# Patient Record
Sex: MALE | Race: WHITE | NOT HISPANIC OR LATINO | URBAN - METROPOLITAN AREA
[De-identification: names, ages, dates, MRNs, and addresses within clinical notes are randomized per-mention and may not be internally consistent; named-entity substitution may affect disease eponyms.]

---

## 2017-04-25 ENCOUNTER — APPOINTMENT (RX ONLY)
Dept: URBAN - METROPOLITAN AREA CLINIC 24 | Facility: CLINIC | Age: 67
Setting detail: DERMATOLOGY
End: 2017-04-25

## 2017-04-25 DIAGNOSIS — L81.4 OTHER MELANIN HYPERPIGMENTATION: ICD-10-CM

## 2017-04-25 DIAGNOSIS — L57.0 ACTINIC KERATOSIS: ICD-10-CM

## 2017-04-25 DIAGNOSIS — Z85.828 PERSONAL HISTORY OF OTHER MALIGNANT NEOPLASM OF SKIN: ICD-10-CM

## 2017-04-25 DIAGNOSIS — L85.3 XEROSIS CUTIS: ICD-10-CM

## 2017-04-25 DIAGNOSIS — D18.0 HEMANGIOMA: ICD-10-CM

## 2017-04-25 DIAGNOSIS — D22 MELANOCYTIC NEVI: ICD-10-CM

## 2017-04-25 DIAGNOSIS — L82.1 OTHER SEBORRHEIC KERATOSIS: ICD-10-CM

## 2017-04-25 PROBLEM — I10 ESSENTIAL (PRIMARY) HYPERTENSION: Status: ACTIVE | Noted: 2017-04-25

## 2017-04-25 PROBLEM — D23.61 OTHER BENIGN NEOPLASM OF SKIN OF RIGHT UPPER LIMB, INCLUDING SHOULDER: Status: ACTIVE | Noted: 2017-04-25

## 2017-04-25 PROBLEM — D22.5 MELANOCYTIC NEVI OF TRUNK: Status: ACTIVE | Noted: 2017-04-25

## 2017-04-25 PROBLEM — D18.01 HEMANGIOMA OF SKIN AND SUBCUTANEOUS TISSUE: Status: ACTIVE | Noted: 2017-04-25

## 2017-04-25 PROBLEM — L55.1 SUNBURN OF SECOND DEGREE: Status: ACTIVE | Noted: 2017-04-25

## 2017-04-25 PROCEDURE — 17003 DESTRUCT PREMALG LES 2-14: CPT

## 2017-04-25 PROCEDURE — 17000 DESTRUCT PREMALG LESION: CPT

## 2017-04-25 PROCEDURE — ? LIQUID NITROGEN

## 2017-04-25 PROCEDURE — ? OTHER

## 2017-04-25 PROCEDURE — ? COUNSELING

## 2017-04-25 PROCEDURE — 99214 OFFICE O/P EST MOD 30 MIN: CPT | Mod: 25

## 2017-04-25 ASSESSMENT — LOCATION DETAILED DESCRIPTION DERM
LOCATION DETAILED: RIGHT PROXIMAL DORSAL FOREARM
LOCATION DETAILED: LEFT POSTERIOR SHOULDER
LOCATION DETAILED: LEFT SUPERIOR LATERAL MALAR CHEEK
LOCATION DETAILED: LEFT SUPERIOR POSTERIOR HELIX
LOCATION DETAILED: LEFT SUPERIOR MEDIAL FOREHEAD
LOCATION DETAILED: LEFT RIB CAGE
LOCATION DETAILED: RIGHT MID TEMPLE
LOCATION DETAILED: LEFT DISTAL DORSAL FOREARM
LOCATION DETAILED: LEFT PROXIMAL DORSAL FOREARM
LOCATION DETAILED: RIGHT POSTERIOR SHOULDER
LOCATION DETAILED: LEFT MID-UPPER BACK
LOCATION DETAILED: LEFT SUPERIOR LATERAL MIDBACK
LOCATION DETAILED: RIGHT DISTAL DORSAL FOREARM

## 2017-04-25 ASSESSMENT — LOCATION ZONE DERM
LOCATION ZONE: ARM
LOCATION ZONE: FACE
LOCATION ZONE: EAR
LOCATION ZONE: TRUNK

## 2017-04-25 ASSESSMENT — LOCATION SIMPLE DESCRIPTION DERM
LOCATION SIMPLE: ABDOMEN
LOCATION SIMPLE: LEFT FOREHEAD
LOCATION SIMPLE: LEFT EAR
LOCATION SIMPLE: RIGHT SHOULDER
LOCATION SIMPLE: LEFT UPPER BACK
LOCATION SIMPLE: RIGHT TEMPLE
LOCATION SIMPLE: RIGHT FOREARM
LOCATION SIMPLE: LEFT SHOULDER
LOCATION SIMPLE: LEFT CHEEK
LOCATION SIMPLE: LEFT LOWER BACK
LOCATION SIMPLE: LEFT FOREARM

## 2017-04-25 NOTE — PROCEDURE: LIQUID NITROGEN
Duration Of Freeze Thaw-Cycle (Seconds): 4
Consent: The patient's consent was obtained including but not limited to risks of crusting, scabbing, blistering, scarring, darker or lighter pigmentary change, recurrence, incomplete removal and infection.
Number Of Freeze-Thaw Cycles: 1 freeze-thaw cycle
Post-Care Instructions: I reviewed with the patient in detail post-care instructions. Patient is to wear sunprotection, and avoid picking at any of the treated lesions. Pt may apply Vaseline to crusted or scabbing areas.
Detail Level: Simple
Render Post-Care Instructions In Note?: no

## 2017-04-25 NOTE — PROCEDURE: OTHER
Note Text (......Xxx Chief Complaint.): This diagnosis correlates with the
Other (Free Text): Encouraged pt to moisturize
Detail Level: Simple

## 2018-11-14 ENCOUNTER — APPOINTMENT (RX ONLY)
Dept: URBAN - METROPOLITAN AREA CLINIC 24 | Facility: CLINIC | Age: 68
Setting detail: DERMATOLOGY
End: 2018-11-14

## 2018-11-14 DIAGNOSIS — Z85.828 PERSONAL HISTORY OF OTHER MALIGNANT NEOPLASM OF SKIN: ICD-10-CM

## 2018-11-14 DIAGNOSIS — L57.0 ACTINIC KERATOSIS: ICD-10-CM

## 2018-11-14 DIAGNOSIS — Z71.89 OTHER SPECIFIED COUNSELING: ICD-10-CM

## 2018-11-14 DIAGNOSIS — L21.8 OTHER SEBORRHEIC DERMATITIS: ICD-10-CM | Status: WELL CONTROLLED

## 2018-11-14 DIAGNOSIS — L82.1 OTHER SEBORRHEIC KERATOSIS: ICD-10-CM

## 2018-11-14 DIAGNOSIS — L85.3 XEROSIS CUTIS: ICD-10-CM

## 2018-11-14 DIAGNOSIS — D18.0 HEMANGIOMA: ICD-10-CM

## 2018-11-14 DIAGNOSIS — L81.4 OTHER MELANIN HYPERPIGMENTATION: ICD-10-CM

## 2018-11-14 DIAGNOSIS — D22 MELANOCYTIC NEVI: ICD-10-CM

## 2018-11-14 PROBLEM — I10 ESSENTIAL (PRIMARY) HYPERTENSION: Status: ACTIVE | Noted: 2018-11-14

## 2018-11-14 PROBLEM — D18.01 HEMANGIOMA OF SKIN AND SUBCUTANEOUS TISSUE: Status: ACTIVE | Noted: 2018-11-14

## 2018-11-14 PROBLEM — L55.1 SUNBURN OF SECOND DEGREE: Status: ACTIVE | Noted: 2018-11-14

## 2018-11-14 PROBLEM — D22.5 MELANOCYTIC NEVI OF TRUNK: Status: ACTIVE | Noted: 2018-11-14

## 2018-11-14 PROCEDURE — 99214 OFFICE O/P EST MOD 30 MIN: CPT | Mod: 25

## 2018-11-14 PROCEDURE — 17003 DESTRUCT PREMALG LES 2-14: CPT

## 2018-11-14 PROCEDURE — ? OTHER

## 2018-11-14 PROCEDURE — ? LIQUID NITROGEN

## 2018-11-14 PROCEDURE — ? COUNSELING

## 2018-11-14 PROCEDURE — 17000 DESTRUCT PREMALG LESION: CPT

## 2018-11-14 ASSESSMENT — LOCATION DETAILED DESCRIPTION DERM
LOCATION DETAILED: RIGHT PROXIMAL POSTERIOR UPPER ARM
LOCATION DETAILED: LEFT SUPERIOR POSTERIOR HELIX
LOCATION DETAILED: RIGHT ANTERIOR PROXIMAL THIGH
LOCATION DETAILED: LEFT INFERIOR FOREHEAD
LOCATION DETAILED: RIGHT LATERAL ABDOMEN
LOCATION DETAILED: RIGHT SUPERIOR MEDIAL FOREHEAD
LOCATION DETAILED: LEFT POSTERIOR SHOULDER
LOCATION DETAILED: LEFT FOREHEAD
LOCATION DETAILED: LEFT SUPERIOR FOREHEAD
LOCATION DETAILED: LEFT SUPERIOR LATERAL MIDBACK
LOCATION DETAILED: RIGHT INFERIOR LATERAL MIDBACK
LOCATION DETAILED: LEFT SUPERIOR LATERAL MALAR CHEEK
LOCATION DETAILED: LEFT RIB CAGE
LOCATION DETAILED: RIGHT PROXIMAL CALF
LOCATION DETAILED: NASAL SUPRATIP
LOCATION DETAILED: RIGHT POSTERIOR SHOULDER
LOCATION DETAILED: LEFT MID-UPPER BACK

## 2018-11-14 ASSESSMENT — LOCATION ZONE DERM
LOCATION ZONE: ARM
LOCATION ZONE: FACE
LOCATION ZONE: NOSE
LOCATION ZONE: EAR
LOCATION ZONE: LEG
LOCATION ZONE: TRUNK

## 2018-11-14 ASSESSMENT — LOCATION SIMPLE DESCRIPTION DERM
LOCATION SIMPLE: ABDOMEN
LOCATION SIMPLE: RIGHT FOREHEAD
LOCATION SIMPLE: LEFT FOREHEAD
LOCATION SIMPLE: NOSE
LOCATION SIMPLE: LEFT CHEEK
LOCATION SIMPLE: LEFT SHOULDER
LOCATION SIMPLE: LEFT UPPER BACK
LOCATION SIMPLE: RIGHT SHOULDER
LOCATION SIMPLE: LEFT EAR
LOCATION SIMPLE: RIGHT UPPER ARM
LOCATION SIMPLE: RIGHT LOWER BACK
LOCATION SIMPLE: LEFT LOWER BACK
LOCATION SIMPLE: RIGHT CALF
LOCATION SIMPLE: RIGHT THIGH

## 2018-11-14 NOTE — PROCEDURE: OTHER
Note Text (......Xxx Chief Complaint.): This diagnosis correlates with the
Other (Free Text): Short cool showers, moisturizer
Detail Level: Simple

## 2018-11-14 NOTE — PROCEDURE: LIQUID NITROGEN
Post-Care Instructions: I reviewed with the patient in detail post-care instructions. Patient is to wear sunprotection, and avoid picking at any of the treated lesions. Pt may apply Vaseline to crusted or scabbing areas.
Number Of Freeze-Thaw Cycles: 1 freeze-thaw cycle
Duration Of Freeze Thaw-Cycle (Seconds): 5
Detail Level: Simple
Render Post-Care Instructions In Note?: no
Consent: The patient's consent was obtained including but not limited to risks of crusting, scabbing, blistering, scarring, darker or lighter pigmentary change, recurrence, incomplete removal and infection.

## 2022-02-09 ENCOUNTER — HOSPITAL ENCOUNTER (OUTPATIENT)
Dept: WOUND CARE | Age: 72
Discharge: HOME | End: 2022-02-09
Attending: PODIATRIST
Payer: COMMERCIAL

## 2022-02-09 VITALS — DIASTOLIC BLOOD PRESSURE: 117 MMHG | HEART RATE: 94 BPM | SYSTOLIC BLOOD PRESSURE: 199 MMHG | TEMPERATURE: 97.9 F

## 2022-02-09 PROBLEM — L89.893 PRESSURE INJURY OF LEFT FOOT, STAGE 3 (HCC): Status: ACTIVE | Noted: 2022-02-09

## 2022-02-09 PROBLEM — M76.822 POSTERIOR TIBIAL TENDON DYSFUNCTION (PTTD) OF LEFT LOWER EXTREMITY: Status: ACTIVE | Noted: 2022-02-09

## 2022-02-09 PROCEDURE — 97597 DBRDMT OPN WND 1ST 20 CM/<: CPT

## 2022-02-09 RX ORDER — LIDOCAINE HYDROCHLORIDE 40 MG/ML
SOLUTION TOPICAL ONCE
Status: CANCELLED | OUTPATIENT
Start: 2022-02-09 | End: 2022-02-09

## 2022-02-09 RX ORDER — BETAMETHASONE DIPROPIONATE 0.5 MG/G
OINTMENT TOPICAL ONCE
Status: CANCELLED | OUTPATIENT
Start: 2022-02-09 | End: 2022-02-09

## 2022-02-09 RX ORDER — LIDOCAINE 40 MG/G
CREAM TOPICAL ONCE
Status: CANCELLED | OUTPATIENT
Start: 2022-02-09 | End: 2022-02-09

## 2022-02-09 RX ORDER — CLOBETASOL PROPIONATE 0.5 MG/G
OINTMENT TOPICAL ONCE
Status: CANCELLED | OUTPATIENT
Start: 2022-02-09 | End: 2022-02-09

## 2022-02-09 RX ORDER — SILVER SULFADIAZINE 10 G/1000G
CREAM TOPICAL ONCE
Status: CANCELLED | OUTPATIENT
Start: 2022-02-09 | End: 2022-02-09

## 2022-02-09 RX ORDER — LIDOCAINE HYDROCHLORIDE 20 MG/ML
JELLY TOPICAL ONCE
Status: CANCELLED | OUTPATIENT
Start: 2022-02-09 | End: 2022-02-09

## 2022-02-09 RX ORDER — GENTAMICIN SULFATE 1 MG/G
OINTMENT TOPICAL ONCE
Status: CANCELLED | OUTPATIENT
Start: 2022-02-09 | End: 2022-02-09

## 2022-02-09 RX ORDER — BACITRACIN ZINC AND POLYMYXIN B SULFATE 500; 1000 [USP'U]/G; [USP'U]/G
OINTMENT TOPICAL ONCE
Status: CANCELLED | OUTPATIENT
Start: 2022-02-09 | End: 2022-02-09

## 2022-02-09 RX ORDER — TRIAMCINOLONE ACETONIDE 1 MG/G
OINTMENT TOPICAL ONCE
Status: CANCELLED | OUTPATIENT
Start: 2022-02-09 | End: 2022-02-09

## 2022-02-09 RX ORDER — BACITRACIN 500 [USP'U]/G
OINTMENT TOPICAL ONCE
Status: CANCELLED | OUTPATIENT
Start: 2022-02-09 | End: 2022-02-09

## 2022-02-09 RX ORDER — MUPIROCIN 20 MG/G
OINTMENT TOPICAL ONCE
Status: CANCELLED | OUTPATIENT
Start: 2022-02-09 | End: 2022-02-09

## 2022-02-09 RX ORDER — LIDOCAINE 50 MG/G
OINTMENT TOPICAL ONCE
Status: CANCELLED | OUTPATIENT
Start: 2022-02-09 | End: 2022-02-09

## 2022-02-09 NOTE — WOUND CARE
02/09/22 1051   Wound Foot Left;Plantar   Date First Assessed/Time First Assessed: 02/09/22 1051   Present on Hospital Admission: Yes  Wound Approximate Age at First Assessment (Weeks): 7 weeks  Location: Foot  Wound Location Orientation: Left;Plantar   Wound Image    Wound Etiology Other (Comment)  (neuropathic)   Dressing Status Intact   Cleansed Cleansed with saline   Dressing/Treatment Band-Aid/Adhesive bandage   Wound Length (cm) 0.5 cm   Wound Width (cm) 0.4 cm   Wound Depth (cm) 0.5 cm   Wound Surface Area (cm^2) 0.2 cm^2   Wound Volume (cm^3) 0.1 cm^3   Wound Assessment Pink/red   Drainage Amount Small   Drainage Description Serosanguinous   Wound Odor None   Luna-Wound/Incision Assessment Hyperkeratosis (Callous)   Wound Thickness Description Full thickness

## 2022-02-09 NOTE — PROGRESS NOTES
[address] [place] [address] Drive [address], [address]      [address] RECORD NUMBER:  [id number]  AGE: 80 y.o. RACE WHITE/NON-  GENDER: male  : 1950  EPISODE DATE:  2022    Subjective:     Chief Complaint   Patient presents with    Wound Check     left plantar foot 22        Marck Arana is a 80 y.o. WHITE/NON- male who presents with a wound to the left lower extremity at the plantar 3rd metatarsal head. This has been present for 2-3 months. He notes a long history of flat foot and has special orthopedic shoes with added roll bars and memory foam insoles. He recently increased his walking which created a callus that subsequently broke down. He was seen by his PCP on 2/3/22 who prescribed oral keflex which he continues on today. He has a trip to [facility] planned for next week but is considering not going due to the foot. It is unclear what he is placing on the wound itself for wound care. He denies drainage. He denies pain.        HISTORY of PRESENT ILLNESS HPI    Nature: Painless  Location: left forefoot  Duration: 2021  Onset: Patient states it started as callusing  Course: stable  Aggravating/Alleviating: Worsened with pressure   Treatment: none    Ulcer Identification:  Ulcer Type: pressure    Contributing Factors: chronic pressure    Wound: stage 3 pressure         PAST MEDICAL HISTORY    Past Medical History:   Diagnosis Date    Basal cell carcinoma 2014    Erectile dysfunction     Hypertension     Posterior tibial tendon dysfunction (PTTD) of left lower extremity 2022    Pressure injury of left foot, stage 3 ([address] 2022        PAST SURGICAL HISTORY    Past Surgical History:   Procedure Laterality Date    HX HEENT      tonsillectomy    HX MALIGNANT SKIN LESION EXCISION  2014    left ear lesion; Basal cell carcinoma       FAMILY HISTORY    Family History   Problem Relation Age of Onset    Cancer Mother 62        breast cancer    Cancer Father 80        colon cancer       SOCIAL HISTORY    Social History     Tobacco Use    Smoking status: Never Smoker    Smokeless tobacco: Never Used   Substance Use Topics    Alcohol use: No    Drug use: Never       ALLERGIES    No Known Allergies    MEDICATIONS    Current Outpatient Medications on File Prior to Encounter   Medication Sig Dispense Refill    latanoprost (XALATAN) 0.005 % ophthalmic solution INSTILL 1 DROP INTO BOTH EYES EVERY NIGHT      amLODIPine-benazepril (LOTREL) 5-20 mg per capsule Take 1 Capsule by mouth daily. 90 Capsule 3    cephALEXin (KEFLEX) 500 mg capsule Take 1 Capsule by mouth three (3) times daily. 30 Capsule 0    sildenafil citrate (VIAGRA) 100 mg tablet Take 1 Tab by mouth as needed. 5 Tab 5     No current facility-administered medications on file prior to encounter. REVIEW OF SYSTEMS    Pertinent items are noted in HPI. Objective:     Visit Vitals  BP (!) 199/117 (BP 1 Location: Left upper arm, BP Patient Position: At rest)   Pulse 94   Temp 97.9 °F (36.6 °C)       Wt Readings from Last 3 Encounters:   02/03/22 91.2 kg (201 lb)   11/05/20 92.4 kg (203 lb 9.6 oz)   09/10/20 94.4 kg (208 lb 3.2 oz)       PHYSICAL EXAM    General: well developed, well nourished, pleasant , NAD. Hygiene good  Psych: cooperative. Pleasant. No anxiety or depression. Normal mood and affect. HEENT: Normocephalic, atraumatic. EOMI. Conjunctiva clear. No scleral icterus. Neck: Normal range of motion. No masses. Chest: Good air entry bilaterally. Respirations nonlabored  Cardio: Normal heart sounds,no rubs, murmurs or gallops  Abdomen: Soft, nontender, nondistended, normoactive bowel sounds    Vascular: DP and PT pulses are palpable at 2/4 bilateral. Skin temperature is uniform from proximal to distal bilateral. Hair growth is absent bilateral. No erythema, edema, heat is appreciated bilateral. No evidence of cellulitis.    Derm: Nails 1-5 bilateral are thickened, discolored and with subungual debris. No paronychial infections are noted. Skin is atrophic and xerotic. No subcutaneous masses or hyperpigmented lesions are present. Neuro: Epicritic sensation is present bilateral. Protective sensation is pesent with 5.07 SWMF testing to all 10 sites bilateral. Muscle tone and bulk is symmetric bilateral.      Full thickness ulceration is noted to the sub 3rd metatarsal head left foot. Thick hyperkeratotic rim with granular base. No erythema, edema, purulence or odor. Soft end feel with no bone exposed or palpable. No undermining or sinus track is noted. No fluctuance with palpation. Left foot with semi reducible posterior tibial tendon dysfunction with loss of medial arch, valgus heel and abducted forefoot. DATA REVIEW:  No results found for this or any previous visit (from the past 336 hour(s)).       Debridement Wound Care      Wound Foot Left;Plantar (Active)   Wound Image    02/09/22 1051   Wound Etiology Other (Comment) 02/09/22 1051   Dressing Status Intact 02/09/22 1051   Cleansed Cleansed with saline 02/09/22 1051   Dressing/Treatment Band-Aid/Adhesive bandage 02/09/22 1051   Wound Length (cm) 0.5 cm 02/09/22 1051   Wound Width (cm) 0.4 cm 02/09/22 1051   Wound Depth (cm) 0.5 cm 02/09/22 1051   Wound Surface Area (cm^2) 0.2 cm^2 02/09/22 1051   Wound Volume (cm^3) 0.1 cm^3 02/09/22 1051   Post-Procedure Length (cm) 1 cm 02/09/22 1051   Post-Procedure Width (cm) 1 cm 02/09/22 1051   Post-Procedure Depth (cm) 0.2 cm 02/09/22 1051   Post-Procedure Surface Area (cm^2) 1 cm^2 02/09/22 1051   Post-Procedure Volume (cm^3) 0.2 cm^3 02/09/22 1051   Wound Assessment Pink/red 02/09/22 1051   Drainage Amount Small 02/09/22 1051   Drainage Description Serosanguinous 02/09/22 1051   Wound Odor None 02/09/22 1051   Luna-Wound/Incision Assessment Hyperkeratosis (Callous) 02/09/22 1051   Wound Thickness Description Full thickness 02/09/22 1051   Number of days: 0        Procedure Note  Indications:  Based on my examination of this patient's wound(s)/ulcer(s) today, debridement is required to promote healing and evaluate the wound base. Performed by: Greyson Haile DPM    Consent obtained: Yes    Time out taken: Yes    Debridement: Non-excisional/Selective    Using forceps and # 15 blade scalpel the wound(s)/ulcer(s) was/were sharply debrided down through and including the removal of    subcutaneous tissue    Devitalized Tissue Debrided: slough and callus    Pre Debridement Measurements:  Are located in the Wound/Ulcer Documentation Flow Sheet    Wound/Ulcer #: left foot    Post Debridement Measurements:  Wound/Ulcer Descriptions are Pre Debridement except measurements:Pressure ulcer, Stage 3    Percent of Wound(s)/Ulcer(s) Debrided: 100%    Total Surface Area Debrided:  1 sq cm     Estimated Blood Loss:  None    Hemostasis Achieved: Pressure    Procedural Pain: 0 / 10     Post Procedural Pain: 0 / 10     Response to treatment: Well tolerated by patient     Assessment and Plan:     Problem List Items Addressed This Visit     Pressure ulcer stage 3, left foot  PTTD left foot        Patient examined and evaluated. Educated patient and barriers to healing. Pressure reduction is paramount due to location of the wound. Patient was against the idea of any offloading devices. Explained at length that if the pressure is not removed from the wound area, the wound will not heal. He was given the options of a darco wedge shoe or a flat shoe with a peg insole. By the end of the conversation he was more amenable to this idea. Infection control is required to prevent loss of limb/life - maintain dressing coverage at all times, do not soak or get wet in the shower, wash hands before and after dressing changes. Monitor for erythema, edema, odor, and thick yellow drainage and contact the center immediately if noted.     Proper nutrition to support skin growth includes increased protein, vitamins and amino acids - animal based foods and Francisco recommended. Moisture management to prevent maceration and dryness - keep wound covered at all times outside dressing care, do not \"air out\" or soak. Dressing will consist of acticoat three times a week. He was advised that if he goes to [facility] he is not to get sand or public water (ocean, pool, hot tub etc...) in the wound. Recommend he rents a golf cart where he is going to get around versus all those stpes to the foot. He will return here in 3 weeks due to his trip. Patient instructed on the following: Follow all wound dressing instructions. Do not get dressing or wound wet. May shower if wound can be effectively kept dry. Cast covers may be purchased at [facility] [employer] [facility]. Should you experience increased redness, swelling, pain, foul odor, size of wound(s), or have a temperature over 101 degrees please contact the [address] [place] [address] Road at [phone] or if after hours contact your primary care physician or go to the hospital emergency department. Orders Placed This Encounter    WOUND CARE, DRESSING CHANGE     Left plantar foot:  Cleanse with normal saline   Acticoat Flex 3: cut top approximate size of wound, apply to wound bed. Cover with bandaid or coversite  Change dressing 3 times per week      Limit walking as much as possible  Obtain PEG insole for offloading bring with you at next appointment  DO NOT GET WET in shower  May purchase cast cover from [employer] or [employer] for showering     Standing Status:   Standing     Number of Occurrences:   1       Follow-up Information     Follow up With Specialties Details Why Contact Info    Alberto [place] In 2 weeks For wound re-check William Hutchinson [address] [facility] [id number] [phone]           Treatment Note please see attached Discharge Instructions    Written patient dismissal instructions given to patient or POA. Electronically signed by King Moises DPM on 2/9/2022 at 11:36 AM

## 2022-02-09 NOTE — WOUND CARE
02/09/22 1051   Wound Foot Left;Plantar   Date First Assessed/Time First Assessed: 02/09/22 1051   Present on Hospital Admission: Yes  Wound Approximate Age at First Assessment (Weeks): 7 weeks  Location: Foot  Wound Location Orientation: Left;Plantar   Wound Image     Wound Etiology Other (Comment)  (neuropathic)   Dressing Status Intact   Cleansed Cleansed with saline   Dressing/Treatment Band-Aid/Adhesive bandage   Wound Length (cm) 0.5 cm   Wound Width (cm) 0.4 cm   Wound Depth (cm) 0.5 cm   Wound Surface Area (cm^2) 0.2 cm^2   Wound Volume (cm^3) 0.1 cm^3   Post-Procedure Length (cm) 1 cm   Post-Procedure Width (cm) 1 cm   Post-Procedure Depth (cm) 0.2 cm   Post-Procedure Surface Area (cm^2) 1 cm^2   Post-Procedure Volume (cm^3) 0.2 cm^3   Wound Assessment Pink/red   Drainage Amount Small   Drainage Description Serosanguinous   Wound Odor None   Luna-Wound/Incision Assessment Hyperkeratosis (Callous)   Wound Thickness Description Full thickness     Post debridement

## 2022-02-09 NOTE — DISCHARGE INSTRUCTIONS
[address]  [address], [address]  Phone: [phone]  Fax: [phone]    Patient: Reche Blizzard MRN: [id number]  SSN: [id number]    YOB: 1950  Age: 80 y.o. Sex: male       Return Appointment: 2 weeks with Elvia Marr DPM    Instructions: Left plantar foot:  Cleanse with normal saline   Acticoat Flex 3: cut top approximate size of wound, apply to wound bed. Cover with bandaid or coversite  Change dressing 3 times per week      Limit walking as much as possible  Obtain PEG insole for offloading bring with you at next appointment  DO NOT GET WET in shower  May purchase cast cover from [employer] or [employer] for showering    Should you experience increased redness, swelling, pain, foul odor, size of wound(s), or have a temperature over 101 degrees please contact the [address] [place] [address] Road at [phone] or if after hours contact your primary care physician or go to the hospital emergency department.     Signed By: Dennise Perez RN     February 9, 2022

## 2022-02-09 NOTE — WOUND CARE
[address]  [address], [address]  Phone: [phone]  Fax: [phone]    Patient: Trenton Perez MRN: [id number]  SSN: [id number]    YOB: 1950  Age: 80 y.o. Sex: male       Return Appointment: 2 weeks with Maggy Deleon DPM    Instructions: Left plantar foot:  Cleanse with normal saline   Acticoat Flex 3: cut top approximate size of wound, apply to wound bed. Cover with bandaid or coversite  Change dressing 3 times per week      Limit walking as much as possible  Obtain PEG insole for offloading bring with you at next appointment  DO NOT GET WET in shower  May purchase cast cover from [employer] or [employer] for showering    Should you experience increased redness, swelling, pain, foul odor, size of wound(s), or have a temperature over 101 degrees please contact the [address] [place] [address] Road at [phone] or if after hours contact your primary care physician or go to the hospital emergency department.     Signed By: Serena Lloyd RN     February 9, 2022

## 2022-02-18 ENCOUNTER — NURSE TRIAGE (OUTPATIENT)
Dept: OTHER | Facility: CLINIC | Age: 72
End: 2022-02-18

## 2022-02-18 NOTE — TELEPHONE ENCOUNTER
Received call from Albin Rios at [facility] with Departing. Subjective: Caller states \"Unbalance\"     Current Symptoms: Feels unbalance in the morning, as the days goes it gets better x 1 week. Neck and back pain. Onset: 1 week ago; intermittent    Associated Symptoms: NA    Pain Severity: 3/10; aching; intermittent    Temperature: denies fever    What has been tried: Advil and heating pad    LMP: NA Pregnant: NA    Recommended disposition: Office today. [facility] as backup plan    Care advice provided, patient verbalizes understanding; denies any other questions or concerns; instructed to call back for any new or worsening symptoms. Patient/Caller agrees with recommended disposition; writer provided warm transfer to [facility] at [facility] for appointment scheduling    Attention Provider: Thank you for allowing me to participate in the care of your patient. The patient was connected to triage in response to information provided to the [facility]. Please do not respond through this encounter as the response is not directed to a shared pool.     Reason for Disposition   MODERATE dizziness (e.g., interferes with normal activities) (Exception: dizziness caused by heat exposure, sudden standing, or poor fluid intake)    Protocols used: DIZZINESS-ADULT-OH

## 2022-02-21 PROBLEM — E11.9 TYPE 2 DIABETES MELLITUS (HCC): Status: ACTIVE | Noted: 2022-02-21

## 2022-02-23 ENCOUNTER — HOSPITAL ENCOUNTER (OUTPATIENT)
Dept: WOUND CARE | Age: 72
Discharge: HOME | End: 2022-02-23
Attending: PODIATRIST
Payer: COMMERCIAL

## 2022-02-23 VITALS
OXYGEN SATURATION: 99 % | HEIGHT: 68 IN | WEIGHT: 198 LBS | BODY MASS INDEX: 30.01 KG/M2 | TEMPERATURE: 97.6 F | SYSTOLIC BLOOD PRESSURE: 191 MMHG | DIASTOLIC BLOOD PRESSURE: 99 MMHG | HEART RATE: 85 BPM | RESPIRATION RATE: 16 BRPM

## 2022-02-23 DIAGNOSIS — L89.893 PRESSURE INJURY OF LEFT FOOT, STAGE 3 (HCC): Primary | ICD-10-CM

## 2022-02-23 DIAGNOSIS — M76.822 POSTERIOR TIBIAL TENDON DYSFUNCTION (PTTD) OF LEFT LOWER EXTREMITY: ICD-10-CM

## 2022-02-23 PROCEDURE — 97597 DBRDMT OPN WND 1ST 20 CM/<: CPT

## 2022-02-23 RX ORDER — CLOBETASOL PROPIONATE 0.5 MG/G
OINTMENT TOPICAL ONCE
Status: CANCELLED | OUTPATIENT
Start: 2022-02-23 | End: 2022-02-23

## 2022-02-23 RX ORDER — LIDOCAINE HYDROCHLORIDE 40 MG/ML
SOLUTION TOPICAL ONCE
Status: CANCELLED | OUTPATIENT
Start: 2022-02-23 | End: 2022-02-23

## 2022-02-23 RX ORDER — TRIAMCINOLONE ACETONIDE 1 MG/G
OINTMENT TOPICAL ONCE
Status: CANCELLED | OUTPATIENT
Start: 2022-02-23 | End: 2022-02-23

## 2022-02-23 RX ORDER — LIDOCAINE HYDROCHLORIDE 20 MG/ML
JELLY TOPICAL ONCE
Status: CANCELLED | OUTPATIENT
Start: 2022-02-23 | End: 2022-02-23

## 2022-02-23 RX ORDER — GENTAMICIN SULFATE 1 MG/G
OINTMENT TOPICAL ONCE
Status: CANCELLED | OUTPATIENT
Start: 2022-02-23 | End: 2022-02-23

## 2022-02-23 RX ORDER — LIDOCAINE 50 MG/G
OINTMENT TOPICAL ONCE
Status: CANCELLED | OUTPATIENT
Start: 2022-02-23 | End: 2022-02-23

## 2022-02-23 RX ORDER — BACITRACIN 500 [USP'U]/G
OINTMENT TOPICAL ONCE
Status: CANCELLED | OUTPATIENT
Start: 2022-02-23 | End: 2022-02-23

## 2022-02-23 RX ORDER — BETAMETHASONE DIPROPIONATE 0.5 MG/G
OINTMENT TOPICAL ONCE
Status: CANCELLED | OUTPATIENT
Start: 2022-02-23 | End: 2022-02-23

## 2022-02-23 RX ORDER — MUPIROCIN 20 MG/G
OINTMENT TOPICAL ONCE
Status: CANCELLED | OUTPATIENT
Start: 2022-02-23 | End: 2022-02-23

## 2022-02-23 RX ORDER — BACITRACIN ZINC AND POLYMYXIN B SULFATE 500; 1000 [USP'U]/G; [USP'U]/G
OINTMENT TOPICAL ONCE
Status: CANCELLED | OUTPATIENT
Start: 2022-02-23 | End: 2022-02-23

## 2022-02-23 RX ORDER — SILVER SULFADIAZINE 10 G/1000G
CREAM TOPICAL ONCE
Status: CANCELLED | OUTPATIENT
Start: 2022-02-23 | End: 2022-02-23

## 2022-02-23 RX ORDER — LIDOCAINE 40 MG/G
CREAM TOPICAL ONCE
Status: CANCELLED | OUTPATIENT
Start: 2022-02-23 | End: 2022-02-23

## 2022-02-23 NOTE — PROGRESS NOTES
[address], [address]      [address] RECORD NUMBER:  [id number]  AGE: 80 y.o. RACE WHITE/NON-  GENDER: male  : 1950  EPISODE DATE:  2022    Subjective:     Chief Complaint   Patient presents with    Follow-up     left foot        Beatrice Quintana is a 80 y.o. WHITE/NON- male who presents with a wound to the left lower extremity at the plantar 3rd metatarsal head. He did order the peg insole but then fashioned his own that fit better in his shoe with a cut out in the new insole he layer for this purpose.         HISTORY of PRESENT ILLNESS HPI    Nature: Painless  Location: left forefoot  Duration: 2021  Onset: Patient states it started as callusing  Course: improving  Aggravating/Alleviating: Worsened with pressure   Treatment: acticoat    Ulcer Identification:  Ulcer Type: pressure    Contributing Factors: chronic pressure    Wound: stage 3 pressure         PAST MEDICAL HISTORY    Past Medical History:   Diagnosis Date    Basal cell carcinoma 2014    Erectile dysfunction     Hypertension     Posterior tibial tendon dysfunction (PTTD) of left lower extremity 2022    Pressure injury of left foot, stage 3 ([address] 2022        PAST SURGICAL HISTORY    Past Surgical History:   Procedure Laterality Date    HX HEENT      tonsillectomy    HX MALIGNANT SKIN LESION EXCISION  2014    left ear lesion; Basal cell carcinoma       FAMILY HISTORY    Family History   Problem Relation Age of Onset    Cancer Mother 62        breast cancer    Cancer Father 80        colon cancer       SOCIAL HISTORY    Social History     Tobacco Use    Smoking status: Never Smoker    Smokeless tobacco: Never Used   Substance Use Topics    Alcohol use: No    Drug use: Never       ALLERGIES    No Known Allergies    MEDICATIONS    Current Outpatient Medications on File Prior to Encounter   Medication Sig Dispense Refill    metFORMIN (GLUCOPHAGE) 500 mg tablet Take 1 Tablet by mouth two (2) times daily (with meals) for 30 days. 60 Tablet 5    latanoprost (XALATAN) 0.005 % ophthalmic solution INSTILL 1 DROP INTO BOTH EYES EVERY NIGHT      amLODIPine-benazepril (LOTREL) 5-20 mg per capsule Take 1 Capsule by mouth daily. 90 Capsule 3    sildenafil citrate (VIAGRA) 100 mg tablet Take 1 Tab by mouth as needed. 5 Tab 5     No current facility-administered medications on file prior to encounter. REVIEW OF SYSTEMS    Pertinent items are noted in HPI. Objective:     Visit Vitals  BP (!) 191/99 (BP 1 Location: Left upper arm) Comment: is being monitored by family physician   Pulse 85   Temp 97.6 °F (36.4 °C)   Resp 16   Ht 5' 8\" (1.727 m)   Wt 89.8 kg (198 lb)   SpO2 99%   BMI 30.11 kg/m²       Wt Readings from Last 3 Encounters:   02/23/22 89.8 kg (198 lb)   02/21/22 89.4 kg (197 lb)   02/03/22 91.2 kg (201 lb)       PHYSICAL EXAM    General: well developed, well nourished, pleasant , NAD. Hygiene good  Psych: cooperative. Pleasant. No anxiety or depression. Normal mood and affect. HEENT: Normocephalic, atraumatic. EOMI. Conjunctiva clear. No scleral icterus. Neck: Normal range of motion. No masses. Chest: Good air entry bilaterally. Respirations nonlabored  Cardio: Normal heart sounds,no rubs, murmurs or gallops  Abdomen: Soft, nontender, nondistended, normoactive bowel sounds    Vascular: DP and PT pulses are palpable at 2/4 bilateral. Skin temperature is uniform from proximal to distal bilateral. Hair growth is absent bilateral. No erythema, edema, heat is appreciated bilateral. No evidence of cellulitis. Derm: Nails 1-5 bilateral are thickened, discolored and with subungual debris. No paronychial infections are noted. Skin is atrophic and xerotic. No subcutaneous masses or hyperpigmented lesions are present.    Neuro: Epicritic sensation is present bilateral. Protective sensation is pesent with 5.07 SWMF testing to all 10 sites bilateral. Muscle tone and bulk is symmetric bilateral.    Full thickness ulceration is noted to the sub 3rd metatarsal head left foot. Thiner hyperkeratotic rim with granular base. No erythema, edema, purulence or odor. Soft end feel with no bone exposed or palpable. No undermining or sinus track is noted. No fluctuance with palpation. Left foot with semi reducible posterior tibial tendon dysfunction with loss of medial arch, valgus heel and abducted forefoot.      Wound Foot Left;Plantar (Active)   Wound Image    02/23/22 1130   Wound Etiology Other (Comment) 02/23/22 1130   Dressing Status Intact 02/23/22 1130   Cleansed Cleansed with saline 02/23/22 1130   Dressing/Treatment Band-Aid/Adhesive bandage 02/09/22 1051   Offloading for Diabetic Foot Ulcers Ortho wedge/inserts 02/23/22 1130   Wound Length (cm) 0.2 cm 02/23/22 1130   Wound Width (cm) 0.2 cm 02/23/22 1130   Wound Depth (cm) 0.3 cm 02/23/22 1130   Wound Surface Area (cm^2) 0.04 cm^2 02/23/22 1130   Change in Wound Size % 80 02/23/22 1130   Wound Volume (cm^3) 0.012 cm^3 02/23/22 1130   Wound Healing % 88 02/23/22 1130   Post-Procedure Length (cm) 0.3 cm 02/23/22 1130   Post-Procedure Width (cm) 0.3 cm 02/23/22 1130   Post-Procedure Depth (cm) 0.3 cm 02/23/22 1130   Post-Procedure Surface Area (cm^2) 0.09 cm^2 02/23/22 1130   Post-Procedure Volume (cm^3) 0.027 cm^3 02/23/22 1130   Wound Assessment Pink/red 02/23/22 1130   Drainage Amount Small 02/23/22 1130   Drainage Description Serosanguinous 02/23/22 1130   Wound Odor None 02/23/22 1130   Luna-Wound/Incision Assessment Hyperkeratosis (Callous) 02/23/22 1130   Wound Thickness Description Full thickness 02/23/22 1130   Number of days: 14          DATA REVIEW:  Recent Results (from the past 336 hour(s))   AMB POC URINALYSIS DIP STICK MANUAL W/O MICRO    Collection Time: 02/21/22  2:33 PM   Result Value Ref Range    Color (UA POC) Yellow     Clarity (UA POC) Clear     Glucose (UA POC) Trace Negative    Bilirubin (UA POC) Negative Negative    Ketones (UA POC) Negative Negative    Specific gravity (UA POC) 1.030 1.001 - 1.035    Blood (UA POC) Negative Negative    pH (UA POC) 6.0 4.6 - 8.0    Protein (UA POC) Trace Negative    Urobilinogen (UA POC) 2 mg/dL 0.2 - 1    Nitrites (UA POC) Negative Negative    Leukocyte esterase (UA POC) Negative Negative   AMB POC HEMOGLOBIN A1C    Collection Time: 02/21/22  2:33 PM   Result Value Ref Range    Hemoglobin A1c (POC) 8.8 %         Debridement Wound Care      Wound Foot Left;Plantar (Active)   Wound Image    02/09/22 1051   Wound Etiology Other (Comment) 02/09/22 1051   Dressing Status Intact 02/09/22 1051   Cleansed Cleansed with saline 02/09/22 1051   Dressing/Treatment Band-Aid/Adhesive bandage 02/09/22 1051   Wound Length (cm) 0.5 cm 02/09/22 1051   Wound Width (cm) 0.4 cm 02/09/22 1051   Wound Depth (cm) 0.5 cm 02/09/22 1051   Wound Surface Area (cm^2) 0.2 cm^2 02/09/22 1051   Wound Volume (cm^3) 0.1 cm^3 02/09/22 1051   Post-Procedure Length (cm) 1 cm 02/09/22 1051   Post-Procedure Width (cm) 1 cm 02/09/22 1051   Post-Procedure Depth (cm) 0.2 cm 02/09/22 1051   Post-Procedure Surface Area (cm^2) 1 cm^2 02/09/22 1051   Post-Procedure Volume (cm^3) 0.2 cm^3 02/09/22 1051   Wound Assessment Pink/red 02/09/22 1051   Drainage Amount Small 02/09/22 1051   Drainage Description Serosanguinous 02/09/22 1051   Wound Odor None 02/09/22 1051   Luna-Wound/Incision Assessment Hyperkeratosis (Callous) 02/09/22 1051   Wound Thickness Description Full thickness 02/09/22 1051   Number of days: 0        Procedure Note  Indications:  Based on my examination of this patient's wound(s)/ulcer(s) today, debridement is required to promote healing and evaluate the wound base.     Performed by: Arline Ingram DPM    Consent obtained: Yes    Time out taken: Yes    Debridement: Non-excisional/Selective    Using # 15 blade scalpel the wound(s)/ulcer(s) was/were sharply debrided down through and including the removal of    dermis    Devitalized Tissue Debrided: slough and callus    Pre Debridement Measurements:  Are located in the Wound/Ulcer Documentation Flow Sheet    Wound/Ulcer #: left foot    Post Debridement Measurements:  Wound/Ulcer Descriptions are Pre Debridement except measurements:Pressure ulcer, Stage 3    Percent of Wound(s)/Ulcer(s) Debrided: 100%    Total Surface Area Debrided:  1 sq cm     Estimated Blood Loss:  None    Hemostasis Achieved: Pressure    Procedural Pain: 0 / 10     Post Procedural Pain: 0 / 10     Response to treatment: Well tolerated by patient     Assessment and Plan:     Problem List Items Addressed This Visit     Pressure ulcer stage 3, left foot  PTTD left foot        Patient examined and evaluated. Educated patient and barriers to healing. Pressure reduction is paramount due to location of the wound. His current offloading insole that he created has made a big impact - continue to wear this at all times, inside and outside the home. Infection control is required to prevent loss of limb/life - maintain dressing coverage at all times, do not soak or get wet in the shower, wash hands before and after dressing changes. Monitor for erythema, edema, odor, and thick yellow drainage and contact the center immediately if noted. Proper nutrition to support skin growth includes increased protein, vitamins and amino acids - animal based foods and Francisco recommended. Moisture management to prevent maceration and dryness - keep wound covered at all times outside dressing care, do not \"air out\" or soak. Dressing will consist of collagen with silver three times a week to aide in healing the base to prevent closure over a pocket. Return in 2 weeks. Patient instructed on the following: Follow all wound dressing instructions. Do not get dressing or wound wet. May shower if wound can be effectively kept dry.   Cast covers may be purchased at local pharmacies. Should you experience increased redness, swelling, pain, foul odor, size of wound(s), or have a temperature over 101 degrees please contact the [address] [place] [address] Road at [phone] or if after hours contact your primary care physician or go to the hospital emergency department. Orders Placed This Encounter    INITIATE OUTPATIENT WOUND CARE PROTOCOL     Cleanse wound with saline. If wound contains bioburden or contamination, cleanse with wound cleanser or antimicrobial solution. For normal periwound tissue without irritation nor maceration, apply topical skin protectant. For periwound tissue with irritation and/or maceration, apply zinc based product, topical steroid cream/ointment, or equivalent. For wounds with dry firm black eschar and/or without exudate, apply betadine and leave open to air. For wounds with scant/small to no exudate or drainage, apply wound gel, hydrocolloid, polymer, or equivalent and cover with secondary dressing/foam.    For wounds with moderate/large exudate or drainage, apply alginate, hydrofiber, polymer, or equivalent and cover with secondary dressing/foam.    For wounds with nonviable tissue requiring removal, apply chemical or mechanical debrider and cover with secondary dressing/foam.    For wounds with tunneling, dead space, or cavity, fill or pack with strip/guaze/kerlix to fit and cover with secondary dressing/foam.    For wounds with adequate granulation or epithelization, apply wound gel, hydrocolloid, polymer, collagen, or transparent film, and cover secondary dry dressing/foam.    For wounds that need additional secondary dressing to help pad or control additional drainage/exudates, add foam, absorbent pad or hydrocolloid.     For wounds with suspected or know infection, apply antimicrobial mesh and/or antimicrobial alginate/hydrofiber, or antimicrobial solution moistened gauze/kerlix, or equivalent and cover with secondary dressing foam.    Compression management needed for edema control, apply multilayer compression or tubular garment or equivalent. Offloading Management needed for pressure relief, apply offloading shoe/boot or equivalent. Standing Status:   Standing     Number of Occurrences:   1       Follow-up Information     Follow up With Specialties Details Why Contact Info    [address] In 2 weeks  [address] [facility] [address] [phone]           Treatment Note please see attached Discharge Instructions    Written patient dismissal instructions given to patient or POA.         Electronically signed by Alfredo Hammond DPM on 2/23/2022 at 11:36 AM

## 2022-02-23 NOTE — WOUND CARE
[address]  [address], [address]   Phone: [phone]  Fax: [phone]    Patient: Marck Arana MRN: [id number]  SSN: [id number]    YOB: 1950  Age: 80 y.o. Sex: male       Return Appointment: 2 weeks with Flaquito Morrow DPM    Instructions: Left plantar foot:  Cleanse with normal saline   Purachol AG (may substitute equivalent collagen): cut to approximate wound size, apply to wound bed. Secure with gauze and bandaid. Change dressing 3 times per week  Continue to offload with your shoe insert and limit walking as much as possible.        Should you experience increased redness, swelling, pain, foul odor, size of wound(s), or have a temperature over 101 degrees please contact the [address] [place] [address] Road at [phone] or if after hours contact your primary care physician or go to the hospital emergency department.     Signed By: Silvio Goodpasture     February 23, 2022

## 2022-02-23 NOTE — WOUND CARE
02/23/22 1130   Wound Foot Left;Plantar   Date First Assessed/Time First Assessed: 02/09/22 1051   Present on Hospital Admission: Yes  Wound Approximate Age at First Assessment (Weeks): 7 weeks  Location: Foot  Wound Location Orientation: Left;Plantar   Wound Image     Wound Etiology Other (Comment)  (neurophathic)   Dressing Status Intact   Cleansed Cleansed with saline   Dressing/Treatment   (acticoat, bandaid)   Wound Length (cm) 0.2 cm   Wound Width (cm) 0.2 cm   Wound Depth (cm) 0.3 cm   Wound Surface Area (cm^2) 0.04 cm^2   Change in Wound Size % 80   Wound Volume (cm^3) 0.012 cm^3   Wound Healing % 88   Post-Procedure Length (cm) 0.3 cm   Post-Procedure Width (cm) 0.3 cm   Post-Procedure Depth (cm) 0.3 cm   Post-Procedure Surface Area (cm^2) 0.09 cm^2   Post-Procedure Volume (cm^3) 0.027 cm^3   Wound Assessment Pink/red   Drainage Amount Small   Drainage Description Serosanguinous   Wound Odor None   Luna-Wound/Incision Assessment Hyperkeratosis (Callous)   Wound Thickness Description Full thickness

## 2022-02-23 NOTE — DISCHARGE INSTRUCTIONS
[address]  [address], [address]  Phone: [phone]  Fax: [phone]    Patient: Bessie Camacho MRN: [id number]  SSN: [id number]    YOB: 1950  Age: 80 y.o. Sex: male       Return Appointment: 2 weeks with Maggi Murrieta DPM    Instructions: Left plantar foot:  Cleanse with normal saline   Miranda (may substitute equivalent collagen): cut to approximate wound size, apply to wound bed. Secure with gauze and bandaid. Change dressing 3 times per week  Continue to offload with your shoe insert and limit walking as much as possible.        Should you experience increased redness, swelling, pain, foul odor, size of wound(s), or have a temperature over 101 degrees please contact the [address] [place] [address] Road at [phone] or if after hours contact your primary care physician or go to the hospital emergency department.     Signed By: Fazal Kaba     February 23, 2022

## 2022-03-09 ENCOUNTER — HOSPITAL ENCOUNTER (OUTPATIENT)
Dept: WOUND CARE | Age: 72
Discharge: HOME | End: 2022-03-09
Attending: PODIATRIST
Payer: COMMERCIAL

## 2022-03-09 VITALS
SYSTOLIC BLOOD PRESSURE: 185 MMHG | WEIGHT: 196 LBS | BODY MASS INDEX: 29.7 KG/M2 | DIASTOLIC BLOOD PRESSURE: 99 MMHG | HEIGHT: 68 IN | HEART RATE: 78 BPM

## 2022-03-09 DIAGNOSIS — L89.893 PRESSURE INJURY OF LEFT FOOT, STAGE 3 (HCC): Primary | ICD-10-CM

## 2022-03-09 DIAGNOSIS — M76.822 POSTERIOR TIBIAL TENDON DYSFUNCTION (PTTD) OF LEFT LOWER EXTREMITY: ICD-10-CM

## 2022-03-09 PROCEDURE — 99213 OFFICE O/P EST LOW 20 MIN: CPT

## 2022-03-09 RX ORDER — GENTAMICIN SULFATE 1 MG/G
OINTMENT TOPICAL ONCE
Status: CANCELLED | OUTPATIENT
Start: 2022-03-09 | End: 2022-03-09

## 2022-03-09 RX ORDER — CLOBETASOL PROPIONATE 0.5 MG/G
OINTMENT TOPICAL ONCE
Status: CANCELLED | OUTPATIENT
Start: 2022-03-09 | End: 2022-03-09

## 2022-03-09 RX ORDER — TRIAMCINOLONE ACETONIDE 1 MG/G
OINTMENT TOPICAL ONCE
Status: CANCELLED | OUTPATIENT
Start: 2022-03-09 | End: 2022-03-09

## 2022-03-09 RX ORDER — LIDOCAINE 50 MG/G
OINTMENT TOPICAL ONCE
Status: CANCELLED | OUTPATIENT
Start: 2022-03-09 | End: 2022-03-09

## 2022-03-09 RX ORDER — LIDOCAINE HYDROCHLORIDE 20 MG/ML
JELLY TOPICAL ONCE
Status: CANCELLED | OUTPATIENT
Start: 2022-03-09 | End: 2022-03-09

## 2022-03-09 RX ORDER — LIDOCAINE HYDROCHLORIDE 40 MG/ML
SOLUTION TOPICAL ONCE
Status: CANCELLED | OUTPATIENT
Start: 2022-03-09 | End: 2022-03-09

## 2022-03-09 RX ORDER — SILVER SULFADIAZINE 10 G/1000G
CREAM TOPICAL ONCE
Status: CANCELLED | OUTPATIENT
Start: 2022-03-09 | End: 2022-03-09

## 2022-03-09 RX ORDER — BACITRACIN ZINC AND POLYMYXIN B SULFATE 500; 1000 [USP'U]/G; [USP'U]/G
OINTMENT TOPICAL ONCE
Status: CANCELLED | OUTPATIENT
Start: 2022-03-09 | End: 2022-03-09

## 2022-03-09 RX ORDER — LIDOCAINE 40 MG/G
CREAM TOPICAL ONCE
Status: CANCELLED | OUTPATIENT
Start: 2022-03-09 | End: 2022-03-09

## 2022-03-09 RX ORDER — MUPIROCIN 20 MG/G
OINTMENT TOPICAL ONCE
Status: CANCELLED | OUTPATIENT
Start: 2022-03-09 | End: 2022-03-09

## 2022-03-09 RX ORDER — BACITRACIN 500 [USP'U]/G
OINTMENT TOPICAL ONCE
Status: CANCELLED | OUTPATIENT
Start: 2022-03-09 | End: 2022-03-09

## 2022-03-09 RX ORDER — BETAMETHASONE DIPROPIONATE 0.5 MG/G
OINTMENT TOPICAL ONCE
Status: CANCELLED | OUTPATIENT
Start: 2022-03-09 | End: 2022-03-09

## 2022-03-09 NOTE — PROGRESS NOTES
[address], [address]      [address] RECORD NUMBER:  [id number]  AGE: 80 y.o. RACE WHITE/NON-  GENDER: male  : 1950  EPISODE DATE:  3/9/2022    Subjective:     Chief Complaint   Patient presents with    Wound Check     left plantar foot 3/9/22        Alicia Mosher is a 80 y.o. WHITE/NON- male who presents with a wound to the left lower extremity at the plantar 3rd metatarsal head. He remains in the self created insole for offloading. He denies any drainage.       HISTORY of PRESENT ILLNESS HPI    Nature: Painless  Location: left forefoot  Duration: 2021  Onset: Patient states it started as callusing  Course: improving  Aggravating/Alleviating: Worsened with pressure   Treatment: silver collagen    Ulcer Identification:  Ulcer Type: pressure    Contributing Factors: chronic pressure    Wound: stage 3 pressure         PAST MEDICAL HISTORY    Past Medical History:   Diagnosis Date    Basal cell carcinoma 2014    Erectile dysfunction     Hypertension     Posterior tibial tendon dysfunction (PTTD) of left lower extremity 2022    Pressure injury of left foot, stage 3 ([address] 2022        PAST SURGICAL HISTORY    Past Surgical History:   Procedure Laterality Date    HX HEENT      tonsillectomy    HX MALIGNANT SKIN LESION EXCISION  2014    left ear lesion; Basal cell carcinoma       FAMILY HISTORY    Family History   Problem Relation Age of Onset    Cancer Mother 62        breast cancer    Cancer Father 80        colon cancer       SOCIAL HISTORY    Social History     Tobacco Use    Smoking status: Never Smoker    Smokeless tobacco: Never Used   Substance Use Topics    Alcohol use: No    Drug use: Never       ALLERGIES    No Known Allergies    MEDICATIONS    Current Outpatient Medications on File Prior to Encounter   Medication Sig Dispense Refill    metFORMIN (GLUCOPHAGE) 500 mg tablet Take 1 Tablet by mouth two (2) times daily (with meals) for 30 days. 60 Tablet 5    latanoprost (XALATAN) 0.005 % ophthalmic solution INSTILL 1 DROP INTO BOTH EYES EVERY NIGHT      amLODIPine-benazepril (LOTREL) 5-20 mg per capsule Take 1 Capsule by mouth daily. 90 Capsule 3    sildenafil citrate (VIAGRA) 100 mg tablet Take 1 Tab by mouth as needed. 5 Tab 5     No current facility-administered medications on file prior to encounter. REVIEW OF SYSTEMS    Pertinent items are noted in HPI. Objective:     Visit Vitals  BP (!) 185/99 (BP 1 Location: Right upper arm, BP Patient Position: At rest)   Pulse 78   Ht 5' 8\" (1.727 m)   Wt 88.9 kg (196 lb)   BMI 29.80 kg/m²       Wt Readings from Last 3 Encounters:   03/09/22 88.9 kg (196 lb)   02/23/22 89.8 kg (198 lb)   02/21/22 89.4 kg (197 lb)       PHYSICAL EXAM    General: well developed, well nourished, pleasant , NAD. Hygiene good  Psych: cooperative. Pleasant. No anxiety or depression. Normal mood and affect. HEENT: Normocephalic, atraumatic. EOMI. Conjunctiva clear. No scleral icterus. Neck: Normal range of motion. No masses. Chest: Good air entry bilaterally. Respirations nonlabored  Cardio: Normal heart sounds,no rubs, murmurs or gallops  Abdomen: Soft, nontender, nondistended, normoactive bowel sounds    Vascular: DP and PT pulses are palpable at 2/4 bilateral. Skin temperature is uniform from proximal to distal bilateral. Hair growth is absent bilateral. No erythema, edema, heat is appreciated bilateral. No evidence of cellulitis. Derm: Nails 1-5 bilateral are thickened, discolored and with subungual debris. No paronychial infections are noted. Skin is atrophic and xerotic. No subcutaneous masses or hyperpigmented lesions are present.    Neuro: Epicritic sensation is present bilateral. Protective sensation is pesent with 5.07 SWMF testing to all 10 sites bilateral. Muscle tone and bulk is symmetric bilateral.    Full thickness ulceration is noted to the sub 3rd metatarsal head left foot. Thick hyperkeratotic rim with granular base. No erythema, edema, purulence or odor. Soft end feel with no bone exposed or palpable. No undermining or sinus track is noted. No fluctuance with palpation. Left foot with semi reducible posterior tibial tendon dysfunction with loss of medial arch, valgus heel and abducted forefoot. Wound Foot Left;Plantar (Active)   Wound Image   03/09/22 1113   Wound Etiology Other (Comment) 02/23/22 1130   Dressing Status Intact 03/09/22 1113   Cleansed Cleansed with saline 03/09/22 1113   Dressing/Treatment Band-Aid/Adhesive bandage 02/09/22 1051   Offloading for Diabetic Foot Ulcers Ortho wedge/inserts 03/09/22 1113   Wound Length (cm) 0.1 cm 03/09/22 1113   Wound Width (cm) 0.1 cm 03/09/22 1113   Wound Depth (cm) 0.1 cm 03/09/22 1113   Wound Surface Area (cm^2) 0.01 cm^2 03/09/22 1113   Change in Wound Size % 95 03/09/22 1113   Wound Volume (cm^3) 0.001 cm^3 03/09/22 1113   Wound Healing % 99 03/09/22 1113   Post-Procedure Length (cm) 0.3 cm 02/23/22 1130   Post-Procedure Width (cm) 0.3 cm 02/23/22 1130   Post-Procedure Depth (cm) 0.3 cm 02/23/22 1130   Post-Procedure Surface Area (cm^2) 0.09 cm^2 02/23/22 1130   Post-Procedure Volume (cm^3) 0.027 cm^3 02/23/22 1130   Wound Assessment Epithelialization 03/09/22 1113   Drainage Amount None 03/09/22 1113   Drainage Description Serosanguinous 02/23/22 1130   Wound Odor None 03/09/22 1113   Luna-Wound/Incision Assessment Hyperkeratosis (Callous) 03/09/22 1113   Wound Thickness Description Full thickness 03/09/22 1113   Number of days: 28        DATA REVIEW:  No results found for this or any previous visit (from the past 336 hour(s)). Assessment and Plan:     Problem List Items Addressed This Visit     Pressure ulcer stage 3, left foot  PTTD left foot        Patient examined and evaluated. Educated patient and barriers to healing.  Pressure reduction is paramount due to location of the wound. His current offloading insole that he created has made a big impact - continue to wear this at all times, inside and outside the home. Infection control is required to prevent loss of limb/life - maintain dressing coverage at all times, do not soak or get wet in the shower, wash hands before and after dressing changes. Monitor for erythema, edema, odor, and thick yellow drainage and contact the center immediately if noted. Proper nutrition to support skin growth includes increased protein, vitamins and amino acids - animal based foods and Francisco recommended. Moisture management to prevent maceration and dryness - keep wound covered at all times outside dressing care, do not \"air out\" or soak. Dressing will consist of acticoat three times a week. Return in 2 weeks. Patient instructed on the following: Follow all wound dressing instructions. Do not get dressing or wound wet. May shower if wound can be effectively kept dry. Cast covers may be purchased at [facility]. Should you experience increased redness, swelling, pain, foul odor, size of wound(s), or have a temperature over 101 degrees please contact the [address] [place] [address] Road at [phone] or if after hours contact your primary care physician or go to the hospital emergency department. Orders Placed This Encounter    WOUND CARE, DRESSING CHANGE     Left plantar foot:  Cleanse with normal saline   Acticoat Flex 3: cut top approximate size of wound, apply to wound bed. Secure with gauze and bandaid. Change dressing 3 times per week  Continue to offload with your shoe insert and limit walking as much as possible.         Standing Status:   Standing     Number of Occurrences:   1    INITIATE OUTPATIENT WOUND CARE PROTOCOL     Cleanse wound with saline. If wound contains bioburden or contamination, cleanse with wound cleanser or antimicrobial solution.     For normal periwound tissue without irritation nor maceration, apply topical skin protectant. For periwound tissue with irritation and/or maceration, apply zinc based product, topical steroid cream/ointment, or equivalent. For wounds with dry firm black eschar and/or without exudate, apply betadine and leave open to air. For wounds with scant/small to no exudate or drainage, apply wound gel, hydrocolloid, polymer, or equivalent and cover with secondary dressing/foam.    For wounds with moderate/large exudate or drainage, apply alginate, hydrofiber, polymer, or equivalent and cover with secondary dressing/foam.    For wounds with nonviable tissue requiring removal, apply chemical or mechanical debrider and cover with secondary dressing/foam.    For wounds with tunneling, dead space, or cavity, fill or pack with strip/guaze/kerlix to fit and cover with secondary dressing/foam.    For wounds with adequate granulation or epithelization, apply wound gel, hydrocolloid, polymer, collagen, or transparent film, and cover secondary dry dressing/foam.    For wounds that need additional secondary dressing to help pad or control additional drainage/exudates, add foam, absorbent pad or hydrocolloid. For wounds with suspected or know infection, apply antimicrobial mesh and/or antimicrobial alginate/hydrofiber, or antimicrobial solution moistened gauze/kerlix, or equivalent and cover with secondary dressing foam.    Compression management needed for edema control, apply multilayer compression or tubular garment or equivalent. Offloading Management needed for pressure relief, apply offloading shoe/boot or equivalent.      Standing Status:   Standing     Number of Occurrences:   1       Follow-up Information     Follow up With Specialties Details Why Contact Info    [address] In 2 weeks For wound re-check William Bunch [address] [facility] [address] [phone]           Treatment Note please see attached Discharge Instructions    Written patient dismissal instructions given to patient or POA.         Electronically signed by Marcelo Case DPM on 3/9/2022 at 11:36 AM

## 2022-03-09 NOTE — WOUND CARE
03/09/22 1113   Wound Foot Left;Plantar   Date First Assessed/Time First Assessed: 02/09/22 1051   Present on Hospital Admission: Yes  Wound Approximate Age at First Assessment (Weeks): 7 weeks  Location: Foot  Wound Location Orientation: Left;Plantar   Wound Image    Wound Etiology   (neuropathic)   Dressing Status Intact   Cleansed Cleansed with saline   Dressing/Treatment   (purachol ag and bandaid)   Offloading for Diabetic Foot Ulcers Ortho wedge/inserts   Wound Length (cm) 0.1 cm   Wound Width (cm) 0.1 cm   Wound Depth (cm) 0.1 cm   Wound Surface Area (cm^2) 0.01 cm^2   Change in Wound Size % 95   Wound Volume (cm^3) 0.001 cm^3   Wound Healing % 99   Wound Assessment Epithelialization   Drainage Amount None   Wound Odor None   Luna-Wound/Incision Assessment Hyperkeratosis (Callous)   Wound Thickness Description Full thickness

## 2022-03-09 NOTE — DISCHARGE INSTRUCTIONS
[address]  [address], [address] [facility] [place]   Phone: [phone]  Fax: [phone]    Patient: Harmony Martinez MRN: [id number]  SSN: [id number]    YOB: 1950  Age: 80 y.o. Sex: male       Return Appointment: 2 weeks with Isabel Bello DPM    Instructions: Left plantar foot:  Cleanse with normal saline   Acticoat Flex 3: cut top approximate size of wound, apply to wound bed. Secure with gauze and bandaid. Change dressing 3 times per week  Continue to offload with your shoe insert and limit walking as much as possible. Should you experience increased redness, swelling, pain, foul odor, size of wound(s), or have a temperature over 101 degrees please contact the [address] [place] [address] Road at [phone] or if after hours contact your primary care physician or go to the hospital emergency department.     Signed By: Tk Miller RN     March 9, 2022

## 2022-03-09 NOTE — WOUND CARE
[address]  [address], [address] [place]   Phone: [phone]  Fax: [phone]    Patient: Harmony Martinez MRN: [id number]  SSN: [id number]    YOB: 1950  Age: 80 y.o. Sex: male       Return Appointment: 2 weeks with Isabel Bello, NEW    Instructions: Left plantar foot:  Cleanse with normal saline   Acticoat Flex 3: cut top approximate size of wound, apply to wound bed. Secure with gauze and bandaid. Change dressing 3 times per week  Continue to offload with your shoe insert and limit walking as much as possible. Should you experience increased redness, swelling, pain, foul odor, size of wound(s), or have a temperature over 101 degrees please contact the [address] [place] [address] Road at [phone] or if after hours contact your primary care physician or go to the hospital emergency department.     Signed By: Tk Miller RN     March 9, 2022

## 2022-03-18 PROBLEM — E11.9 TYPE 2 DIABETES MELLITUS (HCC): Status: ACTIVE | Noted: 2022-02-21

## 2022-03-19 PROBLEM — M76.822 POSTERIOR TIBIAL TENDON DYSFUNCTION (PTTD) OF LEFT LOWER EXTREMITY: Status: ACTIVE | Noted: 2022-02-09

## 2022-03-19 PROBLEM — L89.893 PRESSURE INJURY OF LEFT FOOT, STAGE 3 (HCC): Status: ACTIVE | Noted: 2022-02-09

## 2022-03-23 ENCOUNTER — HOSPITAL ENCOUNTER (OUTPATIENT)
Dept: WOUND CARE | Age: 72
Discharge: HOME | End: 2022-03-23
Attending: PODIATRIST
Payer: COMMERCIAL

## 2022-03-23 VITALS
HEIGHT: 68 IN | HEART RATE: 80 BPM | WEIGHT: 195 LBS | DIASTOLIC BLOOD PRESSURE: 94 MMHG | BODY MASS INDEX: 29.55 KG/M2 | SYSTOLIC BLOOD PRESSURE: 171 MMHG

## 2022-03-23 DIAGNOSIS — L89.893 PRESSURE INJURY OF LEFT FOOT, STAGE 3 (HCC): Primary | ICD-10-CM

## 2022-03-23 DIAGNOSIS — M76.822 POSTERIOR TIBIAL TENDON DYSFUNCTION (PTTD) OF LEFT LOWER EXTREMITY: ICD-10-CM

## 2022-03-23 PROCEDURE — 99211 OFF/OP EST MAY X REQ PHY/QHP: CPT

## 2022-03-23 RX ORDER — LIDOCAINE HYDROCHLORIDE 40 MG/ML
SOLUTION TOPICAL ONCE
Status: CANCELLED | OUTPATIENT
Start: 2022-03-23 | End: 2022-03-23

## 2022-03-23 RX ORDER — CLOBETASOL PROPIONATE 0.5 MG/G
OINTMENT TOPICAL ONCE
Status: CANCELLED | OUTPATIENT
Start: 2022-03-23 | End: 2022-03-23

## 2022-03-23 RX ORDER — BETAMETHASONE DIPROPIONATE 0.5 MG/G
OINTMENT TOPICAL ONCE
Status: CANCELLED | OUTPATIENT
Start: 2022-03-23 | End: 2022-03-23

## 2022-03-23 RX ORDER — LIDOCAINE HYDROCHLORIDE 20 MG/ML
JELLY TOPICAL ONCE
Status: CANCELLED | OUTPATIENT
Start: 2022-03-23 | End: 2022-03-23

## 2022-03-23 RX ORDER — LIDOCAINE 40 MG/G
CREAM TOPICAL ONCE
Status: CANCELLED | OUTPATIENT
Start: 2022-03-23 | End: 2022-03-23

## 2022-03-23 RX ORDER — BACITRACIN ZINC AND POLYMYXIN B SULFATE 500; 1000 [USP'U]/G; [USP'U]/G
OINTMENT TOPICAL ONCE
Status: CANCELLED | OUTPATIENT
Start: 2022-03-23 | End: 2022-03-23

## 2022-03-23 RX ORDER — GENTAMICIN SULFATE 1 MG/G
OINTMENT TOPICAL ONCE
Status: CANCELLED | OUTPATIENT
Start: 2022-03-23 | End: 2022-03-23

## 2022-03-23 RX ORDER — BACITRACIN 500 [USP'U]/G
OINTMENT TOPICAL ONCE
Status: CANCELLED | OUTPATIENT
Start: 2022-03-23 | End: 2022-03-23

## 2022-03-23 RX ORDER — SILVER SULFADIAZINE 10 G/1000G
CREAM TOPICAL ONCE
Status: CANCELLED | OUTPATIENT
Start: 2022-03-23 | End: 2022-03-23

## 2022-03-23 RX ORDER — TRIAMCINOLONE ACETONIDE 1 MG/G
OINTMENT TOPICAL ONCE
Status: CANCELLED | OUTPATIENT
Start: 2022-03-23 | End: 2022-03-23

## 2022-03-23 RX ORDER — MUPIROCIN 20 MG/G
OINTMENT TOPICAL ONCE
Status: CANCELLED | OUTPATIENT
Start: 2022-03-23 | End: 2022-03-23

## 2022-03-23 RX ORDER — LIDOCAINE 50 MG/G
OINTMENT TOPICAL ONCE
Status: CANCELLED | OUTPATIENT
Start: 2022-03-23 | End: 2022-03-23

## 2022-03-23 NOTE — DISCHARGE INSTRUCTIONS
[address]  [address], [address]  Phone: [phone]  Fax: [phone]    Patient: Sherman Toure MRN: [id number]  SSN: [id number]    YOB: 1950  Age: 71 y.o. Sex: male       Return Appointment: discharge from wound center with Steven Opitz, DPM    Instructions: Wound is healed  Continue offloading with shoe insert  Follow up at podiatrist office for callus management and toe nail trimming  Discharge from wound center    Should you experience increased redness, swelling, pain, foul odor, size of wound(s), or have a temperature over 101 degrees please contact the [address] [place] [address] Road at [phone] or if after hours contact your primary care physician or go to the hospital emergency department.     Signed By: Jovanna Auguste RN     March 23, 2022

## 2022-03-23 NOTE — WOUND CARE
03/23/22 1132   Wound Foot Left;Plantar   Date First Assessed/Time First Assessed: 02/09/22 1051   Present on Hospital Admission: Yes  Wound Approximate Age at First Assessment (Weeks): 7 weeks  Location: Foot  Wound Location Orientation: Left;Plantar   Wound Image    Wound Etiology   (neuropathic)   Dressing Status Intact   Cleansed Cleansed with saline   Dressing/Treatment Band-Aid/Adhesive bandage   Offloading for Diabetic Foot Ulcers Ortho wedge/inserts   Wound Length (cm) 0 cm   Wound Width (cm) 0 cm   Wound Depth (cm) 0 cm   Wound Surface Area (cm^2) 0 cm^2   Change in Wound Size % 100   Wound Volume (cm^3) 0 cm^3   Wound Healing % 100   Wound Assessment Epithelialization   Drainage Amount None   Wound Odor None   Luna-Wound/Incision Assessment Hyperkeratosis (Callous)

## 2022-03-23 NOTE — WOUND CARE
[address]  [address], [address]  Phone: [phone]  Fax: [phone]    Patient: Denise Lacey MRN: [id number]  SSN: [id number]    YOB: 1950  Age: 71 y.o. Sex: male       Return Appointment: discharge from wound center with Cristopher Marrero DPM    Instructions: Wound is healed  Continue offloading with shoe insert  Follow up at podiatrist office for callus management and toe nail trimming  Discharge from wound center    Should you experience increased redness, swelling, pain, foul odor, size of wound(s), or have a temperature over 101 degrees please contact the [address] [place] [address] Road at [phone] or if after hours contact your primary care physician or go to the hospital emergency department.     Signed By: Leslie Pruitt RN     March 23, 2022

## 2022-03-23 NOTE — PROGRESS NOTES
[address] [place] [address] Drive [address] RECORD NUMBER:  [id number]  AGE: 71 y.o. RACE WHITE/NON-  GENDER: male  : 1950  EPISODE DATE:  3/23/2022    Subjective:     Chief Complaint   Patient presents with    Wound Check     left plantar foot 3/23/22        Edward Bustos is a 71 y.o. WHITE/NON- male who presents with a wound to the left lower extremity at the plantar 3rd metatarsal head. He remains in the self created insole for offloading.        HISTORY of PRESENT ILLNESS HPI    Nature: Painless  Location: left forefoot  Duration: 2021  Onset: Patient states it started as callusing  Course: improving  Aggravating/Alleviating: Worsened with pressure   Treatment: silver collagen    Ulcer Identification:  Ulcer Type: pressure    Contributing Factors: chronic pressure    Wound: stage 3 pressure         PAST MEDICAL HISTORY    Past Medical History:   Diagnosis Date    Basal cell carcinoma 2014    Erectile dysfunction     Hypertension     Posterior tibial tendon dysfunction (PTTD) of left lower extremity 2022    Pressure injury of left foot, stage 3 ([address] 2022        PAST SURGICAL HISTORY    Past Surgical History:   Procedure Laterality Date    HX HEENT      tonsillectomy    HX MALIGNANT SKIN LESION EXCISION  2014    left ear lesion; Basal cell carcinoma       FAMILY HISTORY    Family History   Problem Relation Age of Onset    Cancer Mother 62        breast cancer    Cancer Father 80        colon cancer       SOCIAL HISTORY    Social History     Tobacco Use    Smoking status: Never Smoker    Smokeless tobacco: Never Used   Substance Use Topics    Alcohol use: No    Drug use: Never       ALLERGIES    No Known Allergies    MEDICATIONS    Current Outpatient Medications on File Prior to Encounter   Medication Sig Dispense Refill    metFORMIN (GLUCOPHAGE) 500 mg tablet Take 1 Tablet by mouth two (2) times daily (with meals) for 30 days. 60 Tablet 5    latanoprost (XALATAN) 0.005 % ophthalmic solution INSTILL 1 DROP INTO BOTH EYES EVERY NIGHT      amLODIPine-benazepril (LOTREL) 5-20 mg per capsule Take 1 Capsule by mouth daily. 90 Capsule 3    sildenafil citrate (VIAGRA) 100 mg tablet Take 1 Tab by mouth as needed. 5 Tab 5     No current facility-administered medications on file prior to encounter. REVIEW OF SYSTEMS    Pertinent items are noted in HPI. Objective:     Visit Vitals  BP (!) 171/94 (BP 1 Location: Left upper arm, BP Patient Position: At rest)   Pulse 80   Ht 5' 8\" (1.727 m)   Wt 88.5 kg (195 lb)   BMI 29.65 kg/m²       Wt Readings from Last 3 Encounters:   03/23/22 88.5 kg (195 lb)   03/09/22 88.9 kg (196 lb)   02/23/22 89.8 kg (198 lb)       PHYSICAL EXAM    General: well developed, well nourished, pleasant , NAD. Hygiene good  Psych: cooperative. Pleasant. No anxiety or depression. Normal mood and affect. HEENT: Normocephalic, atraumatic. EOMI. Conjunctiva clear. No scleral icterus. Neck: Normal range of motion. No masses. Chest: Good air entry bilaterally. Respirations nonlabored  Cardio: Normal heart sounds,no rubs, murmurs or gallops  Abdomen: Soft, nontender, nondistended, normoactive bowel sounds    Vascular: DP and PT pulses are palpable at 2/4 bilateral. Skin temperature is uniform from proximal to distal bilateral. Hair growth is absent bilateral. No erythema, edema, heat is appreciated bilateral. No evidence of cellulitis. Derm: Nails 1-5 bilateral are thickened, discolored and with subungual debris. No paronychial infections are noted. Skin is atrophic and xerotic. No subcutaneous masses or hyperpigmented lesions are present.    Neuro: Epicritic sensation is present bilateral. Protective sensation is pesent with 5.07 SWMF testing to all 10 sites bilateral. Muscle tone and bulk is symmetric bilateral.    Prior full thickness ulceration to the sub 3rd metatarsal head left foot with thin layer of hyperkeratosis and intact skin underneath. No drainage. No pain. Left foot with semi reducible posterior tibial tendon dysfunction with loss of medial arch, valgus heel and abducted forefoot. DATA REVIEW:  No results found for this or any previous visit (from the past 336 hour(s)). Assessment and Plan:     Problem List Items Addressed This Visit     Pressure ulcer stage 3, left foot - healed  PTTD left foot         Wound is now healed. He is to wear the custom made orthotic at all times to prevent recurrence. He does not have a podiatrist and gave him information for my private office today. He will follow here as needed. Orders Placed This Encounter    WOUND CARE, DRESSING CHANGE     Wound is healed  Continue offloading with shoe insert  Follow up at podiatrist office for callus management and toe nail trimming  Discharge from wound center     Standing Status:   Standing     Number of Occurrences:   1       Follow-up Information     Follow up With Specialties Details Why Contact Info    [address]  discharge from wound center [address] [facility] [address] [phone]           Treatment Note please see attached Discharge Instructions    Written patient dismissal instructions given to patient or POA.         Electronically signed by Dilan Pearson DPM on 3/23/2022 at 11:36 AM

## 2022-04-11 ENCOUNTER — NURSE TRIAGE (OUTPATIENT)
Dept: OTHER | Facility: CLINIC | Age: 72
End: 2022-04-11

## 2022-04-11 NOTE — TELEPHONE ENCOUNTER
Received call from [address] [employer] at [facility] with [employer] Complaint. Subjective: Caller states having  balance issues when walking,had to use walker last week. Current Symptoms: Upper back muscles and neck muscles feel tight. Felt off balance when walking this morning, states is moderate in severity. Balance issues have been intermittent for past several weeks. Onset: Several weeks intermittent    Associated Symptoms: NA    Pain Severity:Denies    Temperature:Denies    What has been tried:     Recommended disposition: See in Office Today    Care advice provided, patient verbalizes understanding; denies any other questions or concerns; instructed to call back for any new or worsening symptoms. Patient/Caller agrees with recommended disposition; writer provided warm transfer to [facility] at [facility] for appointment scheduling    Attention Provider: Thank you for allowing me to participate in the care of your patient. The patient was connected to triage in response to information provided to the [facility]. Please do not respond through this encounter as the response is not directed to a shared pool.     Reason for Disposition   MODERATE dizziness (e.g., interferes with normal activities) (Exception: dizziness caused by heat exposure, sudden standing, or poor fluid intake)    Protocols used: DIZZINESS-ADULT-OH

## 2022-04-28 ENCOUNTER — HOSPITAL ENCOUNTER (OUTPATIENT)
Dept: PHYSICAL THERAPY | Age: 72
Discharge: HOME | End: 2022-04-28
Attending: FAMILY MEDICINE
Payer: COMMERCIAL

## 2022-04-28 DIAGNOSIS — R26.81 UNSTEADY GAIT WHEN WALKING: ICD-10-CM

## 2022-04-28 PROCEDURE — 97162 PT EVAL MOD COMPLEX 30 MIN: CPT

## 2022-04-28 PROCEDURE — 97140 MANUAL THERAPY 1/> REGIONS: CPT

## 2022-04-28 PROCEDURE — 97110 THERAPEUTIC EXERCISES: CPT

## 2022-04-28 NOTE — THERAPY EVALUATION
Nichole Baca  : 1950  Primary: [address] [facility]*  Secondary:  [facility]rapy [facility]  [facility] [address], [place] [address]  [phone]   [phone]         OUTPATIENT PHYSICAL THERAPY:Initial Assessment 2022    ICD-10: Treatment Diagnosis: DIFFICULTY WALKING, NOT ELSEWHERE CLASSIFIED R26.2; Unsteady gait when walking [R26.81]    Precautions/Allergies: non reported  Fall Risk Score: 1 (? 5 = High Risk)  MD Orders: evaluate and treat  TREATMENT PLAN:  Effective Dates: 2022 TO 2022 (90 days). Frequency/Duration: as needed  for 90 Day(s) MEDICAL/REFERRING DIAGNOSIS:Unsteady gait when walking [R26.81]  DATE OF ONSET: 2022  REFERRING PHYSICIAN:Yuni Garcia MD  RETURN PHYSICIAN APPOINTMENT: did not specify       ASSESSMENT:  Mr. Kayleigh Galvin presents to physical therapy with symptoms of unsteadiness with gait . The examination reveals slight gaze instability with head movement and lower extremity wkns . The examination is of low complexity due to a stable clinical presentation. Skilled physical therapy is recommended to progress the plan of care in order for the patient to return to full function with minimal symptoms. PROBLEM LIST (Impacting functional limitations):  1. Gaze instability  2. Calf, quad and hip wkns INTERVENTIONS PLANNED:  1. Home Exercise Program (HEP)  2. Manual Therapy  3. Therapeutic Exercise/Strengthening  4. Cryotherapy w/ vaso-pneumatic compression  5. Balance training     GOALS: (Goals have been discussed and agreed upon with patient.)  SHORT-TERM FUNCTIONAL GOALS: Time Frame: 2-4 wks  1. Patient will report 25-50% reduction in overall symptoms  2. Patient will be compliant with HEP and plan of care  DISCHARGE GOALS: Time Frame: 6-8 wk  1. Patient will report % reduction in overall symptoms in order to be able to have full function with decreased symptoms  2.   Patient will report feeling comfortable progressing their own plan of care with the home program prescribed  3. Patient will be able to have gaze stability with full cervical rotation  4. Patient will report feeling stable while walking   4. Patient will improve on the LEFS by 8-10 points in order to demonstrate improved function with less pain    Rehabilitation Potential For Stated Goals: 61 Telecardia therapy, I certify that the treatment plan above will be carried out by a therapist or under their direction. Thank you for this referral,  Karly Beatty PT,DPT    Referring Physician Signature: Arash Olivares MD _____________________________________________________ Date: __________________________________          HISTORY:   History of Present Injury/Illness (Reason for Referral): reports that up through January he was walking about an hour a day. He developed a blister on his left foot. He had to go to the wound clinic and they advised him not to walk distances. At this time he started feeling more unstable on his feet and has been leaning more towards his R. He has eliminated some foods which he thinks has helped. He eliminated dry beans and grains which has helped  Past Medical History/Comorbidities:   Past Medical History:   Diagnosis Date    Basal cell carcinoma 6/2014    Erectile dysfunction     Hypertension     Posterior tibial tendon dysfunction (PTTD) of left lower extremity 2/9/2022    Pressure injury of left foot, stage 3 ([facility] [address] 2/9/2022     Social History/Living Environment: lives in a single family home  Prior Level of Function/Work/Activity: independent  Current Medications:    Current Outpatient Medications:     latanoprost (XALATAN) 0.005 % ophthalmic solution, INSTILL 1 DROP INTO BOTH EYES EVERY NIGHT, Disp: , Rfl:     amLODIPine-benazepril (LOTREL) 5-20 mg per capsule, Take 1 Capsule by mouth daily. , Disp: 90 Capsule, Rfl: 3    sildenafil citrate (VIAGRA) 100 mg tablet, Take 1 Tab by mouth as needed. , Disp: 5 Tab, Rfl: 5     Date Last Reviewed: 4/28/2022     Ambulatory/Rehab Services H2 Model Falls Risk Assessment    Risk Factors:       (1)  Gender [Male] Ability to Rise from Chair:       (0)  Ability to rise in a single movement    Falls Prevention Plan:       No modifications necessary   Total: (5 or greater = High Risk): 1    ©2010 [facility] [employer]. All Rights Reserved. [facility] [employer] Patent #[id number]. Federal Law prohibits the replication, distribution or use without written permission from [facility] [employer]      Number of Personal Factors/Comorbidities that affect the Plan of Care: 0: LOW COMPLEXITY   EXAMINATION:   (Abbreviations: P-pain, NP- no pain, wnl-within normal limits)  Observation/Orthostatic Postural Assessment:    Relaxed standing posture:  · Stands with knees flexed slightly  · Large callus at 2nd MTP    Palpation/tone/tissue texture:    ASIS: symmetrical  PSIS:-  Leg Length: supine: symmetrical        Long Sitting:      Soft tissue:  · Gastroc/soleus/posterior tibialis: increased tightness  · Planter fascia:  -      Foot Exam Date:  5/4/2022       Left Right   Talocrural Joint: Very limited PF  Very limited   Rear foot: (neutral to relaxed: 4-6                      deg-normal) wnl wnl   Mid-foot (navicular drop, <7-normal) wnl wnl   Forefoot: (position, mobility) wnl wnl   First ray position/hallux limitus test: wnl wnl   Windlass test: wnl wnl   Foot intrinsic strength:  wnl wnl       ROM:   Multisegmental ROM Date:  5/4/2022       Flexion -   Extension -   Rotation L -   Rotation R -      Ankle ROM Date:  5/4/2022       Right Left   DF Mild limitation Mild limitation    PF Very limited  Very limited    Inversion wnl wnl   Eversion wnl wnl           Strength: Foot and ankle Strength Date:  5/4/2022       Right Left   DF 4 4   PF 3 3   Inversion 4 4   Eversion 4 4                Special Tests:    Ankle stability:  Anterior Drawer: stable   Talar tilt: stable Kleiger:stable    Gaze stability: decreased at end range cervical rotations    Neurological Screen:   Myotomes: S1 wkns bilateral      Dermatomes: intact in bilateral LE's         Reflexes: n/a         Neural Tension Tests: SLR (-)  Functional Mobility:    · Double leg squat:  Partial   · Gait Pattern: ambulates with decreased trunk and pelvic mobility, stable throughout entire gait phase  Balance:  Single leg   L: < 3 sec, maintains balance  R: <3 sec, maintains balance     Body Structures Involved:  1. Eyes and Ears  2. Joints  3. Muscles Body Functions Affected:  1. Sensory/Pain  2. Neuromusculoskeletal  3. Movement Related Activities and Participation Affected:  1. General Tasks and Demands  2. Mobility  3. Self Care  4. Domestic Life  5. Community, Social and [place]   Number of elements that affect the Plan of Care: 4+: HIGH COMPLEXITY   CLINICAL PRESENTATION:   Presentation: Evolving clinical presentation with changing clinical characteristics: MODERATE COMPLEXITY   CLINICAL DECISION MAKING:   Outcome Measure: Tool Used: Lower Extremity Functional Scale (LEFS) next visit  Score:  Initial: -/80 Most Recent: X/80 (Date: -- )   Interpretation of Score: 20 questions each scored on a 5 point scale with 0 representing \"extreme difficulty or unable to perform\" and 4 representing \"no difficulty\". The lower the score, the greater the functional disability. 80/80 represents no disability. Minimal detectable change is 9 points. Medical Necessity:   · Patient is expected to demonstrate progress in strength, range of motion and symptom levels to return to full function. Reason for Services/Other Comments:  · Patient continues to require skilled intervention due to ongoing symptoms.    Use of outcome tool(s) and clinical judgement create a POC that gives a: Questionable prediction of patient's progress: MODERATE COMPLEXITY       · Pain: Initial:    0/10 Post Session:  0/10 ·   Compliance with Program/Exercises: Will assess as treatment progresses. · Recommendations/Intent for next treatment session: \"Next visit will focus on progressing the patients plan of care\".     Future Appointments   Date Time Provider [place]   5/26/2022  2:45 PM Hunter Foote MD SSA HTF HTF     Total Treatment Duration: evaluation 35 min, TE-10 min  PT Patient Time In/Time Out  Time In: 1605  Time Out: [address]      Kristi Miller, PT, DPT

## 2022-04-28 NOTE — PROGRESS NOTES
Natalia Haile  : 1950  Primary: [address]*  Secondary:  Therapy Center at [facility]  [address], [address], [place] [address]  [phone]   [phone]        OUTPATIENT PHYSICAL THERAPY: Daily Treatment Note  2022      ICD-10: Treatment Diagnosis: DIFFICULTY WALKING, NOT ELSEWHERE CLASSIFIED R26.2; Unsteady gait when walking [R26.81]    Precautions/Allergies: non reported  Fall Risk Score: 1 (? 5 = High Risk)  MD Orders: evaluate and treat  TREATMENT PLAN:  Effective Dates: 2022 TO 2022 (90 days). Frequency/Duration: as needed  for 90 Day(s) MEDICAL/REFERRING DIAGNOSIS:Unsteady gait when walking [R26.81]  DATE OF ONSET: 2022  REFERRING PHYSICIAN:Dejah Garcia MD  RETURN PHYSICIAN APPOINTMENT: did not specify        Pre-treatment Symptoms/Complaints:  Reports difficulty with walking at times  Pain: Initial:     0/10 Post Session:  0/10   Medications Last Reviewed:  2022    Updated Objective Findings:  See evaluation   TREATMENT:   Therapeutic Exercise: (10 min) (Done in order to improve mobility, strength, function and overall understanding of condition)   Date:  2022     Activity/Exercise Parameters   Education Discussed HEP, plan of care   Gaze stability Cervical rotation with focus on an object   Heel raises 10x     Manual Therapy: (-) (Done to improve mobility, reduce tone, guarding and pain)  ·      Modalities: (-)  [employer]  Treatment/Session Summary:    · Response to Treatment: patient tolerated the treatment and evaluation well. · Communication/Consultation:  None today  · Equipment provided today:  None today  · Recommendations/Intent for next treatment session: Next visit will focus on progressing established plan of care.   Total Treatment Billable Duration:  10 min, evaluation 35 min    PT Patient Time In/Time Out  Time In: 1605  Time Out: 1650, PT, DPT

## 2023-01-25 DIAGNOSIS — I10 ESSENTIAL (PRIMARY) HYPERTENSION: ICD-10-CM

## 2023-01-25 RX ORDER — AMLODIPINE BESYLATE AND BENAZEPRIL HYDROCHLORIDE 5; 20 MG/1; MG/1
CAPSULE ORAL
Qty: 90 CAPSULE | Refills: 3 | OUTPATIENT
Start: 2023-01-25

## 2023-02-02 ENCOUNTER — OFFICE VISIT (OUTPATIENT)
Dept: FAMILY MEDICINE CLINIC | Facility: CLINIC | Age: 73
End: 2023-02-02
Payer: COMMERCIAL

## 2023-02-02 VITALS
TEMPERATURE: 97.8 F | OXYGEN SATURATION: 99 % | WEIGHT: 183 LBS | HEART RATE: 80 BPM | SYSTOLIC BLOOD PRESSURE: 138 MMHG | BODY MASS INDEX: 27.83 KG/M2 | DIASTOLIC BLOOD PRESSURE: 84 MMHG

## 2023-02-02 DIAGNOSIS — I10 PRIMARY HYPERTENSION: ICD-10-CM

## 2023-02-02 DIAGNOSIS — E11.9 DIET-CONTROLLED DIABETES MELLITUS (HCC): Primary | ICD-10-CM

## 2023-02-02 LAB
GLUCOSE, POC: 79 MG/DL
HBA1C MFR BLD: 6.2 %

## 2023-02-02 PROCEDURE — 3075F SYST BP GE 130 - 139MM HG: CPT | Performed by: FAMILY MEDICINE

## 2023-02-02 PROCEDURE — 83036 HEMOGLOBIN GLYCOSYLATED A1C: CPT | Performed by: FAMILY MEDICINE

## 2023-02-02 PROCEDURE — 3079F DIAST BP 80-89 MM HG: CPT | Performed by: FAMILY MEDICINE

## 2023-02-02 PROCEDURE — 3046F HEMOGLOBIN A1C LEVEL >9.0%: CPT | Performed by: FAMILY MEDICINE

## 2023-02-02 PROCEDURE — G8427 DOCREV CUR MEDS BY ELIG CLIN: HCPCS | Performed by: FAMILY MEDICINE

## 2023-02-02 PROCEDURE — G8484 FLU IMMUNIZE NO ADMIN: HCPCS | Performed by: FAMILY MEDICINE

## 2023-02-02 PROCEDURE — 99213 OFFICE O/P EST LOW 20 MIN: CPT | Performed by: FAMILY MEDICINE

## 2023-02-02 PROCEDURE — 2022F DILAT RTA XM EVC RTNOPTHY: CPT | Performed by: FAMILY MEDICINE

## 2023-02-02 PROCEDURE — 1036F TOBACCO NON-USER: CPT | Performed by: FAMILY MEDICINE

## 2023-02-02 PROCEDURE — G8417 CALC BMI ABV UP PARAM F/U: HCPCS | Performed by: FAMILY MEDICINE

## 2023-02-02 PROCEDURE — 3017F COLORECTAL CA SCREEN DOC REV: CPT | Performed by: FAMILY MEDICINE

## 2023-02-02 PROCEDURE — 82947 ASSAY GLUCOSE BLOOD QUANT: CPT | Performed by: FAMILY MEDICINE

## 2023-02-02 PROCEDURE — 1123F ACP DISCUSS/DSCN MKR DOCD: CPT | Performed by: FAMILY MEDICINE

## 2023-02-02 RX ORDER — AMLODIPINE BESYLATE AND BENAZEPRIL HYDROCHLORIDE 5; 20 MG/1; MG/1
1 CAPSULE ORAL DAILY
Qty: 90 CAPSULE | Refills: 3 | Status: SHIPPED | OUTPATIENT
Start: 2023-02-02

## 2023-02-02 SDOH — ECONOMIC STABILITY: HOUSING INSECURITY
IN THE LAST 12 MONTHS, WAS THERE A TIME WHEN YOU DID NOT HAVE A STEADY PLACE TO SLEEP OR SLEPT IN A SHELTER (INCLUDING NOW)?: NO

## 2023-02-02 SDOH — ECONOMIC STABILITY: FOOD INSECURITY: WITHIN THE PAST 12 MONTHS, YOU WORRIED THAT YOUR FOOD WOULD RUN OUT BEFORE YOU GOT MONEY TO BUY MORE.: NEVER TRUE

## 2023-02-02 SDOH — ECONOMIC STABILITY: FOOD INSECURITY: WITHIN THE PAST 12 MONTHS, THE FOOD YOU BOUGHT JUST DIDN'T LAST AND YOU DIDN'T HAVE MONEY TO GET MORE.: NEVER TRUE

## 2023-02-02 SDOH — ECONOMIC STABILITY: INCOME INSECURITY: HOW HARD IS IT FOR YOU TO PAY FOR THE VERY BASICS LIKE FOOD, HOUSING, MEDICAL CARE, AND HEATING?: NOT VERY HARD

## 2023-02-02 ASSESSMENT — PATIENT HEALTH QUESTIONNAIRE - PHQ9
SUM OF ALL RESPONSES TO PHQ9 QUESTIONS 1 & 2: 0
SUM OF ALL RESPONSES TO PHQ QUESTIONS 1-9: 0
2. FEELING DOWN, DEPRESSED OR HOPELESS: 0
SUM OF ALL RESPONSES TO PHQ QUESTIONS 1-9: 0
1. LITTLE INTEREST OR PLEASURE IN DOING THINGS: 0

## 2023-02-02 ASSESSMENT — ENCOUNTER SYMPTOMS
EYES NEGATIVE: 1
GASTROINTESTINAL NEGATIVE: 1
RESPIRATORY NEGATIVE: 1

## 2023-02-02 NOTE — PROGRESS NOTES
HISTORY OF PRESENT ILLNESS    Dre Camp is a 71 y.o. male. HPI  Chief Complaint   Patient presents with    Medication Refill     See above. Feeling well. Elevated glucose is controlled with diet and exercise. No side effects from BP medication. No headache or vision change. Denies chest pain or SOB. No swelling. Home BP readings are normal.    No current outpatient medications on file prior to visit. No current facility-administered medications on file prior to visit. Past Medical History:   Diagnosis Date    Basal cell carcinoma 6/2014    Erectile dysfunction     Hypertension     Posterior tibial tendon dysfunction (PTTD) of left lower extremity 2/9/2022    Pressure injury of left foot, stage 3 ([facility] [address] 2/9/2022       Review of Systems   Constitutional: Negative. Eyes: Negative. Respiratory: Negative. Cardiovascular: Negative. Gastrointestinal: Negative. Genitourinary: Negative. Musculoskeletal: Negative. Neurological: Negative. Psychiatric/Behavioral: Negative. Blood pressure 138/84, pulse 80, temperature 97.8 °F (36.6 °C), temperature source Temporal, weight 183 lb (83 kg), SpO2 99 %. Physical Exam  Vitals and nursing note reviewed. Constitutional:       Appearance: Normal appearance. HENT:      Mouth/Throat:      Mouth: Mucous membranes are moist.      Pharynx: Oropharynx is clear. Eyes:      Conjunctiva/sclera: Conjunctivae normal.   Neck:      Vascular: No carotid bruit. Cardiovascular:      Rate and Rhythm: Normal rate and regular rhythm. Heart sounds: Normal heart sounds. Pulmonary:      Breath sounds: Normal breath sounds. Musculoskeletal:         General: No swelling. Cervical back: Neck supple. Lymphadenopathy:      Cervical: No cervical adenopathy. Psychiatric:         Mood and Affect: Mood normal.        ASSESSMENT and PLAN    Rohan was seen today for medication refill.     Diagnoses and all orders for this visit:    D[facility]  -     AMB POC HEMOGLOBIN A1C  -     AMB POC GLUCOSE, QUANTITATIVE, BLOOD    Primary hypertension    Other orders  -     amLODIPine-benazepril (LOTREL) 5-20 MG per capsule; Take 1 capsule by mouth daily   Note initial BP elevation decreased on recheck by me  Notified A1c is normal.  Discussed history and medications with patient. Continue current measures. Follow up if side effects occur or if new symptoms develop. Return in about 6 months (around 8/2/2023), or if symptoms worsen or fail to improve.     Jamie Calderon MD

## 2023-09-24 ENCOUNTER — APPOINTMENT (OUTPATIENT)
Dept: GENERAL RADIOLOGY | Age: 73
DRG: 853 | End: 2023-09-24
Payer: COMMERCIAL

## 2023-09-24 ENCOUNTER — HOSPITAL ENCOUNTER (INPATIENT)
Age: 73
LOS: 2 days | Discharge: ANOTHER HOSPITAL | DRG: 853 | End: 2023-09-26
Attending: EMERGENCY MEDICINE | Admitting: FAMILY MEDICINE
Payer: COMMERCIAL

## 2023-09-24 DIAGNOSIS — M86.171 OTHER ACUTE OSTEOMYELITIS OF RIGHT FOOT (HCC): Primary | ICD-10-CM

## 2023-09-24 PROBLEM — E11.69 DIABETIC FOOT ULCER WITH OSTEOMYELITIS (HCC): Status: ACTIVE | Noted: 2023-09-24

## 2023-09-24 PROBLEM — L97.509 DIABETIC FOOT ULCER WITH OSTEOMYELITIS (HCC): Status: ACTIVE | Noted: 2023-09-24

## 2023-09-24 PROBLEM — E11.621 DIABETIC FOOT ULCER WITH OSTEOMYELITIS (HCC): Status: ACTIVE | Noted: 2023-09-24

## 2023-09-24 PROBLEM — M86.9 DIABETIC FOOT ULCER WITH OSTEOMYELITIS (HCC): Status: ACTIVE | Noted: 2023-09-24

## 2023-09-24 PROBLEM — E11.9 TYPE 2 DIABETES MELLITUS (HCC): Chronic | Status: ACTIVE | Noted: 2022-02-21

## 2023-09-24 PROBLEM — E11.65 TYPE 2 DIABETES MELLITUS WITH HYPERGLYCEMIA, WITHOUT LONG-TERM CURRENT USE OF INSULIN (HCC): Chronic | Status: ACTIVE | Noted: 2022-02-21

## 2023-09-24 PROBLEM — N17.9 STAGE 1 ACUTE KIDNEY INJURY (HCC): Status: ACTIVE | Noted: 2023-09-24

## 2023-09-24 LAB
ALBUMIN SERPL-MCNC: 3.3 G/DL (ref 3.2–4.6)
ALBUMIN/GLOB SERPL: 0.6 (ref 0.4–1.6)
ALP SERPL-CCNC: 98 U/L (ref 50–136)
ALT SERPL-CCNC: 16 U/L (ref 12–65)
ANION GAP SERPL CALC-SCNC: 11 MMOL/L (ref 2–11)
AST SERPL-CCNC: 21 U/L (ref 15–37)
BASOPHILS # BLD: 0.1 K/UL (ref 0–0.2)
BASOPHILS NFR BLD: 0 % (ref 0–2)
BILIRUB SERPL-MCNC: 0.7 MG/DL (ref 0.2–1.1)
BUN SERPL-MCNC: 25 MG/DL (ref 8–23)
CALCIUM SERPL-MCNC: 9.8 MG/DL (ref 8.3–10.4)
CHLORIDE SERPL-SCNC: 98 MMOL/L (ref 101–110)
CO2 SERPL-SCNC: 25 MMOL/L (ref 21–32)
CREAT SERPL-MCNC: 1.37 MG/DL (ref 0.8–1.5)
CRP SERPL-MCNC: 21.1 MG/DL (ref 0–0.9)
DIFFERENTIAL METHOD BLD: ABNORMAL
EOSINOPHIL # BLD: 0.1 K/UL (ref 0–0.8)
EOSINOPHIL NFR BLD: 0 % (ref 0.5–7.8)
ERYTHROCYTE [DISTWIDTH] IN BLOOD BY AUTOMATED COUNT: 12.7 % (ref 11.9–14.6)
ERYTHROCYTE [SEDIMENTATION RATE] IN BLOOD: 40 MM/HR (ref 0–15)
GLOBULIN SER CALC-MCNC: 5.6 G/DL (ref 2.8–4.5)
GLUCOSE SERPL-MCNC: 157 MG/DL (ref 65–100)
HCT VFR BLD AUTO: 38.1 % (ref 41.1–50.3)
HGB BLD-MCNC: 12.2 G/DL (ref 13.6–17.2)
IMM GRANULOCYTES # BLD AUTO: 0.1 K/UL (ref 0–0.5)
IMM GRANULOCYTES NFR BLD AUTO: 1 % (ref 0–5)
LACTATE SERPL-SCNC: 1.4 MMOL/L (ref 0.4–2)
LYMPHOCYTES # BLD: 1.5 K/UL (ref 0.5–4.6)
LYMPHOCYTES NFR BLD: 8 % (ref 13–44)
MCH RBC QN AUTO: 27.2 PG (ref 26.1–32.9)
MCHC RBC AUTO-ENTMCNC: 32 G/DL (ref 31.4–35)
MCV RBC AUTO: 85 FL (ref 82–102)
MONOCYTES # BLD: 1.2 K/UL (ref 0.1–1.3)
MONOCYTES NFR BLD: 6 % (ref 4–12)
NEUTS SEG # BLD: 15.6 K/UL (ref 1.7–8.2)
NEUTS SEG NFR BLD: 84 % (ref 43–78)
NRBC # BLD: 0 K/UL (ref 0–0.2)
PLATELET # BLD AUTO: 386 K/UL (ref 150–450)
PMV BLD AUTO: 10 FL (ref 9.4–12.3)
POTASSIUM SERPL-SCNC: 4.3 MMOL/L (ref 3.5–5.1)
PROCALCITONIN SERPL-MCNC: 0.27 NG/ML (ref 0–0.49)
PROT SERPL-MCNC: 8.9 G/DL (ref 6.3–8.2)
RBC # BLD AUTO: 4.48 M/UL (ref 4.23–5.6)
SODIUM SERPL-SCNC: 134 MMOL/L (ref 133–143)
WBC # BLD AUTO: 18.5 K/UL (ref 4.3–11.1)

## 2023-09-24 PROCEDURE — 85025 COMPLETE CBC W/AUTO DIFF WBC: CPT

## 2023-09-24 PROCEDURE — 6360000002 HC RX W HCPCS: Performed by: PHYSICIAN ASSISTANT

## 2023-09-24 PROCEDURE — 96367 TX/PROPH/DG ADDL SEQ IV INF: CPT

## 2023-09-24 PROCEDURE — 2580000003 HC RX 258: Performed by: PHYSICIAN ASSISTANT

## 2023-09-24 PROCEDURE — 80053 COMPREHEN METABOLIC PANEL: CPT

## 2023-09-24 PROCEDURE — 1100000000 HC RM PRIVATE

## 2023-09-24 PROCEDURE — 6360000002 HC RX W HCPCS: Performed by: FAMILY MEDICINE

## 2023-09-24 PROCEDURE — 87040 BLOOD CULTURE FOR BACTERIA: CPT

## 2023-09-24 PROCEDURE — 87150 DNA/RNA AMPLIFIED PROBE: CPT

## 2023-09-24 PROCEDURE — 87077 CULTURE AEROBIC IDENTIFY: CPT

## 2023-09-24 PROCEDURE — 87075 CULTR BACTERIA EXCEPT BLOOD: CPT

## 2023-09-24 PROCEDURE — 84145 PROCALCITONIN (PCT): CPT

## 2023-09-24 PROCEDURE — 86140 C-REACTIVE PROTEIN: CPT

## 2023-09-24 PROCEDURE — 96365 THER/PROPH/DIAG IV INF INIT: CPT

## 2023-09-24 PROCEDURE — 85652 RBC SED RATE AUTOMATED: CPT

## 2023-09-24 PROCEDURE — 99285 EMERGENCY DEPT VISIT HI MDM: CPT

## 2023-09-24 PROCEDURE — 87186 SC STD MICRODIL/AGAR DIL: CPT

## 2023-09-24 PROCEDURE — 87205 SMEAR GRAM STAIN: CPT

## 2023-09-24 PROCEDURE — 83605 ASSAY OF LACTIC ACID: CPT

## 2023-09-24 PROCEDURE — 87070 CULTURE OTHR SPECIMN AEROBIC: CPT

## 2023-09-24 PROCEDURE — 2580000003 HC RX 258: Performed by: FAMILY MEDICINE

## 2023-09-24 PROCEDURE — 73630 X-RAY EXAM OF FOOT: CPT

## 2023-09-24 RX ORDER — POTASSIUM CHLORIDE 20 MEQ/1
40 TABLET, EXTENDED RELEASE ORAL PRN
Status: DISCONTINUED | OUTPATIENT
Start: 2023-09-24 | End: 2023-09-26 | Stop reason: HOSPADM

## 2023-09-24 RX ORDER — SODIUM CHLORIDE, SODIUM LACTATE, POTASSIUM CHLORIDE, AND CALCIUM CHLORIDE .6; .31; .03; .02 G/100ML; G/100ML; G/100ML; G/100ML
1000 INJECTION, SOLUTION INTRAVENOUS ONCE
Status: COMPLETED | OUTPATIENT
Start: 2023-09-25 | End: 2023-09-25

## 2023-09-24 RX ORDER — ACETAMINOPHEN 325 MG/1
650 TABLET ORAL EVERY 6 HOURS PRN
Status: DISCONTINUED | OUTPATIENT
Start: 2023-09-24 | End: 2023-09-26 | Stop reason: HOSPADM

## 2023-09-24 RX ORDER — POTASSIUM CHLORIDE 7.45 MG/ML
10 INJECTION INTRAVENOUS PRN
Status: DISCONTINUED | OUTPATIENT
Start: 2023-09-24 | End: 2023-09-26 | Stop reason: HOSPADM

## 2023-09-24 RX ORDER — SODIUM CHLORIDE 0.9 % (FLUSH) 0.9 %
5-40 SYRINGE (ML) INJECTION PRN
Status: DISCONTINUED | OUTPATIENT
Start: 2023-09-24 | End: 2023-09-26 | Stop reason: HOSPADM

## 2023-09-24 RX ORDER — ACETAMINOPHEN 650 MG/1
650 SUPPOSITORY RECTAL EVERY 6 HOURS PRN
Status: DISCONTINUED | OUTPATIENT
Start: 2023-09-24 | End: 2023-09-26 | Stop reason: HOSPADM

## 2023-09-24 RX ORDER — ENOXAPARIN SODIUM 100 MG/ML
40 INJECTION SUBCUTANEOUS EVERY 24 HOURS
Status: DISCONTINUED | OUTPATIENT
Start: 2023-09-24 | End: 2023-09-26 | Stop reason: HOSPADM

## 2023-09-24 RX ORDER — MAGNESIUM SULFATE IN WATER 40 MG/ML
2000 INJECTION, SOLUTION INTRAVENOUS PRN
Status: DISCONTINUED | OUTPATIENT
Start: 2023-09-24 | End: 2023-09-26 | Stop reason: HOSPADM

## 2023-09-24 RX ORDER — SODIUM CHLORIDE, SODIUM LACTATE, POTASSIUM CHLORIDE, AND CALCIUM CHLORIDE .6; .31; .03; .02 G/100ML; G/100ML; G/100ML; G/100ML
1000 INJECTION, SOLUTION INTRAVENOUS ONCE
Status: COMPLETED | OUTPATIENT
Start: 2023-09-24 | End: 2023-09-24

## 2023-09-24 RX ORDER — SODIUM CHLORIDE 9 MG/ML
INJECTION, SOLUTION INTRAVENOUS PRN
Status: DISCONTINUED | OUTPATIENT
Start: 2023-09-24 | End: 2023-09-26 | Stop reason: HOSPADM

## 2023-09-24 RX ORDER — SODIUM CHLORIDE 0.9 % (FLUSH) 0.9 %
5-40 SYRINGE (ML) INJECTION EVERY 12 HOURS SCHEDULED
Status: DISCONTINUED | OUTPATIENT
Start: 2023-09-24 | End: 2023-09-26 | Stop reason: HOSPADM

## 2023-09-24 RX ORDER — AMLODIPINE BESYLATE AND BENAZEPRIL HYDROCHLORIDE 5; 20 MG/1; MG/1
1 CAPSULE ORAL DAILY
Status: DISCONTINUED | OUTPATIENT
Start: 2023-09-24 | End: 2023-09-26

## 2023-09-24 RX ADMIN — SODIUM CHLORIDE, POTASSIUM CHLORIDE, SODIUM LACTATE AND CALCIUM CHLORIDE 1000 ML: 600; 310; 30; 20 INJECTION, SOLUTION INTRAVENOUS at 14:59

## 2023-09-24 RX ADMIN — SODIUM CHLORIDE, PRESERVATIVE FREE 10 ML: 5 INJECTION INTRAVENOUS at 20:33

## 2023-09-24 RX ADMIN — VANCOMYCIN HYDROCHLORIDE 2000 MG: 10 INJECTION, POWDER, LYOPHILIZED, FOR SOLUTION INTRAVENOUS at 15:29

## 2023-09-24 RX ADMIN — PIPERACILLIN AND TAZOBACTAM 4500 MG: 4; .5 INJECTION, POWDER, LYOPHILIZED, FOR SOLUTION INTRAVENOUS at 14:58

## 2023-09-24 RX ADMIN — ENOXAPARIN SODIUM 40 MG: 100 INJECTION SUBCUTANEOUS at 20:33

## 2023-09-24 RX ADMIN — CEFEPIME 2000 MG: 2 INJECTION, POWDER, FOR SOLUTION INTRAVENOUS at 20:35

## 2023-09-24 ASSESSMENT — ENCOUNTER SYMPTOMS
SHORTNESS OF BREATH: 0
ABDOMINAL PAIN: 0

## 2023-09-24 ASSESSMENT — PAIN - FUNCTIONAL ASSESSMENT: PAIN_FUNCTIONAL_ASSESSMENT: 0-10

## 2023-09-24 ASSESSMENT — PAIN SCALES - GENERAL: PAINLEVEL_OUTOF10: 0

## 2023-09-24 NOTE — PROGRESS NOTES
Received from ER   with right second toe inflamed and very edematous . Pulses noted. Alert and oriented times 4. Voiding without difficulty. Abdomen with bowel sounds in all 4 quadrants. Denies any pain or discomfort. Saline well intact to left arm.

## 2023-09-24 NOTE — PROGRESS NOTES
TRANSFER - IN REPORT:    Verbal report received from Veronica Alcantara RN on Yudy Morton  being received from ER for routine progression of patient care      Report consisted of patient's Situation, Background, Assessment and   Recommendations(SBAR). Information from the following report(s) ED SBAR was reviewed with the receiving nurse. Opportunity for questions and clarification was provided. Assessment completed upon patient's arrival to unit and care assumed.   Mariano Alfonso

## 2023-09-24 NOTE — ED PROVIDER NOTES
Emergency Department Provider Note       PCP: Marissa Patricio MD   Age: 68 y.o. Sex: male     DISPOSITION Decision To Admit 09/24/2023 04:44:04 PM       ICD-10-CM    1. Other acute osteomyelitis of right foot [facility])  M86.171           Medical Decision Making     Complexity of Problems Addressed:  1 or more acute illnesses that pose a threat to life or bodily function. Data Reviewed and Analyzed:   I independently ordered and reviewed each unique test.  I reviewed external records: provider visit note from PCP. I interpreted the X-rays osteomyelitis right distal phalanx second toe. Discussion of management or test interpretation. 71-year-old male who presented to the emergency department for a chronic wound to the right second toe. History of diet controlled DM. No fevers at home, vital signs stable. Chronic appearing wound with purulent discharge noted, redness and swelling noted to the right foot. Elevated white blood cell count at 18, CRP and sed rate are also elevated. Normal lactic acid and procalcitonin levels. Patient was given IV vancomycin and Zosyn here in the emergency department. X-ray shows osteomyelitis likely in the distal phalanx of the right second toe. I spoke with orthopedics who request hospitalist admission for IV antibiotics and MRI with and without contrast.  I discussed the plan with the patient who is in agreement. Discussed patient with my attending, Dr. Erin Jha who also spoke with patient. The patient was admitted and I have discussed patient management with the admitting provider. The management of this patient was discussed with an external consultant. Risk of Complications and/or Morbidity of Patient Management:  Shared medical decision making was utilized in creating the patients health plan today.     ED Course as of 09/24/23 1646   Sun Sep 24, 2023   1515 Sed Rate, Automated(!): 40 [KE]   1516 WBC(!): 18.5 [KE]   1530 XR FOOT RIGHT (MIN 3 VIEWS)

## 2023-09-24 NOTE — ED TRIAGE NOTES
Pt states he had irritation to toe on right foot a few days ago after removing a callous. Pt states he thought it was gout but it has continued to get worse and more swollen/red. Pt was seen at urgent care and sent here for a potential ulcer to his foot. Pt has swelling to his right ankle as well.

## 2023-09-24 NOTE — ED NOTES
TRANSFER - OUT REPORT:    Verbal report given to [facility] RN on Alonzo Major  being transferred to [facility] [phone] for routine progression of patient care       Report consisted of patient's Situation, Background, Assessment and   Recommendations(SBAR). Information from the following report(s) ED SBAR was reviewed with the receiving nurse. Lines:   Peripheral IV 09/24/23 Left Antecubital (Active)   Site Assessment Clean, dry & intact 09/24/23 1455   Line Status Flushed;Brisk blood return 09/24/23 1455   Phlebitis Assessment No symptoms 09/24/23 1455   Infiltration Assessment 0 09/24/23 1455        Opportunity for questions and clarification was provided.       Patient transported with:  Registered Nurse      Angelina Sheth RN  09/24/23 9435

## 2023-09-24 NOTE — H&P
Hospitalist History and Physical   Admit Date:  2023  2:20 PM   Name:  Jamila Rodriguez   Age:  68 y.o. Sex:  male  :  1950   MRN:  [id number]   Room:  [address] [place] [address]    Presenting/Chief Complaint: Skin Problem     Reason(s) for Admission: Diabetic foot ulcer with osteomyelitis ([address] ) [S35.952, E11.69, L97.509, M86.9]     History of Present Illness:   Jamila Rodriguez is a 68 y.o. male with medical history of diet-controlled diabetes, hypertension who presented with right second toe wound. He was not on any medicine for his diabetes because he had lost a lot of weight and reduced his sugar intake getting his A1c down to 6.2 at last check. He had been clipping his toenails and had accidentally injured the skin. He had kept it covered with antibiotic ointment on it with the wound progressed and became worse. He has had flatfeet since birth. He has good sensation in his feet but did not notice the wound becoming painful until it advanced to his MTP. Wound cultures were collected in the emergency department where he was noted to have WBC of 18.5 with elevated CRP and serum glucose 157. He was also noted to have an increase of his creatinine. He was admitted for further evaluation management. Assessment & Plan:   Diabetic foot ulcer with osteomyelitis  -Follow cultures  -Cefepime, vancomycin  -MRI foot  -Consult orthopedic surgery    Type 2 diabetes mellitus with hyperglycemia, without long-term current use of insulin  Previously diet controlled  -A1c  -Monitor closely    Stage 1 acute kidney injury   -Resuscitate with LR bolus    Primary hypertension  -Hold home amlodipine-benazepril      PT/OT evals and PPD needed/ordered?   Yes  Diet: No diet orders on file  VTE prophylaxis:  Enoxaparin  Code status: No Order      Non-peripheral Lines and Tubes (if present):             Hospital Problems:  Principal Problem:    Diabetic foot ulcer with osteomyelitis ([address])  Active Problems:    Type 2 diabetes mmol/L    Glucose 157 (H) 65 - 100 mg/dL    BUN 25 (H) 8 - 23 MG/DL    Creatinine 1.37 0.8 - 1.5 MG/DL    Est, Glom Filt Rate 55 (L) >60 ml/min/1.73m2    Calcium 9.8 8.3 - 10.4 MG/DL    Total Bilirubin 0.7 0.2 - 1.1 MG/DL    ALT 16 12 - 65 U/L    AST 21 15 - 37 U/L    Alk Phosphatase 98 50 - 136 U/L    Total Protein 8.9 (H) 6.3 - 8.2 g/dL    Albumin 3.3 3.2 - 4.6 g/dL    Globulin 5.6 (H) 2.8 - 4.5 g/dL    Albumin/Globulin Ratio 0.6 0.4 - 1.6     CBC with Auto Differential    Collection Time: 09/24/23  2:55 PM   Result Value Ref Range    WBC 18.5 (H) 4.3 - 11.1 K/uL    RBC 4.48 4.23 - 5.6 M/uL    Hemoglobin 12.2 (L) 13.6 - 17.2 g/dL    Hematocrit 38.1 (L) 41.1 - 50.3 %    MCV 85.0 82.0 - 102.0 FL    MCH 27.2 26.1 - 32.9 PG    MCHC 32.0 31.4 - 35.0 g/dL    RDW 12.7 11.9 - 14.6 %    Platelets 161 355 - 916 K/uL    MPV 10.0 9.4 - 12.3 FL    nRBC 0.00 0.0 - 0.2 K/uL    Differential Type AUTOMATED      Neutrophils % 84 (H) 43 - 78 %    Lymphocytes % 8 (L) 13 - 44 %    Monocytes % 6 4.0 - 12.0 %    Eosinophils % 0 (L) 0.5 - 7.8 %    Basophils % 0 0.0 - 2.0 %    Immature Granulocytes 1 0.0 - 5.0 %    Neutrophils Absolute 15.6 (H) 1.7 - 8.2 K/UL    Lymphocytes Absolute 1.5 0.5 - 4.6 K/UL    Monocytes Absolute 1.2 0.1 - 1.3 K/UL    Eosinophils Absolute 0.1 0.0 - 0.8 K/UL    Basophils Absolute 0.1 0.0 - 0.2 K/UL    Absolute Immature Granulocyte 0.1 0.0 - 0.5 K/UL   Lactate, Sepsis    Collection Time: 09/24/23  2:55 PM   Result Value Ref Range    Lactic Acid, Sepsis 1.4 0.4 - 2.0 MMOL/L   Procalcitonin    Collection Time: 09/24/23  2:55 PM   Result Value Ref Range    Procalcitonin 0.27 0.00 - 0.49 ng/mL   C-Reactive Protein    Collection Time: 09/24/23  2:55 PM   Result Value Ref Range    CRP 21.1 (H) 0.0 - 0.9 mg/dL   Sedimentation Rate    Collection Time: 09/24/23  2:55 PM   Result Value Ref Range    Sed Rate, Automated 40 (H) 0 - 15 mm/hr       I have personally reviewed imaging studies:  XR FOOT RIGHT (MIN 3

## 2023-09-24 NOTE — FLOWSHEET NOTE
Right second toe  with opening at distal end of toe. Red with +3 edema up to middle calf. Pulses noted.

## 2023-09-25 ENCOUNTER — APPOINTMENT (OUTPATIENT)
Dept: MRI IMAGING | Age: 73
DRG: 853 | End: 2023-09-25
Payer: COMMERCIAL

## 2023-09-25 ENCOUNTER — APPOINTMENT (OUTPATIENT)
Dept: ULTRASOUND IMAGING | Age: 73
DRG: 853 | End: 2023-09-25
Payer: COMMERCIAL

## 2023-09-25 LAB
ACCESSION NUMBER, LLC1M: ABNORMAL
ACINETOBACTER CALCOAC BAUMANNII COMPLEX BY PCR: NOT DETECTED
ALBUMIN SERPL-MCNC: 2.5 G/DL (ref 3.2–4.6)
ANION GAP SERPL CALC-SCNC: 9 MMOL/L (ref 2–11)
B FRAGILIS DNA BLD POS QL NAA+NON-PROBE: NOT DETECTED
BIOFIRE TEST COMMENT: ABNORMAL
BUN SERPL-MCNC: 25 MG/DL (ref 8–23)
C ALBICANS DNA BLD POS QL NAA+NON-PROBE: NOT DETECTED
C AURIS DNA BLD POS QL NAA+NON-PROBE: NOT DETECTED
C GATTII+NEOFOR DNA BLD POS QL NAA+N-PRB: NOT DETECTED
C GLABRATA DNA BLD POS QL NAA+NON-PROBE: NOT DETECTED
C KRUSEI DNA BLD POS QL NAA+NON-PROBE: NOT DETECTED
C PARAP DNA BLD POS QL NAA+NON-PROBE: NOT DETECTED
C TROPICLS DNA BLD POS QL NAA+NON-PROBE: NOT DETECTED
CALCIUM SERPL-MCNC: 8.7 MG/DL (ref 8.3–10.4)
CHLORIDE SERPL-SCNC: 105 MMOL/L (ref 101–110)
CHOLEST SERPL-MCNC: 116 MG/DL
CO2 SERPL-SCNC: 24 MMOL/L (ref 21–32)
CREAT SERPL-MCNC: 1.21 MG/DL (ref 0.8–1.5)
E CLOAC COMP DNA BLD POS NAA+NON-PROBE: NOT DETECTED
E COLI DNA BLD POS QL NAA+NON-PROBE: NOT DETECTED
E FAECALIS DNA BLD POS QL NAA+NON-PROBE: NOT DETECTED
E FAECIUM DNA BLD POS QL NAA+NON-PROBE: NOT DETECTED
ENTEROBACTERALES DNA BLD POS NAA+N-PRB: NOT DETECTED
ERYTHROCYTE [DISTWIDTH] IN BLOOD BY AUTOMATED COUNT: 13 % (ref 11.9–14.6)
EST. AVERAGE GLUCOSE BLD GHB EST-MCNC: 160 MG/DL
GLUCOSE SERPL-MCNC: 145 MG/DL (ref 65–100)
GP B STREP DNA BLD POS QL NAA+NON-PROBE: NOT DETECTED
HAEM INFLU DNA BLD POS QL NAA+NON-PROBE: NOT DETECTED
HBA1C MFR BLD: 7.2 % (ref 4.8–5.6)
HCT VFR BLD AUTO: 32.3 % (ref 41.1–50.3)
HDLC SERPL-MCNC: 31 MG/DL (ref 40–60)
HDLC SERPL: 3.7
HGB BLD-MCNC: 10.8 G/DL (ref 13.6–17.2)
K OXYTOCA DNA BLD POS QL NAA+NON-PROBE: NOT DETECTED
KLEBSIELLA SP DNA BLD POS QL NAA+NON-PRB: NOT DETECTED
KLEBSIELLA SP DNA BLD POS QL NAA+NON-PRB: NOT DETECTED
L MONOCYTOG DNA BLD POS QL NAA+NON-PROBE: NOT DETECTED
LDLC SERPL CALC-MCNC: 63.2 MG/DL
MCH RBC QN AUTO: 28.1 PG (ref 26.1–32.9)
MCHC RBC AUTO-ENTMCNC: 33.4 G/DL (ref 31.4–35)
MCV RBC AUTO: 84.1 FL (ref 82–102)
MECA+MECC+MREJ ISLT/SPM QL: DETECTED
N MEN DNA BLD POS QL NAA+NON-PROBE: NOT DETECTED
NRBC # BLD: 0 K/UL (ref 0–0.2)
P AERUGINOSA DNA BLD POS NAA+NON-PROBE: NOT DETECTED
PHOSPHATE SERPL-MCNC: 4.6 MG/DL (ref 2.3–3.7)
PLATELET # BLD AUTO: 384 K/UL (ref 150–450)
PMV BLD AUTO: 10.1 FL (ref 9.4–12.3)
POTASSIUM SERPL-SCNC: 4.3 MMOL/L (ref 3.5–5.1)
PROCALCITONIN SERPL-MCNC: 0.45 NG/ML (ref 0–0.49)
PROTEUS SP DNA BLD POS QL NAA+NON-PROBE: NOT DETECTED
RBC # BLD AUTO: 3.84 M/UL (ref 4.23–5.6)
RESISTANT GENE TARGETS: ABNORMAL
S AUREUS DNA BLD POS QL NAA+NON-PROBE: DETECTED
S AUREUS+CONS DNA BLD POS NAA+NON-PROBE: DETECTED
S EPIDERMIDIS DNA BLD POS QL NAA+NON-PRB: NOT DETECTED
S LUGDUNENSIS DNA BLD POS QL NAA+NON-PRB: NOT DETECTED
S MALTOPHILIA DNA BLD POS QL NAA+NON-PRB: NOT DETECTED
S MARCESCENS DNA BLD POS NAA+NON-PROBE: NOT DETECTED
S PNEUM DNA BLD POS QL NAA+NON-PROBE: NOT DETECTED
S PYO DNA BLD POS QL NAA+NON-PROBE: NOT DETECTED
SALMONELLA DNA BLD POS QL NAA+NON-PROBE: NOT DETECTED
SODIUM SERPL-SCNC: 138 MMOL/L (ref 133–143)
STREPTOCOCCUS DNA BLD POS NAA+NON-PROBE: NOT DETECTED
TRIGL SERPL-MCNC: 109 MG/DL (ref 35–150)
TSH W FREE THYROID IF ABNORMAL: 0.47 UIU/ML (ref 0.36–3.74)
VLDLC SERPL CALC-MCNC: 21.8 MG/DL (ref 6–23)
WBC # BLD AUTO: 13.7 K/UL (ref 4.3–11.1)

## 2023-09-25 PROCEDURE — 80048 BASIC METABOLIC PNL TOTAL CA: CPT

## 2023-09-25 PROCEDURE — 93922 UPR/L XTREMITY ART 2 LEVELS: CPT

## 2023-09-25 PROCEDURE — 36415 COLL VENOUS BLD VENIPUNCTURE: CPT

## 2023-09-25 PROCEDURE — 84100 ASSAY OF PHOSPHORUS: CPT

## 2023-09-25 PROCEDURE — 85027 COMPLETE CBC AUTOMATED: CPT

## 2023-09-25 PROCEDURE — 84443 ASSAY THYROID STIM HORMONE: CPT

## 2023-09-25 PROCEDURE — 73718 MRI LOWER EXTREMITY W/O DYE: CPT

## 2023-09-25 PROCEDURE — 1100000000 HC RM PRIVATE

## 2023-09-25 PROCEDURE — 2580000003 HC RX 258: Performed by: FAMILY MEDICINE

## 2023-09-25 PROCEDURE — 82040 ASSAY OF SERUM ALBUMIN: CPT

## 2023-09-25 PROCEDURE — 83036 HEMOGLOBIN GLYCOSYLATED A1C: CPT

## 2023-09-25 PROCEDURE — 6360000002 HC RX W HCPCS: Performed by: FAMILY MEDICINE

## 2023-09-25 PROCEDURE — 80061 LIPID PANEL: CPT

## 2023-09-25 PROCEDURE — 84145 PROCALCITONIN (PCT): CPT

## 2023-09-25 RX ORDER — SODIUM CHLORIDE, SODIUM LACTATE, POTASSIUM CHLORIDE, AND CALCIUM CHLORIDE .6; .31; .03; .02 G/100ML; G/100ML; G/100ML; G/100ML
1000 INJECTION, SOLUTION INTRAVENOUS ONCE
Status: COMPLETED | OUTPATIENT
Start: 2023-09-26 | End: 2023-09-26

## 2023-09-25 RX ADMIN — SODIUM CHLORIDE, POTASSIUM CHLORIDE, SODIUM LACTATE AND CALCIUM CHLORIDE 1000 ML: 600; 310; 30; 20 INJECTION, SOLUTION INTRAVENOUS at 07:56

## 2023-09-25 RX ADMIN — VANCOMYCIN HYDROCHLORIDE 1250 MG: 10 INJECTION, POWDER, LYOPHILIZED, FOR SOLUTION INTRAVENOUS at 16:09

## 2023-09-25 RX ADMIN — ENOXAPARIN SODIUM 40 MG: 100 INJECTION SUBCUTANEOUS at 20:25

## 2023-09-25 RX ADMIN — CEFEPIME 2000 MG: 2 INJECTION, POWDER, FOR SOLUTION INTRAVENOUS at 20:19

## 2023-09-25 RX ADMIN — SODIUM CHLORIDE, PRESERVATIVE FREE 10 ML: 5 INJECTION INTRAVENOUS at 07:56

## 2023-09-25 RX ADMIN — SODIUM CHLORIDE, PRESERVATIVE FREE 10 ML: 5 INJECTION INTRAVENOUS at 20:25

## 2023-09-25 NOTE — PLAN OF CARE
Problem: Discharge Planning  Goal: Discharge to home or other facility with appropriate resources  Outcome: Progressing     Problem: Pain  Goal: Verbalizes/displays adequate comfort level or baseline comfort level  Outcome: Progressing     Problem: ABCDS Injury Assessment  Goal: Absence of physical injury  Outcome: Progressing
normal...

## 2023-09-25 NOTE — PROGRESS NOTES
Orthopedic update:       Chronic OM of the right second toe. Arterial doppler results reviewed and patient has arterial disease. In light of this will place a consult for vascular surgery and orthopedics will sign off.

## 2023-09-25 NOTE — SIGNIFICANT EVENT
Blood cultures growing gram positive cocci. Has been on empiric IV vanc and cefepime since admission.

## 2023-09-25 NOTE — CARE COORDINATION
Assessment attempted, patient off the floor. CM will attempt tomorrow.      Olayinka TOPETE, NINA Em Fly

## 2023-09-25 NOTE — PROGRESS NOTES
Consult Received:     Right second toe diabetic foot ulcer with erosive findings on x-rays concerning for chronic OM of the distal phalanx. I have ordered arterial dopplers w/ rosy to determine if this is best treated by orthopedics or vascular surgery. Will follow up after this results.

## 2023-09-25 NOTE — PROGRESS NOTES
- 32 mmol/L    Anion Gap 9 2 - 11 mmol/L    Glucose 145 (H) 65 - 100 mg/dL    BUN 25 (H) 8 - 23 MG/DL    Creatinine 1.21 0.8 - 1.5 MG/DL    Est, Glom Filt Rate >60 >60 ml/min/1.73m2    Calcium 8.7 8.3 - 10.4 MG/DL   CBC    Collection Time: 09/25/23  5:59 AM   Result Value Ref Range    WBC 13.7 (H) 4.3 - 11.1 K/uL    RBC 3.84 (L) 4.23 - 5.6 M/uL    Hemoglobin 10.8 (L) 13.6 - 17.2 g/dL    Hematocrit 32.3 (L) 41.1 - 50.3 %    MCV 84.1 82.0 - 102.0 FL    MCH 28.1 26.1 - 32.9 PG    MCHC 33.4 31.4 - 35.0 g/dL    RDW 13.0 11.9 - 14.6 %    Platelets 691 630 - 008 K/uL    MPV 10.1 9.4 - 12.3 FL    nRBC 0.00 0.0 - 0.2 K/uL   Hemoglobin A1C    Collection Time: 09/25/23  5:59 AM   Result Value Ref Range    Hemoglobin A1C 7.2 (H) 4.8 - 5.6 %    eAG 160 mg/dL   TSH with Reflex    Collection Time: 09/25/23  5:59 AM   Result Value Ref Range    TSH w Free Thyroid if Abnormal 0.47 0.358 - 3.740 UIU/ML   Lipid Panel    Collection Time: 09/25/23  5:59 AM   Result Value Ref Range    Cholesterol, Total 116 MG/DL    Triglycerides 109 35 - 150 MG/DL    HDL 31 (L) 40 - 60 MG/DL    LDL Calculated 63.2 <100 MG/DL    VLDL Cholesterol Calculated 21.8 6.0 - 23.0 MG/DL    Chol/HDL Ratio 3.7 <200     Procalcitonin    Collection Time: 09/25/23  5:59 AM   Result Value Ref Range    Procalcitonin 0.45 0.00 - 0.49 ng/mL       Current Meds:  Current Facility-Administered Medications   Medication Dose Route Frequency    [Held by provider] amLODIPine-benazepril (LOTREL) 5-20 MG per capsule 1 capsule  1 capsule Oral Daily    sodium chloride flush 0.9 % injection 5-40 mL  5-40 mL IntraVENous 2 times per day    sodium chloride flush 0.9 % injection 5-40 mL  5-40 mL IntraVENous PRN    0.9 % sodium chloride infusion   IntraVENous PRN    enoxaparin (LOVENOX) injection 40 mg  40 mg SubCUTAneous Q24H    acetaminophen (TYLENOL) tablet 650 mg  650 mg Oral Q6H PRN    Or    acetaminophen (TYLENOL) suppository 650 mg  650 mg Rectal Q6H PRN    potassium chloride (KLOR-CON M) extended release tablet 40 mEq  40 mEq Oral PRN    Or    potassium bicarb-citric acid (EFFER-K) effervescent tablet 40 mEq  40 mEq Oral PRN    Or    potassium chloride 10 mEq/100 mL IVPB (Peripheral Line)  10 mEq IntraVENous PRN    magnesium sulfate 2000 mg in 50 mL IVPB premix  2,000 mg IntraVENous PRN    sodium phosphate 10 mmol in sodium chloride 0.9 % 250 mL IVPB  10 mmol IntraVENous PRN    Or    sodium phosphate 15 mmol in sodium chloride 0.9 % 250 mL IVPB  15 mmol IntraVENous PRN    Or    sodium phosphate 20 mmol in sodium chloride 0.9 % 500 mL IVPB  20 mmol IntraVENous PRN    tuberculin injection 5 Units  5 Units IntraDERmal Once    lactated ringers bolus bolus 1,000 mL  1,000 mL IntraVENous Once    ceFEPIme (MAXIPIME) 2,000 mg in sodium chloride 0.9 % 100 mL IVPB (mini-bag)  2,000 mg IntraVENous Q24H    vancomycin (VANCOCIN) 1250 mg in sodium chloride 0.9% 250 mL IVPB  1,250 mg IntraVENous Q24H       Signed:  Bryan Heller MD

## 2023-09-26 ENCOUNTER — ANESTHESIA EVENT (OUTPATIENT)
Dept: SURGERY | Age: 73
End: 2023-09-26
Payer: COMMERCIAL

## 2023-09-26 ENCOUNTER — HOSPITAL ENCOUNTER (INPATIENT)
Age: 73
LOS: 9 days | Discharge: NURSING, REHAB OR LONG-TERM CARE | DRG: 853 | End: 2023-10-05
Attending: STUDENT IN AN ORGANIZED HEALTH CARE EDUCATION/TRAINING PROGRAM | Admitting: FAMILY MEDICINE
Payer: COMMERCIAL

## 2023-09-26 ENCOUNTER — PREP FOR PROCEDURE (OUTPATIENT)
Dept: VASCULAR SURGERY | Age: 73
End: 2023-09-26

## 2023-09-26 VITALS
RESPIRATION RATE: 16 BRPM | BODY MASS INDEX: 27.9 KG/M2 | HEIGHT: 68 IN | SYSTOLIC BLOOD PRESSURE: 117 MMHG | HEART RATE: 83 BPM | OXYGEN SATURATION: 94 % | DIASTOLIC BLOOD PRESSURE: 74 MMHG | TEMPERATURE: 99.3 F | WEIGHT: 184.06 LBS

## 2023-09-26 DIAGNOSIS — B95.8 BACTEREMIA DUE TO STAPHYLOCOCCUS: ICD-10-CM

## 2023-09-26 DIAGNOSIS — E11.69 DIABETIC FOOT ULCER WITH OSTEOMYELITIS (HCC): Primary | ICD-10-CM

## 2023-09-26 DIAGNOSIS — E11.621 DIABETIC FOOT ULCER WITH OSTEOMYELITIS (HCC): Primary | ICD-10-CM

## 2023-09-26 DIAGNOSIS — N18.6 ESRD (END STAGE RENAL DISEASE) (HCC): ICD-10-CM

## 2023-09-26 DIAGNOSIS — M86.9 DIABETIC FOOT ULCER WITH OSTEOMYELITIS (HCC): Primary | ICD-10-CM

## 2023-09-26 DIAGNOSIS — L97.509 DIABETIC FOOT ULCER WITH OSTEOMYELITIS (HCC): Primary | ICD-10-CM

## 2023-09-26 DIAGNOSIS — R78.81 BACTEREMIA DUE TO STAPHYLOCOCCUS: ICD-10-CM

## 2023-09-26 PROBLEM — A41.02 SEPSIS DUE TO METHICILLIN RESISTANT STAPHYLOCOCCUS AUREUS (MRSA) (HCC): Status: ACTIVE | Noted: 2023-09-26

## 2023-09-26 PROBLEM — E77.8 HYPOPROTEINEMIA (HCC): Status: ACTIVE | Noted: 2023-09-26

## 2023-09-26 LAB
ALBUMIN SERPL-MCNC: 2.3 G/DL (ref 3.2–4.6)
ANION GAP SERPL CALC-SCNC: 8 MMOL/L (ref 2–11)
BUN SERPL-MCNC: 22 MG/DL (ref 8–23)
CALCIUM SERPL-MCNC: 8.8 MG/DL (ref 8.3–10.4)
CHLORIDE SERPL-SCNC: 107 MMOL/L (ref 101–110)
CO2 SERPL-SCNC: 25 MMOL/L (ref 21–32)
CREAT SERPL-MCNC: 1.08 MG/DL (ref 0.8–1.5)
ERYTHROCYTE [DISTWIDTH] IN BLOOD BY AUTOMATED COUNT: 12.9 % (ref 11.9–14.6)
GLUCOSE SERPL-MCNC: 160 MG/DL (ref 65–100)
HCT VFR BLD AUTO: 32.1 % (ref 41.1–50.3)
HGB BLD-MCNC: 10.5 G/DL (ref 13.6–17.2)
LACTATE SERPL-SCNC: 1.9 MMOL/L (ref 0.4–2)
MCH RBC QN AUTO: 27.8 PG (ref 26.1–32.9)
MCHC RBC AUTO-ENTMCNC: 32.7 G/DL (ref 31.4–35)
MCV RBC AUTO: 84.9 FL (ref 82–102)
NRBC # BLD: 0 K/UL (ref 0–0.2)
PHOSPHATE SERPL-MCNC: 3.4 MG/DL (ref 2.3–3.7)
PLATELET # BLD AUTO: 382 K/UL (ref 150–450)
PMV BLD AUTO: 10.1 FL (ref 9.4–12.3)
POTASSIUM SERPL-SCNC: 4.2 MMOL/L (ref 3.5–5.1)
PROCALCITONIN SERPL-MCNC: 0.23 NG/ML (ref 0–0.49)
RBC # BLD AUTO: 3.78 M/UL (ref 4.23–5.6)
SODIUM SERPL-SCNC: 140 MMOL/L (ref 133–143)
VANCOMYCIN SERPL-MCNC: 11.5 UG/ML
WBC # BLD AUTO: 14.2 K/UL (ref 4.3–11.1)

## 2023-09-26 PROCEDURE — 6360000002 HC RX W HCPCS: Performed by: FAMILY MEDICINE

## 2023-09-26 PROCEDURE — 82040 ASSAY OF SERUM ALBUMIN: CPT

## 2023-09-26 PROCEDURE — 83605 ASSAY OF LACTIC ACID: CPT

## 2023-09-26 PROCEDURE — 80048 BASIC METABOLIC PNL TOTAL CA: CPT

## 2023-09-26 PROCEDURE — 84100 ASSAY OF PHOSPHORUS: CPT

## 2023-09-26 PROCEDURE — 2580000003 HC RX 258: Performed by: FAMILY MEDICINE

## 2023-09-26 PROCEDURE — 6370000000 HC RX 637 (ALT 250 FOR IP): Performed by: STUDENT IN AN ORGANIZED HEALTH CARE EDUCATION/TRAINING PROGRAM

## 2023-09-26 PROCEDURE — 84145 PROCALCITONIN (PCT): CPT

## 2023-09-26 PROCEDURE — 85027 COMPLETE CBC AUTOMATED: CPT

## 2023-09-26 PROCEDURE — 36415 COLL VENOUS BLD VENIPUNCTURE: CPT

## 2023-09-26 PROCEDURE — 1100000000 HC RM PRIVATE

## 2023-09-26 PROCEDURE — 87040 BLOOD CULTURE FOR BACTERIA: CPT

## 2023-09-26 PROCEDURE — 80202 ASSAY OF VANCOMYCIN: CPT

## 2023-09-26 RX ORDER — AMLODIPINE BESYLATE AND BENAZEPRIL HYDROCHLORIDE 5; 20 MG/1; MG/1
1 CAPSULE ORAL DAILY
Status: DISCONTINUED | OUTPATIENT
Start: 2023-09-26 | End: 2023-09-26 | Stop reason: SDUPTHER

## 2023-09-26 RX ORDER — SODIUM CHLORIDE 0.9 % (FLUSH) 0.9 %
5-40 SYRINGE (ML) INJECTION EVERY 12 HOURS SCHEDULED
Status: CANCELLED | OUTPATIENT
Start: 2023-09-26

## 2023-09-26 RX ORDER — ACETAMINOPHEN 650 MG/1
650 SUPPOSITORY RECTAL EVERY 6 HOURS PRN
Status: CANCELLED | OUTPATIENT
Start: 2023-09-26

## 2023-09-26 RX ORDER — SODIUM CHLORIDE 9 MG/ML
INJECTION, SOLUTION INTRAVENOUS PRN
Status: DISCONTINUED | OUTPATIENT
Start: 2023-09-26 | End: 2023-10-05 | Stop reason: HOSPADM

## 2023-09-26 RX ORDER — SODIUM CHLORIDE 0.9 % (FLUSH) 0.9 %
5-40 SYRINGE (ML) INJECTION PRN
Status: CANCELLED | OUTPATIENT
Start: 2023-09-26

## 2023-09-26 RX ORDER — POTASSIUM CHLORIDE 20 MEQ/1
40 TABLET, EXTENDED RELEASE ORAL PRN
Status: DISCONTINUED | OUTPATIENT
Start: 2023-09-26 | End: 2023-10-05 | Stop reason: HOSPADM

## 2023-09-26 RX ORDER — LISINOPRIL 20 MG/1
20 TABLET ORAL DAILY
Status: DISCONTINUED | OUTPATIENT
Start: 2023-09-26 | End: 2023-09-26 | Stop reason: SDUPTHER

## 2023-09-26 RX ORDER — AMLODIPINE BESYLATE 5 MG/1
5 TABLET ORAL DAILY
Status: DISCONTINUED | OUTPATIENT
Start: 2023-09-26 | End: 2023-09-26 | Stop reason: SDUPTHER

## 2023-09-26 RX ORDER — ENOXAPARIN SODIUM 100 MG/ML
40 INJECTION SUBCUTANEOUS EVERY 24 HOURS
Status: CANCELLED | OUTPATIENT
Start: 2023-09-26

## 2023-09-26 RX ORDER — ACETAMINOPHEN 325 MG/1
650 TABLET ORAL EVERY 6 HOURS PRN
Status: CANCELLED | OUTPATIENT
Start: 2023-09-26

## 2023-09-26 RX ORDER — ACETAMINOPHEN 650 MG/1
650 SUPPOSITORY RECTAL EVERY 6 HOURS PRN
Status: DISCONTINUED | OUTPATIENT
Start: 2023-09-26 | End: 2023-10-05 | Stop reason: HOSPADM

## 2023-09-26 RX ORDER — ENOXAPARIN SODIUM 100 MG/ML
40 INJECTION SUBCUTANEOUS EVERY 24 HOURS
Status: DISCONTINUED | OUTPATIENT
Start: 2023-09-26 | End: 2023-10-05 | Stop reason: HOSPADM

## 2023-09-26 RX ORDER — LISINOPRIL 20 MG/1
20 TABLET ORAL DAILY
Status: DISCONTINUED | OUTPATIENT
Start: 2023-09-26 | End: 2023-10-05 | Stop reason: HOSPADM

## 2023-09-26 RX ORDER — POTASSIUM CHLORIDE 20 MEQ/1
40 TABLET, EXTENDED RELEASE ORAL PRN
Status: CANCELLED | OUTPATIENT
Start: 2023-09-26

## 2023-09-26 RX ORDER — MAGNESIUM SULFATE IN WATER 40 MG/ML
2000 INJECTION, SOLUTION INTRAVENOUS PRN
Status: CANCELLED | OUTPATIENT
Start: 2023-09-26

## 2023-09-26 RX ORDER — SODIUM CHLORIDE 0.9 % (FLUSH) 0.9 %
5-40 SYRINGE (ML) INJECTION EVERY 12 HOURS SCHEDULED
Status: DISCONTINUED | OUTPATIENT
Start: 2023-09-26 | End: 2023-10-05 | Stop reason: HOSPADM

## 2023-09-26 RX ORDER — POTASSIUM CHLORIDE 7.45 MG/ML
10 INJECTION INTRAVENOUS PRN
Status: CANCELLED | OUTPATIENT
Start: 2023-09-26

## 2023-09-26 RX ORDER — SODIUM CHLORIDE 9 MG/ML
INJECTION, SOLUTION INTRAVENOUS PRN
Status: CANCELLED | OUTPATIENT
Start: 2023-09-26

## 2023-09-26 RX ORDER — MAGNESIUM SULFATE IN WATER 40 MG/ML
2000 INJECTION, SOLUTION INTRAVENOUS PRN
Status: DISCONTINUED | OUTPATIENT
Start: 2023-09-26 | End: 2023-10-05 | Stop reason: HOSPADM

## 2023-09-26 RX ORDER — AMLODIPINE BESYLATE 5 MG/1
5 TABLET ORAL DAILY
Status: DISCONTINUED | OUTPATIENT
Start: 2023-09-26 | End: 2023-10-05 | Stop reason: HOSPADM

## 2023-09-26 RX ORDER — SODIUM CHLORIDE 0.9 % (FLUSH) 0.9 %
5-40 SYRINGE (ML) INJECTION PRN
Status: DISCONTINUED | OUTPATIENT
Start: 2023-09-26 | End: 2023-10-05 | Stop reason: HOSPADM

## 2023-09-26 RX ORDER — ACETAMINOPHEN 325 MG/1
650 TABLET ORAL EVERY 6 HOURS PRN
Status: DISCONTINUED | OUTPATIENT
Start: 2023-09-26 | End: 2023-10-05 | Stop reason: HOSPADM

## 2023-09-26 RX ORDER — POTASSIUM CHLORIDE 7.45 MG/ML
10 INJECTION INTRAVENOUS PRN
Status: DISCONTINUED | OUTPATIENT
Start: 2023-09-26 | End: 2023-10-05 | Stop reason: HOSPADM

## 2023-09-26 RX ADMIN — Medication 1 AMPULE: at 21:50

## 2023-09-26 RX ADMIN — AMLODIPINE BESYLATE 5 MG: 5 TABLET ORAL at 21:00

## 2023-09-26 RX ADMIN — VANCOMYCIN HYDROCHLORIDE 1000 MG: 1 INJECTION, POWDER, LYOPHILIZED, FOR SOLUTION INTRAVENOUS at 09:42

## 2023-09-26 RX ADMIN — CEFEPIME 2000 MG: 2 INJECTION, POWDER, FOR SOLUTION INTRAVENOUS at 10:55

## 2023-09-26 RX ADMIN — SODIUM CHLORIDE, PRESERVATIVE FREE 10 ML: 5 INJECTION INTRAVENOUS at 21:50

## 2023-09-26 RX ADMIN — SODIUM CHLORIDE, PRESERVATIVE FREE 10 ML: 5 INJECTION INTRAVENOUS at 08:18

## 2023-09-26 RX ADMIN — SODIUM CHLORIDE, POTASSIUM CHLORIDE, SODIUM LACTATE AND CALCIUM CHLORIDE 1000 ML: 600; 310; 30; 20 INJECTION, SOLUTION INTRAVENOUS at 08:14

## 2023-09-26 ASSESSMENT — PAIN SCALES - GENERAL: PAINLEVEL_OUTOF10: 0

## 2023-09-26 NOTE — PROGRESS NOTES
TRANSFER - OUT REPORT:    Verbal report given to Clayton Mckeon RN on Clois Route  being transferred to [facility] 7th floor for routine progression of patient care       Report consisted of patient's Situation, Background, Assessment and   Recommendations(SBAR). Information from the following report(s) Nurse Handoff Report was reviewed with the receiving nurse. Lines:   Peripheral IV 09/24/23 Left Antecubital (Active)   Site Assessment Clean, dry & intact 09/26/23 0917   Line Status Infusing 09/26/23 0917   Phlebitis Assessment No symptoms 09/26/23 0917   Infiltration Assessment 0 09/26/23 0917   Dressing Status Clean, dry & intact 09/26/23 0917   Dressing Type Transparent 09/26/23 0917        Opportunity for questions and clarification was provided.

## 2023-09-26 NOTE — PROGRESS NOTES
Spoke to primary team.  Patient was positive blood cultures. I reviewed the images on the chart which showed cellulitis at the second and third toe. Also reviewed duplex study patient had mild stenosis in his popliteal artery with biphasic flow in his posterior tibial anterior tibial.  Patient be transferred downtown, secondary to elevated white count and positive bacteremia we will plan tomorrow for right second possible third toe amputation. Please keep patient n.p.o. after midnight.     Cam Vandana

## 2023-09-26 NOTE — PROGRESS NOTES
Hospitalist Progress Note   Admit Date:  2023  2:20 PM   Name:  Katie Mario   Age:  68 y.o. Sex:  male  :  1950   MRN:  [id number]   Room:  [facility]    Presenting/Chief Complaint: Skin Problem     Reason(s) for Admission: Diabetic foot ulcer with osteomyelitis ([address] [facility]) [O75.135, E11.69, L97.509, M86.9]  Other acute osteomyelitis of right foot [facility]) [address] Course:   68 y.o. male with medical history of diet-controlled diabetes, hypertension who presented with right second toe wound. He was not on any medicine for his diabetes because he had lost a lot of weight and reduced his sugar intake getting his A1c down to 6.2 at last check. He had been clipping his toenails and had accidentally injured the skin. He had kept it covered with antibiotic ointment on it with the wound progressed and became worse. He has had flatfeet since birth. He has good sensation in his feet but did not notice the wound becoming painful until it advanced to his MTP. Wound cultures were collected in the emergency department where he was noted to have WBC of 18.5 with elevated CRP and serum glucose 157. He was also noted to have an increase of his creatinine. He was admitted for further evaluation management. Subjective & 24hr Events:   Relaxed and present on approach. Unfortunately blood cultures have resulted positive for staph. Called vascular surgery, Dr Shayy Pruett will see him tomorrow. Will be transferred downtown. Contacted Dr. Ernie Dunham and arranged for transition of care. Place consult to infectious disease. Ordered echo.     Assessment & Plan:   Diabetic foot ulcer with osteomyelitis  MRI foot confirmed osteomyelitis, vascular studies positive  -Follow cultures  -Cefepime, vancomycin  -Consult vascular surgery  2023: Transferred downtown for vascular surgery    Staph bacteremia  Admission BCx positive for staph bacteremia 1/2  -Consult infectious disease  -TTE, consider HAWA     Type 2 Differential    Collection Time: 09/24/23  2:55 PM   Result Value Ref Range    WBC 18.5 (H) 4.3 - 11.1 K/uL    RBC 4.48 4.23 - 5.6 M/uL    Hemoglobin 12.2 (L) 13.6 - 17.2 g/dL    Hematocrit 38.1 (L) 41.1 - 50.3 %    MCV 85.0 82.0 - 102.0 FL    MCH 27.2 26.1 - 32.9 PG    MCHC 32.0 31.4 - 35.0 g/dL    RDW 12.7 11.9 - 14.6 %    Platelets 972 648 - 632 K/uL    MPV 10.0 9.4 - 12.3 FL    nRBC 0.00 0.0 - 0.2 K/uL    Differential Type AUTOMATED      Neutrophils % 84 (H) 43 - 78 %    Lymphocytes % 8 (L) 13 - 44 %    Monocytes % 6 4.0 - 12.0 %    Eosinophils % 0 (L) 0.5 - 7.8 %    Basophils % 0 0.0 - 2.0 %    Immature Granulocytes 1 0.0 - 5.0 %    Neutrophils Absolute 15.6 (H) 1.7 - 8.2 K/UL    Lymphocytes Absolute 1.5 0.5 - 4.6 K/UL    Monocytes Absolute 1.2 0.1 - 1.3 K/UL    Eosinophils Absolute 0.1 0.0 - 0.8 K/UL    Basophils Absolute 0.1 0.0 - 0.2 K/UL    Absolute Immature Granulocyte 0.1 0.0 - 0.5 K/UL   Lactate, Sepsis    Collection Time: 09/24/23  2:55 PM   Result Value Ref Range    Lactic Acid, Sepsis 1.4 0.4 - 2.0 MMOL/L   Procalcitonin    Collection Time: 09/24/23  2:55 PM   Result Value Ref Range    Procalcitonin 0.27 0.00 - 0.49 ng/mL   C-Reactive Protein    Collection Time: 09/24/23  2:55 PM   Result Value Ref Range    CRP 21.1 (H) 0.0 - 0.9 mg/dL   Sedimentation Rate    Collection Time: 09/24/23  2:55 PM   Result Value Ref Range    Sed Rate, Automated 40 (H) 0 - 15 mm/hr   Culture, Blood, PCR ID Panel    Collection Time: 09/24/23  2:55 PM    Specimen: Blood   Result Value Ref Range    Accession Number [id number]     Enterococcus faecalis by PCR NOT DETECTED NOTDET      Enterococcus faecium by PCR NOT DETECTED NOTDET      Listeria monocytogenes by PCR NOT DETECTED NOTDET      STAPHYLOCOCCUS Detected (A) NOTDET      Staphylococcus Aureus Detected (A) NOTDET      Staphylococcus epidermidis by PCR NOT DETECTED NOTDET      Staphylococcus lugdunensis by PCR NOT DETECTED NOTDET      STREPTOCOCCUS NOT DETECTED NOTDET Signed:  Luciano Holden MD

## 2023-09-26 NOTE — H&P
Patient transferring DT for vascular surgery assessment. Please notify vascular surgery on arrival.   Noted to have Staph bacteremia on cultures at ES. ID consult placed, not yet contacted. TTE ordered.

## 2023-09-27 ENCOUNTER — ANESTHESIA (OUTPATIENT)
Dept: SURGERY | Age: 73
End: 2023-09-27
Payer: COMMERCIAL

## 2023-09-27 ENCOUNTER — APPOINTMENT (OUTPATIENT)
Dept: NON INVASIVE DIAGNOSTICS | Age: 73
DRG: 853 | End: 2023-09-27
Attending: FAMILY MEDICINE
Payer: COMMERCIAL

## 2023-09-27 LAB
BACTERIA SPEC CULT: ABNORMAL
BACTERIA SPEC CULT: ABNORMAL
ECHO AO ASC DIAM: 3.5 CM
ECHO AO ASCENDING AORTA INDEX: 1.76 CM/M2
ECHO AO ROOT DIAM: 3.7 CM
ECHO AO ROOT INDEX: 1.86 CM/M2
ECHO AV AREA PEAK VELOCITY: 2.7 CM2
ECHO AV AREA VTI: 2.3 CM2
ECHO AV AREA/BSA PEAK VELOCITY: 1.4 CM2/M2
ECHO AV AREA/BSA VTI: 1.2 CM2/M2
ECHO AV MEAN GRADIENT: 6 MMHG
ECHO AV MEAN VELOCITY: 1.1 M/S
ECHO AV PEAK GRADIENT: 9 MMHG
ECHO AV PEAK VELOCITY: 1.5 M/S
ECHO AV VELOCITY RATIO: 0.8
ECHO AV VTI: 33.1 CM
ECHO BSA: 2.02 M2
ECHO EST RA PRESSURE: 3 MMHG
ECHO IVC PROX: 2.4 CM
ECHO LA AREA 2C: 19.6 CM2
ECHO LA AREA 4C: 20.1 CM2
ECHO LA MAJOR AXIS: 5.7 CM
ECHO LA MINOR AXIS: 5.4 CM
ECHO LA VOL 2C: 58 ML (ref 18–58)
ECHO LA VOL 4C: 59 ML (ref 18–58)
ECHO LA VOL BP: 60 ML (ref 18–58)
ECHO LA VOL/BSA BIPLANE: 30 ML/M2 (ref 16–34)
ECHO LA VOLUME INDEX A2C: 29 ML/M2 (ref 16–34)
ECHO LA VOLUME INDEX A4C: 30 ML/M2 (ref 16–34)
ECHO LV E' LATERAL VELOCITY: 10 CM/S
ECHO LV E' SEPTAL VELOCITY: 7 CM/S
ECHO LV EDV A2C: 125 ML
ECHO LV EDV A4C: 139 ML
ECHO LV EDV INDEX A4C: 70 ML/M2
ECHO LV EDV NDEX A2C: 63 ML/M2
ECHO LV EJECTION FRACTION A2C: 51 %
ECHO LV EJECTION FRACTION A4C: 54 %
ECHO LV EJECTION FRACTION BIPLANE: 50 % (ref 55–100)
ECHO LV ESV A2C: 61 ML
ECHO LV ESV A4C: 64 ML
ECHO LV ESV INDEX A2C: 31 ML/M2
ECHO LV ESV INDEX A4C: 32 ML/M2
ECHO LV FRACTIONAL SHORTENING: 27 % (ref 28–44)
ECHO LV INTERNAL DIMENSION DIASTOLE INDEX: 2.61 CM/M2
ECHO LV INTERNAL DIMENSION DIASTOLIC: 5.2 CM (ref 4.2–5.9)
ECHO LV INTERNAL DIMENSION SYSTOLIC INDEX: 1.91 CM/M2
ECHO LV INTERNAL DIMENSION SYSTOLIC: 3.8 CM
ECHO LV IVSD: 0.9 CM (ref 0.6–1)
ECHO LV MASS 2D: 156.9 G (ref 88–224)
ECHO LV MASS INDEX 2D: 78.9 G/M2 (ref 49–115)
ECHO LV POSTERIOR WALL DIASTOLIC: 0.8 CM (ref 0.6–1)
ECHO LV RELATIVE WALL THICKNESS RATIO: 0.31
ECHO LVOT AREA: 3.5 CM2
ECHO LVOT AV VTI INDEX: 0.66
ECHO LVOT DIAM: 2.1 CM
ECHO LVOT MEAN GRADIENT: 3 MMHG
ECHO LVOT PEAK GRADIENT: 6 MMHG
ECHO LVOT PEAK VELOCITY: 1.2 M/S
ECHO LVOT STROKE VOLUME INDEX: 38.3 ML/M2
ECHO LVOT SV: 76.2 ML
ECHO LVOT VTI: 22 CM
ECHO MV A VELOCITY: 1.1 M/S
ECHO MV E DECELERATION TIME (DT): 236 MS
ECHO MV E VELOCITY: 0.78 M/S
ECHO MV E/A RATIO: 0.71
ECHO MV E/E' LATERAL: 7.8
ECHO MV E/E' RATIO (AVERAGED): 9.47
ECHO MV E/E' SEPTAL: 11.14
ECHO PV ACCELERATION TIME (AT): 79 MS
ECHO PV MAX VELOCITY: 0.8 M/S
ECHO PV PEAK GRADIENT: 2 MMHG
ECHO RIGHT VENTRICULAR SYSTOLIC PRESSURE (RVSP): 29 MMHG
ECHO RV BASAL DIMENSION: 3.6 CM
ECHO RV FREE WALL PEAK S': 12 CM/S
ECHO RV TAPSE: 1.5 CM (ref 1.7–?)
ECHO TV REGURGITANT MAX VELOCITY: 2.53 M/S
ECHO TV REGURGITANT PEAK GRADIENT: 26 MMHG
GLUCOSE BLD STRIP.AUTO-MCNC: 153 MG/DL (ref 65–100)
GRAM STN SPEC: ABNORMAL
SERVICE CMNT-IMP: ABNORMAL
SERVICE CMNT-IMP: ABNORMAL

## 2023-09-27 PROCEDURE — 3E0T3BZ INTRODUCTION OF ANESTHETIC AGENT INTO PERIPHERAL NERVES AND PLEXI, PERCUTANEOUS APPROACH: ICD-10-PCS | Performed by: ANESTHESIOLOGY

## 2023-09-27 PROCEDURE — 2580000003 HC RX 258: Performed by: SURGERY

## 2023-09-27 PROCEDURE — 2709999900 HC NON-CHARGEABLE SUPPLY: Performed by: SURGERY

## 2023-09-27 PROCEDURE — 3600000012 HC SURGERY LEVEL 2 ADDTL 15MIN: Performed by: SURGERY

## 2023-09-27 PROCEDURE — 6360000004 HC RX CONTRAST MEDICATION: Performed by: HOSPITALIST

## 2023-09-27 PROCEDURE — 88305 TISSUE EXAM BY PATHOLOGIST: CPT

## 2023-09-27 PROCEDURE — 7100000000 HC PACU RECOVERY - FIRST 15 MIN: Performed by: SURGERY

## 2023-09-27 PROCEDURE — 2580000003 HC RX 258: Performed by: ANESTHESIOLOGY

## 2023-09-27 PROCEDURE — 6370000000 HC RX 637 (ALT 250 FOR IP): Performed by: SURGERY

## 2023-09-27 PROCEDURE — 0Y6R0Z0 DETACHMENT AT RIGHT 2ND TOE, COMPLETE, OPEN APPROACH: ICD-10-PCS | Performed by: SURGERY

## 2023-09-27 PROCEDURE — 87205 SMEAR GRAM STAIN: CPT

## 2023-09-27 PROCEDURE — 64445 NJX AA&/STRD SCIATIC NRV IMG: CPT | Performed by: ANESTHESIOLOGY

## 2023-09-27 PROCEDURE — 87176 TISSUE HOMOGENIZATION CULTR: CPT

## 2023-09-27 PROCEDURE — 87070 CULTURE OTHR SPECIMN AEROBIC: CPT

## 2023-09-27 PROCEDURE — 99222 1ST HOSP IP/OBS MODERATE 55: CPT | Performed by: SURGERY

## 2023-09-27 PROCEDURE — 3700000000 HC ANESTHESIA ATTENDED CARE: Performed by: SURGERY

## 2023-09-27 PROCEDURE — 2500000003 HC RX 250 WO HCPCS: Performed by: ANESTHESIOLOGY

## 2023-09-27 PROCEDURE — 6360000002 HC RX W HCPCS: Performed by: ANESTHESIOLOGY

## 2023-09-27 PROCEDURE — 3700000001 HC ADD 15 MINUTES (ANESTHESIA): Performed by: SURGERY

## 2023-09-27 PROCEDURE — 6360000002 HC RX W HCPCS: Performed by: SURGERY

## 2023-09-27 PROCEDURE — 93306 TTE W/DOPPLER COMPLETE: CPT | Performed by: INTERNAL MEDICINE

## 2023-09-27 PROCEDURE — 2580000003 HC RX 258: Performed by: FAMILY MEDICINE

## 2023-09-27 PROCEDURE — 3600000002 HC SURGERY LEVEL 2 BASE: Performed by: SURGERY

## 2023-09-27 PROCEDURE — 87186 SC STD MICRODIL/AGAR DIL: CPT

## 2023-09-27 PROCEDURE — 2500000003 HC RX 250 WO HCPCS: Performed by: NURSE ANESTHETIST, CERTIFIED REGISTERED

## 2023-09-27 PROCEDURE — 6360000002 HC RX W HCPCS: Performed by: NURSE ANESTHETIST, CERTIFIED REGISTERED

## 2023-09-27 PROCEDURE — 82962 GLUCOSE BLOOD TEST: CPT

## 2023-09-27 PROCEDURE — 6370000000 HC RX 637 (ALT 250 FOR IP): Performed by: STUDENT IN AN ORGANIZED HEALTH CARE EDUCATION/TRAINING PROGRAM

## 2023-09-27 PROCEDURE — A4217 STERILE WATER/SALINE, 500 ML: HCPCS | Performed by: SURGERY

## 2023-09-27 PROCEDURE — 2580000003 HC RX 258: Performed by: HOSPITALIST

## 2023-09-27 PROCEDURE — 1100000000 HC RM PRIVATE

## 2023-09-27 PROCEDURE — 87077 CULTURE AEROBIC IDENTIFY: CPT

## 2023-09-27 PROCEDURE — 28810 AMPUTATION TOE & METATARSAL: CPT | Performed by: SURGERY

## 2023-09-27 PROCEDURE — C8929 TTE W OR WO FOL WCON,DOPPLER: HCPCS

## 2023-09-27 PROCEDURE — 2580000003 HC RX 258: Performed by: NURSE ANESTHETIST, CERTIFIED REGISTERED

## 2023-09-27 PROCEDURE — 7100000001 HC PACU RECOVERY - ADDTL 15 MIN: Performed by: SURGERY

## 2023-09-27 PROCEDURE — 6360000002 HC RX W HCPCS: Performed by: FAMILY MEDICINE

## 2023-09-27 RX ORDER — PROCHLORPERAZINE EDISYLATE 5 MG/ML
5 INJECTION INTRAMUSCULAR; INTRAVENOUS
Status: DISCONTINUED | OUTPATIENT
Start: 2023-09-27 | End: 2023-09-27 | Stop reason: HOSPADM

## 2023-09-27 RX ORDER — ACETAMINOPHEN 500 MG
1000 TABLET ORAL ONCE
Status: DISCONTINUED | OUTPATIENT
Start: 2023-09-27 | End: 2023-09-27 | Stop reason: HOSPADM

## 2023-09-27 RX ORDER — SODIUM CHLORIDE 0.9 % (FLUSH) 0.9 %
5-40 SYRINGE (ML) INJECTION PRN
Status: DISCONTINUED | OUTPATIENT
Start: 2023-09-27 | End: 2023-09-27 | Stop reason: HOSPADM

## 2023-09-27 RX ORDER — SODIUM CHLORIDE 0.9 % (FLUSH) 0.9 %
5-40 SYRINGE (ML) INJECTION EVERY 12 HOURS SCHEDULED
Status: DISCONTINUED | OUTPATIENT
Start: 2023-09-27 | End: 2023-09-27 | Stop reason: HOSPADM

## 2023-09-27 RX ORDER — LIDOCAINE HCL/EPINEPHRINE/PF 2%-1:200K
VIAL (ML) INJECTION PRN
Status: DISCONTINUED | OUTPATIENT
Start: 2023-09-27 | End: 2023-09-27 | Stop reason: SDUPTHER

## 2023-09-27 RX ORDER — MIDAZOLAM HYDROCHLORIDE 2 MG/2ML
2 INJECTION, SOLUTION INTRAMUSCULAR; INTRAVENOUS
Status: COMPLETED | OUTPATIENT
Start: 2023-09-27 | End: 2023-09-27

## 2023-09-27 RX ORDER — LIDOCAINE HYDROCHLORIDE 20 MG/ML
INJECTION, SOLUTION EPIDURAL; INFILTRATION; INTRACAUDAL; PERINEURAL PRN
Status: DISCONTINUED | OUTPATIENT
Start: 2023-09-27 | End: 2023-09-27 | Stop reason: SDUPTHER

## 2023-09-27 RX ORDER — SODIUM CHLORIDE 9 MG/ML
INJECTION, SOLUTION INTRAVENOUS PRN
Status: DISCONTINUED | OUTPATIENT
Start: 2023-09-27 | End: 2023-09-27 | Stop reason: HOSPADM

## 2023-09-27 RX ORDER — PROPOFOL 10 MG/ML
INJECTION, EMULSION INTRAVENOUS PRN
Status: DISCONTINUED | OUTPATIENT
Start: 2023-09-27 | End: 2023-09-27 | Stop reason: SDUPTHER

## 2023-09-27 RX ORDER — SODIUM CHLORIDE, SODIUM LACTATE, POTASSIUM CHLORIDE, CALCIUM CHLORIDE 600; 310; 30; 20 MG/100ML; MG/100ML; MG/100ML; MG/100ML
INJECTION, SOLUTION INTRAVENOUS CONTINUOUS
Status: DISCONTINUED | OUTPATIENT
Start: 2023-09-27 | End: 2023-09-27 | Stop reason: HOSPADM

## 2023-09-27 RX ORDER — ROPIVACAINE HYDROCHLORIDE 5 MG/ML
INJECTION, SOLUTION EPIDURAL; INFILTRATION; PERINEURAL
Status: COMPLETED | OUTPATIENT
Start: 2023-09-27 | End: 2023-09-27

## 2023-09-27 RX ORDER — FENTANYL CITRATE 50 UG/ML
100 INJECTION, SOLUTION INTRAMUSCULAR; INTRAVENOUS
Status: COMPLETED | OUTPATIENT
Start: 2023-09-27 | End: 2023-09-27

## 2023-09-27 RX ORDER — DEXTROSE MONOHYDRATE 100 MG/ML
INJECTION, SOLUTION INTRAVENOUS CONTINUOUS PRN
Status: DISCONTINUED | OUTPATIENT
Start: 2023-09-27 | End: 2023-09-27 | Stop reason: HOSPADM

## 2023-09-27 RX ORDER — LIDOCAINE HYDROCHLORIDE 10 MG/ML
1 INJECTION, SOLUTION INFILTRATION; PERINEURAL
Status: DISCONTINUED | OUTPATIENT
Start: 2023-09-27 | End: 2023-09-27 | Stop reason: HOSPADM

## 2023-09-27 RX ORDER — SODIUM CHLORIDE, SODIUM LACTATE, POTASSIUM CHLORIDE, CALCIUM CHLORIDE 600; 310; 30; 20 MG/100ML; MG/100ML; MG/100ML; MG/100ML
INJECTION, SOLUTION INTRAVENOUS CONTINUOUS
Status: DISCONTINUED | OUTPATIENT
Start: 2023-09-27 | End: 2023-09-27

## 2023-09-27 RX ORDER — OXYCODONE HYDROCHLORIDE 5 MG/1
5 TABLET ORAL
Status: DISCONTINUED | OUTPATIENT
Start: 2023-09-27 | End: 2023-09-27 | Stop reason: HOSPADM

## 2023-09-27 RX ORDER — HYDROMORPHONE HYDROCHLORIDE 2 MG/ML
0.5 INJECTION, SOLUTION INTRAMUSCULAR; INTRAVENOUS; SUBCUTANEOUS EVERY 10 MIN PRN
Status: DISCONTINUED | OUTPATIENT
Start: 2023-09-27 | End: 2023-09-27 | Stop reason: HOSPADM

## 2023-09-27 RX ORDER — DIPHENHYDRAMINE HYDROCHLORIDE 50 MG/ML
12.5 INJECTION INTRAMUSCULAR; INTRAVENOUS
Status: DISCONTINUED | OUTPATIENT
Start: 2023-09-27 | End: 2023-09-27 | Stop reason: HOSPADM

## 2023-09-27 RX ADMIN — ENOXAPARIN SODIUM 40 MG: 100 INJECTION SUBCUTANEOUS at 20:00

## 2023-09-27 RX ADMIN — LIDOCAINE HYDROCHLORIDE 60 MG: 20 INJECTION, SOLUTION EPIDURAL; INFILTRATION; INTRACAUDAL; PERINEURAL at 07:01

## 2023-09-27 RX ADMIN — PHENYLEPHRINE HYDROCHLORIDE 100 MCG: 10 INJECTION INTRAVENOUS at 07:26

## 2023-09-27 RX ADMIN — VANCOMYCIN HYDROCHLORIDE 1250 MG: 10 INJECTION, POWDER, LYOPHILIZED, FOR SOLUTION INTRAVENOUS at 20:42

## 2023-09-27 RX ADMIN — PHENYLEPHRINE HYDROCHLORIDE 50 MCG: 10 INJECTION INTRAVENOUS at 07:20

## 2023-09-27 RX ADMIN — VANCOMYCIN HYDROCHLORIDE 1250 MG: 10 INJECTION, POWDER, LYOPHILIZED, FOR SOLUTION INTRAVENOUS at 04:26

## 2023-09-27 RX ADMIN — PHENYLEPHRINE HYDROCHLORIDE 50 MCG: 10 INJECTION INTRAVENOUS at 07:22

## 2023-09-27 RX ADMIN — Medication 1 AMPULE: at 20:41

## 2023-09-27 RX ADMIN — Medication 1 AMPULE: at 08:55

## 2023-09-27 RX ADMIN — MIDAZOLAM 2 MG: 1 INJECTION INTRAMUSCULAR; INTRAVENOUS at 06:30

## 2023-09-27 RX ADMIN — SODIUM CHLORIDE, POTASSIUM CHLORIDE, SODIUM LACTATE AND CALCIUM CHLORIDE: 600; 310; 30; 20 INJECTION, SOLUTION INTRAVENOUS at 06:28

## 2023-09-27 RX ADMIN — Medication 2000 MG: at 07:06

## 2023-09-27 RX ADMIN — LISINOPRIL 20 MG: 20 TABLET ORAL at 20:41

## 2023-09-27 RX ADMIN — SODIUM CHLORIDE, PRESERVATIVE FREE 0.45 ML: 5 INJECTION INTRAVENOUS at 10:30

## 2023-09-27 RX ADMIN — ROPIVACAINE HYDROCHLORIDE 20 ML: 5 INJECTION, SOLUTION EPIDURAL; INFILTRATION; PERINEURAL at 06:29

## 2023-09-27 RX ADMIN — SODIUM CHLORIDE, PRESERVATIVE FREE 10 ML: 5 INJECTION INTRAVENOUS at 09:05

## 2023-09-27 RX ADMIN — AMLODIPINE BESYLATE 5 MG: 5 TABLET ORAL at 20:41

## 2023-09-27 RX ADMIN — PROPOFOL 40 MG: 10 INJECTION, EMULSION INTRAVENOUS at 07:01

## 2023-09-27 RX ADMIN — PROPOFOL 140 MCG/KG/MIN: 10 INJECTION, EMULSION INTRAVENOUS at 07:02

## 2023-09-27 RX ADMIN — SODIUM CHLORIDE, POTASSIUM CHLORIDE, SODIUM LACTATE AND CALCIUM CHLORIDE: 600; 310; 30; 20 INJECTION, SOLUTION INTRAVENOUS at 08:56

## 2023-09-27 RX ADMIN — SODIUM CHLORIDE, PRESERVATIVE FREE 10 ML: 5 INJECTION INTRAVENOUS at 20:42

## 2023-09-27 RX ADMIN — LIDOCAINE HYDROCHLORIDE,EPINEPHRINE BITARTRATE 10 ML: 20; .005 INJECTION, SOLUTION EPIDURAL; INFILTRATION; INTRACAUDAL; PERINEURAL at 06:29

## 2023-09-27 RX ADMIN — FENTANYL CITRATE 50 MCG: 50 INJECTION, SOLUTION INTRAMUSCULAR; INTRAVENOUS at 06:30

## 2023-09-27 ASSESSMENT — PAIN - FUNCTIONAL ASSESSMENT: PAIN_FUNCTIONAL_ASSESSMENT: 0-10

## 2023-09-27 NOTE — ANESTHESIA PROCEDURE NOTES
Peripheral Block    Patient location during procedure: pre-op  Reason for block: post-op pain management and at surgeon's request  Start time: 9/27/2023 6:29 AM  End time: 9/27/2023 6:32 AM  Staffing  Performed: anesthesiologist   Anesthesiologist: Arron Greene MD  Performed by: Arron Greene MD  Authorized by: Arron Greene MD    Preanesthetic Checklist  Completed: patient identified, IV checked, site marked, risks and benefits discussed, surgical/procedural consents, equipment checked, pre-op evaluation, timeout performed, anesthesia consent given, oxygen available and monitors applied/VS acknowledged  Peripheral Block   Patient position: supine  Prep: ChloraPrep  Provider prep: mask and sterile gloves  Patient monitoring: cardiac monitor, continuous pulse ox, frequent blood pressure checks, IV access, oxygen and responsive to questions  Block type: Sciatic  Popliteal  Laterality: right  Injection technique: single-shot  Guidance: ultrasound guided    Needle   Needle type: insulated echogenic nerve stimulator needle   Needle localization: ultrasound guidance  Assessment   Injection assessment: negative aspiration for heme, no paresthesia on injection, local visualized surrounding nerve on ultrasound and no intravascular symptoms  Paresthesia pain: none  Slow fractionated injection: yes  Hemodynamics: stable  Real-time US image taken/store: yes  Outcomes: uncomplicated and patient tolerated procedure well    Additional Notes  Ultrasound image taken and stored in chart   Medications Administered  ropivacaine (NAROPIN) injection 0.5% - Perineural   20 mL - 9/27/2023 6:29:00 AM

## 2023-09-27 NOTE — CONSULTS
Infectious Disease Consult    Today's Date: 9/27/2023   Admit Date: 9/26/2023    Impression:   MRSA bacteremia (9/24); repeat cxs (9/26) NGTD; TTE pending, source diabetic foot infection  Right 2nd toe OM, diabetic foot infection: s/p 2nd toe amputation- op cxs pending; wound swab cx (9/24) Heavy growth Staph aureus    Plan:   Would continue IV Vancomycin as ordered. Follow repeat blood cultures and TTE. Duration of therapy TBD   ID will continue to follow. Anti-infectives:   Vancomycin 9/24-  Cefepime 9/24-9/26  Ancef in OR 9/27    Subjective:   Date of Consultation:  September 27, 2023  Referring Physician: Dr. Nelly Fong    Patient is a 68 y.o. male who presented on 9/24 with right 2nd toe wound. He was caring for his toenails and accidentally injured his skin. He had kept the area covered with antibiotic ointment and the wound continued to progress and become worse. He does not have neuropathy. He had wound cultures collected in ED that were + heavy staph aureus. Blood cultures from admission also + MRSA in one set. He had Right foot xray with findings concerning for chronic OM of distal phalanx. He had arterial doppler studies performed revealing arterial disease, Vascular surgery consulted. He was transferred to  for evaluation. He underwent 2nd toe amputation this am with Vascular surgery. He has been on Vanc/Cefepime. TTE pending. ID has been consulted for assistance. Patient seen resting in bed with family at bedside. He has no acute systemic complaints.     Patient Active Problem List   Diagnosis    Type 2 diabetes mellitus with hyperglycemia, without long-term current use of insulin ([address])    Erectile dysfunction    Pressure injury of left foot, stage 3 (HCC)    Posterior tibial tendon dysfunction (PTTD) of left lower extremity    Primary hypertension    Basal cell carcinoma    Diabetic foot ulcer with osteomyelitis ([address])    Stage 1 acute kidney injury ([address])    Sepsis due to methicillin resistant

## 2023-09-27 NOTE — PROGRESS NOTES
Hospitalist Progress Note   Admit Date:  2023  6:22 PM   Name:  Rody Rivera   Age:  68 y.o. Sex:  male  :  1950   MRN:  [id number]   Room:  [facility]/    Presenting/Chief Complaint: No chief complaint on file. Reason(s) for Admission: Bacteremia due to Staphylococcus [R78.81, B95.8]     Hospital Course:   Rody Rivera is a 68 y.o. male with medical history of Hypertension, diet-controlled diabetes mellitus type 2, presented to the ER with right second toe wound. A1c on admission is 7.2. He had been clipping his toenails and accidentally injured his skin. Blood cultures done on admission positive for MRSA. He was admitted for sepsis secondary to MRSA bacteremia. Subjective & 24hr Events:   Patient had amputation of the Right second toe this morning. He is alert awake oriented x3. Answering questions appropriately. No fever no chills. No chest pain or shortness of breath. No nausea no vomiting. Assessment & Plan: This is a 71-year-old male with    Sepsis secondary to MRSA bacteremia  Diabetic foot ulcer with osteomyelitis/right second toe infection. Status post amputation of the right second toe postop day 0  Blood cultures on admission positive for MRSA. Echocardiogram ordered. ID consulted. Appreciate recommendations. Continue vancomycin. Vascular surgery on board. Appreciate recommendations. Patient underwent right second toe amputation today. Diabetes mellitus type 2  Admission A1c 7.2. Acute kidney injury  Creatinine is 1.08 yesterday. Repeat BMP in AM.   Avoid nephrotoxic medications. Monitor renal function. Hypertension  Amlodipine-benazepril on hold. PT/OT evals and PPD needed/ordered? Yes    Dispo: Anticipate inpatient hospital stay for 48 to 72 hours. PT/OT eval.      Diet:  ADULT DIET; Regular  ADULT ORAL NUTRITION SUPPLEMENT; Breakfast, Dinner; Wound Healing Oral Supplement  ADULT ORAL NUTRITION SUPPLEMENT; Lunch;  Low

## 2023-09-27 NOTE — PERIOP NOTE
Pt received IV sedation medication for peripheral nerve block. Continuous heart monitoring and pulse oximetry in place. 4L of oxygen via nasal cannula. Vital signs monitored per protocol post nerve block.

## 2023-09-27 NOTE — CARE COORDINATION
Pt is a 67 yo male admitted due to bacteremia. Pt underwent amputation of his right second toe today. ID consulted to provide ABX recommendations. [facility]. Wound vac order received and forwarded to /[facility]. Choice of [address] [place] [address],[address] agency pending. CM following.        09/27/23 8120   Service Assessment   Patient Orientation Alert and Oriented   Cognition Alert   History Provided By Medical Record   Primary Caregiver Self   Support Systems Spouse/Significant Other   Patient's Healthcare Decision Maker is: Legal Next of Kin   PCP Verified by CM Yes   Last Visit to PCP Within last year   Prior Functional Level Independent in ADLs/IADLs   Current Functional Level Assistance with the following:;Mobility   Can patient return to prior living arrangement Yes   Ability to make needs known: Good   Family able to assist with home care needs: Yes   Would you like for me to discuss the discharge plan with any other family members/significant others, and if so, who? No   Financial Resources Medicare; Other (Comment)  (commercial policy)   Community Resources None   Social/Functional History   ADL Assistance Independent   Homemaking Assistance Independent   Ambulation Assistance Needs assistance   Transfer Assistance Independent   Occupation Full time employment   Discharge Planning   Type of Residence House   Living Arrangements Spouse/Significant Other   Current Services Prior To Admission None   Potential Assistance Needed Outpatient IV; Home Care; Other (Comment)  (wound vac)   DME Ordered? Wound Vac   Potential Assistance Purchasing Medications No   Type of Home Care Services PT;Nursing Services   Patient expects to be discharged to: House   Follow Up Appointment: Best Day/Time  Monday AM   One/Two Story Residence One story   History of falls?  0   Services At/After Discharge   Transition of Care Consult (CM Consult) Discharge Planning;DME/Supply Assistance   Services

## 2023-09-27 NOTE — CONSULTS
Comprehensive Nutrition Assessment    Type and Reason for Visit: Initial, Wound, Consult  Wounds (Hospitalists)    Nutrition Recommendations/Plan:   Meals and Snacks:  Diet: Continue current order  Nutrition Supplement Therapy:  Medical food supplement therapy:  Initiate Ensure High Protein once per day (this provides 160 kcal and 16 grams protein per bottle) and Dank twice per day (this provides 90 kcal and 2.5 grams protein per packet)       Malnutrition Assessment:  Malnutrition Status: No malnutrition    NFPE unremarkable  Nutrition Assessment:  Nutrition History: Patient reports that at home he follows very strict diet due to his diabetes and need to stay well inorder to take care of his wife. He reports he aims to get about 70 grams of protein each day through real food. He says wife does use ensure but he does not use them. He denies any recent weight loss (confirmed by EMR history). He states no changes to intake prior to admission. Do You Have Any Cultural, Gnosticist, or Ethnic Food Preferences?: No   Nutrition Background:   Wound Type: Diabetic Ulcer   PMH: HTN, DM. Patient presented with 2nd toe wound, amputation 9/27. Nutrition Interval:  Patient laying in bed eating breakfast with 1 guest present in room. Patient states that he already ate his sausage and was 50% done with waffles at time of RD visit. Discussed the importance of good intake and high protein during recovery. Patient willing to try dank and Ensure high protein while in hospital. Although discussed importance of drinking 1 protein supplement daily once discharged for a least 2 weeks patient seemed unsure. He states he will do it if he must but does not like the fake protein. Breakfast allowed post surgery 9/27.     Current Nutrition Therapies:  Diet NPO    Current Intake:   Average Meal Intake: 51-75% (RD witnessed pt eating breakfast) Average Supplements Intake: None Ordered      Anthropometric Measures:  Height: 5' 8\" (172.7

## 2023-09-27 NOTE — BRIEF OP NOTE
[address] [address]  [phone] FAX: [phone]    Brief Op Note Template Note    Pre-Op Diagnosis: ESRD (end stage renal disease) ([address]) [N18.6]    Post-Op Diagnosis:  * No post-op diagnosis entered *    Procedures: Procedure(s) with comments:  RIGHT 2ND TOE AMPUTATION - O.R. 12    Surgeon: Shanel Dumont MD    Assistants: Surgeon(s):  Shanel Dumont MD      Anesthesia:  General     Findings: Infected right second toe    Tourniquet Time:  * No tourniquets in log *    Estimated Blood Loss:               Specimens:            Implants:  * No implants in log *    Complications: None               Signed: Shanel Dumont MD      Elements of this note have been dictated using speech recognition software. As a result, errors of speech recognition may have occurred.

## 2023-09-27 NOTE — ANESTHESIA POSTPROCEDURE EVALUATION
Department of Anesthesiology  Postprocedure Note    Patient: Angelic Palma  MRN: [id number]  YOB: 1950  Date of evaluation: 9/27/2023      Procedure Summary     Date: 09/27/23 Room / Location:  MAIN OR  /  MAIN OR    Anesthesia Start: 1014 Anesthesia Stop: 2016    Procedure: RIGHT SECOND TOE AMPUTATION (Right: Foot) Diagnosis:       ESRD (end stage renal disease) ([address])      (ESRD (end stage renal disease) ([address]) [N18.6])    Providers: Linda Sanders MD Responsible Provider: Rossana Nance MD    Anesthesia Type: TIVA ASA Status: 3          Anesthesia Type: No value filed. Junie Phase I: Junie Score: 9    Junie Phase II:        Anesthesia Post Evaluation    Patient location during evaluation: PACU  Patient participation: complete - patient participated  Level of consciousness: awake and alert  Airway patency: patent  Nausea: well controlled. Complications: no  Cardiovascular status: acceptable.   Respiratory status: acceptable  Hydration status: stable  Pain management: adequate

## 2023-09-27 NOTE — PROGRESS NOTES
Status post right second toe amputation and drainage of abscess cavity. Wound is measuring 2 x 3 x 4 cm. Cultures were sent of abscess cavity and distal metatarsal.  Wound will be packed starting tomorrow every shift with iodoform dressing. If wound is clean by Friday can potentially be discharged home with home wound VAC. Patient has no activity restrictions can ambulate with offloading shoe.     Marzena Ngo

## 2023-09-27 NOTE — CONSULTS
[address]   [address] FAX: [phone]    Cristian Holliday  1950    No chief complaint on file. HPI   Mr. Cristian Holliday is a 68y.o. year old male who presents with an infected right second toe.     Current Facility-Administered Medications   Medication Dose Route Frequency Provider Last Rate Last Admin    lidocaine 1 % injection 1 mL  1 mL IntraDERmal Once PRN Chela Harrington MD        acetaminophen (TYLENOL) tablet 1,000 mg  1,000 mg Oral Once Chela Harrington MD        fentaNYL (SUBLIMAZE) injection 100 mcg  100 mcg IntraVENous Once PRN Chela Harrington MD        lactated ringers IV soln infusion   IntraVENous Continuous Chela Harrington  mL/hr at 09/27/23 0628 New Bag at 09/27/23 2765    sodium chloride flush 0.9 % injection 5-40 mL  5-40 mL IntraVENous 2 times per day Chela Harrington MD        sodium chloride flush 0.9 % injection 5-40 mL  5-40 mL IntraVENous PRN Chela Harrington MD        0.9 % sodium chloride infusion   IntraVENous PRN Chela Harrington MD        midazolam PF (VERSED) injection 2 mg  2 mg IntraVENous Once PRN Chela Harrington MD        lisinopril (PRINIVIL;ZESTRIL) tablet 20 mg  20 mg Oral Daily Monserrat Lozano MD        amLODIPine (NORVASC) tablet 5 mg  5 mg Oral Daily Monserrat Lozano MD   5 mg at 09/26/23 2100    sodium chloride flush 0.9 % injection 5-40 mL  5-40 mL IntraVENous 2 times per day Giselle Bates MD   10 mL at 09/26/23 2150    sodium chloride flush 0.9 % injection 5-40 mL  5-40 mL IntraVENous PRN Giselle Bates MD        0.9 % sodium chloride infusion   IntraVENous PRN Giselle Bates MD        enoxaparin (LOVENOX) injection 40 mg  40 mg SubCUTAneous Q24H Giselle Bates MD        acetaminophen (TYLENOL) tablet 650 mg  650 mg Oral Q6H PRN Giselle Bates MD        Or    acetaminophen (TYLENOL) suppository 650 mg  650 mg Rectal Q6H PRN Giselle Bates MD        potassium chloride (KLOR-CON M) extended release tablet 40 mEq

## 2023-09-28 ENCOUNTER — HOME HEALTH ADMISSION (OUTPATIENT)
Dept: HOME HEALTH SERVICES | Facility: HOME HEALTH | Age: 73
End: 2023-09-28

## 2023-09-28 LAB
ANION GAP SERPL CALC-SCNC: 8 MMOL/L (ref 2–11)
BACTERIA SPEC CULT: ABNORMAL
BACTERIA SPEC CULT: ABNORMAL
BASOPHILS # BLD: 0.1 K/UL (ref 0–0.2)
BASOPHILS NFR BLD: 1 % (ref 0–2)
BUN SERPL-MCNC: 21 MG/DL (ref 8–23)
CALCIUM SERPL-MCNC: 8.6 MG/DL (ref 8.3–10.4)
CHLORIDE SERPL-SCNC: 106 MMOL/L (ref 101–110)
CO2 SERPL-SCNC: 24 MMOL/L (ref 21–32)
CREAT SERPL-MCNC: 1 MG/DL (ref 0.8–1.5)
DIFFERENTIAL METHOD BLD: ABNORMAL
EOSINOPHIL # BLD: 0.2 K/UL (ref 0–0.8)
EOSINOPHIL NFR BLD: 1 % (ref 0.5–7.8)
ERYTHROCYTE [DISTWIDTH] IN BLOOD BY AUTOMATED COUNT: 13.1 % (ref 11.9–14.6)
GLUCOSE BLD STRIP.AUTO-MCNC: 144 MG/DL (ref 65–100)
GLUCOSE BLD STRIP.AUTO-MCNC: 175 MG/DL (ref 65–100)
GLUCOSE BLD STRIP.AUTO-MCNC: 176 MG/DL (ref 65–100)
GLUCOSE SERPL-MCNC: 143 MG/DL (ref 65–100)
GRAM STN SPEC: ABNORMAL
GRAM STN SPEC: ABNORMAL
HCT VFR BLD AUTO: 30.3 % (ref 41.1–50.3)
HGB BLD-MCNC: 9.9 G/DL (ref 13.6–17.2)
IMM GRANULOCYTES # BLD AUTO: 0.1 K/UL (ref 0–0.5)
IMM GRANULOCYTES NFR BLD AUTO: 1 % (ref 0–5)
LYMPHOCYTES # BLD: 2.2 K/UL (ref 0.5–4.6)
LYMPHOCYTES NFR BLD: 18 % (ref 13–44)
MCH RBC QN AUTO: 27.8 PG (ref 26.1–32.9)
MCHC RBC AUTO-ENTMCNC: 32.7 G/DL (ref 31.4–35)
MCV RBC AUTO: 85.1 FL (ref 82–102)
MM INDURATION, POC: 0 MM (ref 0–5)
MONOCYTES # BLD: 1 K/UL (ref 0.1–1.3)
MONOCYTES NFR BLD: 9 % (ref 4–12)
NEUTS SEG # BLD: 8.3 K/UL (ref 1.7–8.2)
NEUTS SEG NFR BLD: 70 % (ref 43–78)
NRBC # BLD: 0 K/UL (ref 0–0.2)
PLATELET # BLD AUTO: 332 K/UL (ref 150–450)
PMV BLD AUTO: 10.1 FL (ref 9.4–12.3)
POTASSIUM SERPL-SCNC: 4 MMOL/L (ref 3.5–5.1)
PPD, POC: NEGATIVE
RBC # BLD AUTO: 3.56 M/UL (ref 4.23–5.6)
SERVICE CMNT-IMP: ABNORMAL
SODIUM SERPL-SCNC: 138 MMOL/L (ref 133–143)
WBC # BLD AUTO: 11.8 K/UL (ref 4.3–11.1)

## 2023-09-28 PROCEDURE — 1100000000 HC RM PRIVATE

## 2023-09-28 PROCEDURE — 99024 POSTOP FOLLOW-UP VISIT: CPT | Performed by: SURGERY

## 2023-09-28 PROCEDURE — 36415 COLL VENOUS BLD VENIPUNCTURE: CPT

## 2023-09-28 PROCEDURE — 6370000000 HC RX 637 (ALT 250 FOR IP): Performed by: SURGERY

## 2023-09-28 PROCEDURE — 80048 BASIC METABOLIC PNL TOTAL CA: CPT

## 2023-09-28 PROCEDURE — 2580000003 HC RX 258: Performed by: NURSE PRACTITIONER

## 2023-09-28 PROCEDURE — 6360000002 HC RX W HCPCS: Performed by: NURSE PRACTITIONER

## 2023-09-28 PROCEDURE — 85025 COMPLETE CBC W/AUTO DIFF WBC: CPT

## 2023-09-28 PROCEDURE — 6360000002 HC RX W HCPCS: Performed by: SURGERY

## 2023-09-28 PROCEDURE — 82962 GLUCOSE BLOOD TEST: CPT

## 2023-09-28 PROCEDURE — 2580000003 HC RX 258: Performed by: SURGERY

## 2023-09-28 RX ORDER — DEXTROSE MONOHYDRATE 100 MG/ML
INJECTION, SOLUTION INTRAVENOUS CONTINUOUS PRN
Status: DISCONTINUED | OUTPATIENT
Start: 2023-09-28 | End: 2023-10-05 | Stop reason: HOSPADM

## 2023-09-28 RX ORDER — INSULIN LISPRO 100 [IU]/ML
0-8 INJECTION, SOLUTION INTRAVENOUS; SUBCUTANEOUS
Status: DISCONTINUED | OUTPATIENT
Start: 2023-09-28 | End: 2023-10-05 | Stop reason: HOSPADM

## 2023-09-28 RX ORDER — INSULIN LISPRO 100 [IU]/ML
0-4 INJECTION, SOLUTION INTRAVENOUS; SUBCUTANEOUS NIGHTLY
Status: DISCONTINUED | OUTPATIENT
Start: 2023-09-28 | End: 2023-10-05 | Stop reason: HOSPADM

## 2023-09-28 RX ADMIN — Medication 1 AMPULE: at 21:02

## 2023-09-28 RX ADMIN — LISINOPRIL 20 MG: 20 TABLET ORAL at 21:04

## 2023-09-28 RX ADMIN — SODIUM CHLORIDE, PRESERVATIVE FREE 10 ML: 5 INJECTION INTRAVENOUS at 22:31

## 2023-09-28 RX ADMIN — Medication 1 AMPULE: at 07:23

## 2023-09-28 RX ADMIN — AMLODIPINE BESYLATE 5 MG: 5 TABLET ORAL at 21:03

## 2023-09-28 RX ADMIN — VANCOMYCIN HYDROCHLORIDE 1000 MG: 1 INJECTION, POWDER, LYOPHILIZED, FOR SOLUTION INTRAVENOUS at 16:28

## 2023-09-28 RX ADMIN — ENOXAPARIN SODIUM 40 MG: 100 INJECTION SUBCUTANEOUS at 21:03

## 2023-09-28 RX ADMIN — SODIUM CHLORIDE, PRESERVATIVE FREE 10 ML: 5 INJECTION INTRAVENOUS at 07:24

## 2023-09-28 NOTE — PROGRESS NOTES
Infectious Disease Progress Note    Today's Date: 9/28/2023   Admit Date: 9/26/2023    Impression:   MRSA bacteremia (9/24); repeat cxs (9/26) NGTD; TTE pending, source diabetic foot infection  Right 2nd toe OM, diabetic foot infection: s/p 2nd toe amputation- op cxs pending; wound swab cx (9/24) Heavy growth Staph aureus    Plan:   Continue Vancomycin with plan for 2 weeks from negative cultures, EOT 10/10/23. Can place PICC line today. At the end of Vancomycin would transition to Doxycycline 100 mg PO BID for another 4 weeks, EOT 11/7/23. Follow up in ID office on 11/7/23 at 2 pm.    ID OPAT/OPFU  1) Vancomycin  1 gm IV every 12 hours with EOT 10/10/23  2) Routine PICC Care  3) Labs:  Every Monday: CBC, Cr, LFTs, and VT  Every Thursday: Cr and VT   Please fax results to [phone]. *Okay to pull PICC line at EOT. Anti-infectives:   Vancomycin 9/24-  Cefepime 9/24-9/26  Ancef in OR 9/27    Subjective: Interval History: Seen sitting up in recliner. Denies any complaints or concerns. Updated on ID plan. Patient is a 68 y.o. male who presented on 9/24 with right 2nd toe wound. He was caring for his toenails and accidentally injured his skin. He had kept the area covered with antibiotic ointment and the wound continued to progress and become worse. He does not have neuropathy. He had wound cultures collected in ED that were + heavy staph aureus. Blood cultures from admission also + MRSA in one set. He had Right foot xray with findings concerning for chronic OM of distal phalanx. He had arterial doppler studies performed revealing arterial disease, Vascular surgery consulted. He was transferred to  for evaluation. He underwent 2nd toe amputation this am with Vascular surgery. He has been on Vanc/Cefepime. TTE pending. ID has been consulted for assistance. Patient seen resting in bed with family at bedside. He has no acute systemic complaints.         No Known Allergies     Review of Systems:  A including early osteomyelitis and reactive osteitis.   2.  Probable circumferential collection about the second toe proximal and middle  phalanges     Signed By: Kathryn Thomas, APRN - CNP     September 28, 2023

## 2023-09-29 LAB
ANION GAP SERPL CALC-SCNC: 7 MMOL/L (ref 2–11)
BACTERIA SPEC CULT: NORMAL
BACTERIA SPEC CULT: NORMAL
BASOPHILS # BLD: 0.1 K/UL (ref 0–0.2)
BASOPHILS NFR BLD: 1 % (ref 0–2)
BUN SERPL-MCNC: 24 MG/DL (ref 8–23)
CALCIUM SERPL-MCNC: 8.4 MG/DL (ref 8.3–10.4)
CHLORIDE SERPL-SCNC: 106 MMOL/L (ref 101–110)
CO2 SERPL-SCNC: 24 MMOL/L (ref 21–32)
CREAT SERPL-MCNC: 0.9 MG/DL (ref 0.8–1.5)
DIFFERENTIAL METHOD BLD: ABNORMAL
EOSINOPHIL # BLD: 0.2 K/UL (ref 0–0.8)
EOSINOPHIL NFR BLD: 2 % (ref 0.5–7.8)
ERYTHROCYTE [DISTWIDTH] IN BLOOD BY AUTOMATED COUNT: 13.2 % (ref 11.9–14.6)
GLUCOSE BLD STRIP.AUTO-MCNC: 175 MG/DL (ref 65–100)
GLUCOSE BLD STRIP.AUTO-MCNC: 184 MG/DL (ref 65–100)
GLUCOSE BLD STRIP.AUTO-MCNC: 196 MG/DL (ref 65–100)
GLUCOSE BLD STRIP.AUTO-MCNC: 242 MG/DL (ref 65–100)
GLUCOSE SERPL-MCNC: 182 MG/DL (ref 65–100)
HCT VFR BLD AUTO: 29.6 % (ref 41.1–50.3)
HGB BLD-MCNC: 9.5 G/DL (ref 13.6–17.2)
IMM GRANULOCYTES # BLD AUTO: 0.1 K/UL (ref 0–0.5)
IMM GRANULOCYTES NFR BLD AUTO: 1 % (ref 0–5)
LYMPHOCYTES # BLD: 1.7 K/UL (ref 0.5–4.6)
LYMPHOCYTES NFR BLD: 14 % (ref 13–44)
MCH RBC QN AUTO: 27.6 PG (ref 26.1–32.9)
MCHC RBC AUTO-ENTMCNC: 32.1 G/DL (ref 31.4–35)
MCV RBC AUTO: 86 FL (ref 82–102)
MONOCYTES # BLD: 1.1 K/UL (ref 0.1–1.3)
MONOCYTES NFR BLD: 9 % (ref 4–12)
NEUTS SEG # BLD: 8.5 K/UL (ref 1.7–8.2)
NEUTS SEG NFR BLD: 73 % (ref 43–78)
NRBC # BLD: 0 K/UL (ref 0–0.2)
PLATELET # BLD AUTO: 356 K/UL (ref 150–450)
PMV BLD AUTO: 10.2 FL (ref 9.4–12.3)
POTASSIUM SERPL-SCNC: 4.5 MMOL/L (ref 3.5–5.1)
RBC # BLD AUTO: 3.44 M/UL (ref 4.23–5.6)
SERVICE CMNT-IMP: ABNORMAL
SERVICE CMNT-IMP: NORMAL
SERVICE CMNT-IMP: NORMAL
SODIUM SERPL-SCNC: 137 MMOL/L (ref 133–143)
VANCOMYCIN SERPL-MCNC: 23.6 UG/ML
WBC # BLD AUTO: 11.6 K/UL (ref 4.3–11.1)

## 2023-09-29 PROCEDURE — C1751 CATH, INF, PER/CENT/MIDLINE: HCPCS

## 2023-09-29 PROCEDURE — 2580000003 HC RX 258: Performed by: SURGERY

## 2023-09-29 PROCEDURE — 6360000002 HC RX W HCPCS: Performed by: NURSE PRACTITIONER

## 2023-09-29 PROCEDURE — 02HV33Z INSERTION OF INFUSION DEVICE INTO SUPERIOR VENA CAVA, PERCUTANEOUS APPROACH: ICD-10-PCS | Performed by: INTERNAL MEDICINE

## 2023-09-29 PROCEDURE — 6360000002 HC RX W HCPCS: Performed by: HOSPITALIST

## 2023-09-29 PROCEDURE — 2580000003 HC RX 258: Performed by: NURSE PRACTITIONER

## 2023-09-29 PROCEDURE — 80202 ASSAY OF VANCOMYCIN: CPT

## 2023-09-29 PROCEDURE — 97530 THERAPEUTIC ACTIVITIES: CPT

## 2023-09-29 PROCEDURE — 97166 OT EVAL MOD COMPLEX 45 MIN: CPT

## 2023-09-29 PROCEDURE — 97607 NEG PRS WND THR NDME<=50SQCM: CPT

## 2023-09-29 PROCEDURE — 36573 INSJ PICC RS&I 5 YR+: CPT

## 2023-09-29 PROCEDURE — 97161 PT EVAL LOW COMPLEX 20 MIN: CPT

## 2023-09-29 PROCEDURE — 80048 BASIC METABOLIC PNL TOTAL CA: CPT

## 2023-09-29 PROCEDURE — 2580000003 HC RX 258: Performed by: HOSPITALIST

## 2023-09-29 PROCEDURE — 97605 NEG PRS WND THER DME<=50SQCM: CPT

## 2023-09-29 PROCEDURE — 1100000000 HC RM PRIVATE

## 2023-09-29 PROCEDURE — 6370000000 HC RX 637 (ALT 250 FOR IP): Performed by: SURGERY

## 2023-09-29 PROCEDURE — 82962 GLUCOSE BLOOD TEST: CPT

## 2023-09-29 PROCEDURE — 36415 COLL VENOUS BLD VENIPUNCTURE: CPT

## 2023-09-29 PROCEDURE — 85025 COMPLETE CBC W/AUTO DIFF WBC: CPT

## 2023-09-29 PROCEDURE — 99024 POSTOP FOLLOW-UP VISIT: CPT | Performed by: SURGERY

## 2023-09-29 PROCEDURE — 6360000002 HC RX W HCPCS: Performed by: SURGERY

## 2023-09-29 RX ORDER — VANCOMYCIN HYDROCHLORIDE 1 G/20ML
1000 INJECTION, POWDER, LYOPHILIZED, FOR SOLUTION INTRAVENOUS EVERY 12 HOURS
Status: SHIPPED | OUTPATIENT
Start: 2023-09-29 | End: 2023-10-10

## 2023-09-29 RX ORDER — DOXYCYCLINE HYCLATE 100 MG
100 TABLET ORAL 2 TIMES DAILY
Qty: 56 TABLET | Refills: 0 | Status: SHIPPED | OUTPATIENT
Start: 2023-10-10 | End: 2023-10-05 | Stop reason: HOSPADM

## 2023-09-29 RX ADMIN — Medication 1 AMPULE: at 09:00

## 2023-09-29 RX ADMIN — AMLODIPINE BESYLATE 5 MG: 5 TABLET ORAL at 17:23

## 2023-09-29 RX ADMIN — ENOXAPARIN SODIUM 40 MG: 100 INJECTION SUBCUTANEOUS at 21:04

## 2023-09-29 RX ADMIN — ACETAMINOPHEN 650 MG: 325 TABLET ORAL at 11:35

## 2023-09-29 RX ADMIN — ACETAMINOPHEN 650 MG: 325 TABLET ORAL at 17:30

## 2023-09-29 RX ADMIN — SODIUM CHLORIDE, PRESERVATIVE FREE 5 ML: 5 INJECTION INTRAVENOUS at 09:05

## 2023-09-29 RX ADMIN — LISINOPRIL 20 MG: 20 TABLET ORAL at 17:23

## 2023-09-29 RX ADMIN — VANCOMYCIN HYDROCHLORIDE 1000 MG: 1 INJECTION, POWDER, LYOPHILIZED, FOR SOLUTION INTRAVENOUS at 02:27

## 2023-09-29 RX ADMIN — SODIUM CHLORIDE, PRESERVATIVE FREE 10 ML: 5 INJECTION INTRAVENOUS at 21:04

## 2023-09-29 RX ADMIN — VANCOMYCIN HYDROCHLORIDE 1000 MG: 1 INJECTION, POWDER, LYOPHILIZED, FOR SOLUTION INTRAVENOUS at 15:51

## 2023-09-29 ASSESSMENT — PAIN SCALES - GENERAL
PAINLEVEL_OUTOF10: 3
PAINLEVEL_OUTOF10: 0
PAINLEVEL_OUTOF10: 3
PAINLEVEL_OUTOF10: 0
PAINLEVEL_OUTOF10: 0

## 2023-09-29 ASSESSMENT — PAIN DESCRIPTION - FREQUENCY
FREQUENCY: INTERMITTENT
FREQUENCY: INTERMITTENT

## 2023-09-29 ASSESSMENT — PAIN DESCRIPTION - DESCRIPTORS
DESCRIPTORS: DISCOMFORT
DESCRIPTORS: DISCOMFORT

## 2023-09-29 ASSESSMENT — PAIN DESCRIPTION - ORIENTATION
ORIENTATION: POSTERIOR
ORIENTATION: RIGHT

## 2023-09-29 ASSESSMENT — PAIN DESCRIPTION - PAIN TYPE
TYPE: SURGICAL PAIN
TYPE: SURGICAL PAIN

## 2023-09-29 ASSESSMENT — PAIN DESCRIPTION - LOCATION
LOCATION: KNEE
LOCATION: KNEE

## 2023-09-29 ASSESSMENT — PAIN DESCRIPTION - ONSET
ONSET: GRADUAL
ONSET: GRADUAL

## 2023-09-29 ASSESSMENT — PAIN - FUNCTIONAL ASSESSMENT
PAIN_FUNCTIONAL_ASSESSMENT: PREVENTS OR INTERFERES SOME ACTIVE ACTIVITIES AND ADLS
PAIN_FUNCTIONAL_ASSESSMENT: PREVENTS OR INTERFERES SOME ACTIVE ACTIVITIES AND ADLS

## 2023-09-29 NOTE — WOUND CARE
Right 2nd toe amputation site, clean viable subcutaneous tissue exposed, 2x1. 5x2cm, wound vac applied, seal achieved, seal bolstered with hydrocolloid at each side of the wound, machine working well, kerlex wrapped for patient comfort, gauze between toes. Dressing change due Monday. Will plan to see Monday if not discharged over weekend. Noted Dr. Tete Stevenson said ok to shower / get wound wet on dressing change days, can try to plan with patient the timing for shower on Monday. Noted plan for possible rehab now, hospital machine used today and home machine and supplies in room.

## 2023-09-29 NOTE — PROGRESS NOTES
None    RESTRICTIONS/PRECAUTIONS:  Restrictions/Precautions: Fall Risk, Weight Bearing  Right Lower Extremity Weight Bearing:  (offloading shoe)    PAIN: VITALS / O2:   Pre Treatment:   Pain Assessment: None - Denies Pain      Post Treatment: no c/o pain, resting comfortably in bedside chair       Vitals          Oxygen            GROSS EVALUATION: INTACT IMPAIRED   (See Comments)   UE AROM [x] []   UE PROM [x] []   Strength []  Generally weak but functional      Posture / Balance [] Posture: Fair  Sitting - Static: Good, -  Sitting - Dynamic: Fair, +  Standing - Static: Fair  Standing - Dynamic: Fair, -   Sensation [x]     Coordination [x]       Tone [x]       Edema [x]    Activity Tolerance [] Patient limited by fatigue, Patient Tolerated treatment well     Hand Dominance R [] L []      COGNITION/  PERCEPTION: INTACT IMPAIRED   (See Comments)   Orientation [x]     Vision [x]     Hearing [x]     Cognition  []  Decreased insight into safety awareness   Perception [x]       MOBILITY: I Mod I S SBA CGA Min Mod Max Total  NT x2 Comments:   Bed Mobility    Rolling [] [] [] [] [] [] [] [] [] [x] []    Supine to Sit [] [] [] [] [] [] [] [] [] [x] [] Pt received sitting in chair   Scooting [] [] [] [] [] [] [] [] [] [x] []    Sit to Supine [] [] [] [] [] [] [] [] [] [x] []    Transfers    Sit to Stand [] [] [] [] [] [] [x] [] [] [] [] RW   Bed to Chair [] [] [] [] [] [x] [x] [] [] [] [] RW   Stand to Sit [] [] [] [] [] [x] [] [] [] [] [] RW   Tub/Shower [] [] [] [] [] [] [] [] [] [x] []     Toilet [] [] [] [] [] [] [] [] [] [x] []      [] [] [] [] [] [] [] [] [] [x] []    I=Independent, Mod I=Modified Independent, S=Supervision/Setup, SBA=Standby Assistance, CGA=Contact Guard Assistance, Min=Minimal Assistance, Mod=Moderate Assistance, Max=Maximal Assistance, Total=Total Assistance, NT=Not Tested    ACTIVITIES OF DAILY LIVING: I Mod I S SBA CGA Min Mod Max Total NT Comments   BASIC ADLs:              Upper Body Bathing  [] [] [] [] [] [] [] [] [] [x]     Lower Body Bathing [] [] [] [] [] [] [] [] [] [x]     Toileting [] [] [] [] [] [] [] [] [] [x]    Upper Body Dressing [] [] [] [] [] [] [] [] [] [x]    Lower Body Dressing [] [] [] [] [] [] [] [x] [] [] Donning/doffing sock/shoe on LLE   Feeding [] [] [] [] [] [] [] [] [] [x]    Grooming [] [] [] [] [] [] [] [] [] [x]    Personal Device Care [] [] [] [] [] [] [] [] [] [x]    Functional Mobility [] [] [] [] [] [x] [x] [] [] [] RW   I=Independent, Mod I=Modified Independent, S=Supervision/Setup, SBA=Standby Assistance, CGA=Contact Guard Assistance, Min=Minimal Assistance, Mod=Moderate Assistance, Max=Maximal Assistance, Total=Total Assistance, NT=Not Tested    PLAN:   FREQUENCY/DURATION   OT Plan of Care: 4 times/week for duration of hospital stay or until stated goals are met, whichever comes first.    PROBLEM LIST:   (Skilled intervention is medically necessary to address:)  Decreased ADL/Functional Activities  Decreased Activity Tolerance  Decreased Balance  Decreased Gait Ability  Decreased Safety Awareness  Decreased Strength  Decreased Transfer Abilities   INTERVENTIONS PLANNED:  (Benefits and precautions of occupational therapy have been discussed with the patient.)  Self Care Training  Therapeutic Activity  Therapeutic Exercise/HEP  Neuromuscular Re-education  Manual Therapy  Education         TREATMENT:     EVALUATION: MODERATE COMPLEXITY: (Untimed Charge)    TREATMENT:   Therapeutic Activity (15 Minutes): Patient participated in therapeutic activities including functional transfer training, adaptive equipment training, sitting tolerance activity, and standing tolerance activity with minimal and moderate assistance, verbal cues, tactile cues, education, and adaptive equipment in order to increase independence, increase safety awareness, increase activity tolerance, increase coordination, and prepare for functional transfers.      TREATMENT GRID:  N/A    AFTER TREATMENT

## 2023-09-29 NOTE — PROGRESS NOTES
Hospitalist Progress Note   Admit Date:  2023  6:22 PM   Name:  Cindy Dee   Age:  68 y.o. Sex:  male  :  1950   MRN:  [id number]   Room:  6/    Presenting/Chief Complaint: No chief complaint on file. Reason(s) for Admission: Bacteremia due to Staphylococcus [R78.81, B95.8]     Hospital Course:   Cindy Dee is a 68 y.o. male with medical history of Hypertension, diet-controlled diabetes mellitus type 2, presented to the ER with right second toe wound. A1c on admission is 7.2. He had been clipping his toenails and accidentally injured his skin. Blood cultures done on admission positive for MRSA. He was admitted for sepsis secondary to MRSA bacteremia. Subjective & 24hr Events:   Patient is complaining of pain in his right knee and has difficulty working with physical therapy even with his offloading boot. Initially plan for discharge today but canceled due to difficulty ambulation. No fever no chills. No chest pain or shortness of breath. Assessment & Plan: This is a 71-year-old male with:    Sepsis secondary to MRSA bacteremia  Diabetic foot ulcer with osteomyelitis/right second toe infection. Status post amputation of the right second toe postop day 1  Blood cultures on admission positive for MRSA. Repeat blood cultures negative. Echocardiogram shows LVEF 50-55%. No visible vegetations. ID consulted. Appreciate recommendations. PICC line ordered. Continue IV Vancomycin for 2 weeks until 10/10/23. Will need prescription for doxycycline 100 mg po bid for another 4 weeks starting 10/11/23 for 4 weeks EOT 23. F/U ID on 23 at 2 pm.   Vascular surgery on board. Appreciate recommendations. Patient underwent right second toe amputation . Diabetes mellitus type 2  Admission A1c 7.2. Sliding scale Insulin. Acute kidney injury resolved  Creatinine is 1.08 yesterday. Repeat BMP in AM.   Avoid nephrotoxic medications.   Monitor

## 2023-09-29 NOTE — PROGRESS NOTES
Occupational Therapy Note:    Attempted to see patient this AM for occupational therapy evaluation session. Patient has not received an offloading shoe, however  has put in a consult for shoe. Will hold evaluation until shoe arrives.   Thank you,    Luba Arboleda

## 2023-09-29 NOTE — DISCHARGE SUMMARY
Hospitalist Discharge Summary   Admit Date:  2023  6:22 PM   DC Note date: 2023  Name:  Governor Hall   Age:  68 y.o. Sex:  male  :  1950   MRN:  [id number]   Room:  [facility]  PCP:  Razia Mckeon MD    Presenting Complaint: No chief complaint on file. Initial Admission Diagnosis: Bacteremia due to Staphylococcus [R78.81, B95.8]     Problem List for this Hospitalization (present on admission):    Principal Problem:    Bacteremia due to Staphylococcus  Active Problems:    ESRD (end stage renal disease) ([facility])  Resolved Problems:    * No resolved hospital problems. [facility] Course: This is a 68 y.o. male with medical history of Hypertension, diet-controlled diabetes mellitus type 2, presented to the ER with right second toe wound. A1c on admission is 7.2. He had been clipping his toenails and accidentally injured his skin. Blood cultures done on admission positive for MRSA. He was admitted for sepsis secondary to MRSA bacteremia. He has diabetic foot ulcer with osteomyelitis/right second toe infection. He underwent amputation of the right second toe Dr. Amador Alexander vascular surgery on 2023. ID was consulted due to positive blood cultures. Echocardiogram was done which showed left ventricular ejection fraction of 50 to 55% with no visible vegetations. ID recommends IV vancomycin for 2 weeks from negative blood cultures until end of therapy 10/10/2023. Repeat blood cultures were done on 2023 and are negative. PICC line was placed today . He was given prescription for doxycycline to start on 10/10/2023 for 4 weeks with end of therapy 2023. He has ID follow-up scheduled on 2023 at 2 PM.  Vascular surgery recommends wound VAC and offloading boot. Wound VAC as well as IV antibiotics were arranged by case management prior to discharge. Acute kidney injury likely prerenal on admission resolved.   Other chronic medical problems which are stable

## 2023-09-29 NOTE — PROGRESS NOTES
ACUTE PHYSICAL THERAPY GOALS:   (Developed with and agreed upon by patient and/or caregiver.)  Pt will perform bed mobility with ind in 7 therapy sessions. Pt will perform sit-to-stand/ stand-to-sit transfers Katrina in 7 therapy sessions. Pt will ambulate 125 ft SBA with use of LRAD/no device and breaks as needed in 7 therapy sessions. Pt will perform standing dynamic balance activities with minimal postural sway in 7 therapy sessions. Pt will tolerate multiple sets and reps of BLE exercises in 7 therapy sessions. PHYSICAL THERAPY Initial Assessment  (Link to Caseload Tracking: PT Visit Days : 1  Acknowledge Orders  Time In/Out  PT Charge Capture  Rehab Caseload Tracker    Offloading shoe DARIAN Squires is a 68 y.o. male   PRIMARY DIAGNOSIS: Bacteremia due to Staphylococcus  Bacteremia due to Staphylococcus [R78.81, B95.8]  Procedure(s) (LRB):  RIGHT SECOND TOE AMPUTATION (Right)  2 Days Post-Op  Reason for Referral: Other abnormalities of gait and mobility (R26.89)  Inpatient: Payor: Yunior Trivedi / Plan: [address],[facility] [place] / Product Type: *No Product type* /     ASSESSMENT:     REHAB RECOMMENDATIONS:   Recommendation to date pending progress:  Setting:  Short-term Rehab - could progress to [facility] pending progress    Equipment:    To Be Determined     ASSESSMENT:  Mr. Sunny Larry is a 68 y.o. male presenting to PT following a hospitalization due to a R second toe amputation. Pt was given a RLE offloading shoe, which he wore throughout the entirety of today's eval. At baseline, pt ambulates with no AD and lives with his spouse who he takes care of per pt and family. At time of initial evaluation, pt presents below baseline LOF with deficits in bed mobility, strength, transfers, balance, gait and activity tolerance limiting overall functional mobility. During ambulation of 13' with a RW CGA, pt presents with R antalgic gait decreased farzaneh.  Despite these gait abnormalities, pt successfully Fall Risk, Weight Bearing  Right Lower Extremity Weight Bearing:  (offloading shoe)              GROSS EVALUATION: Intact Impaired (Comments):   AROM []  Limited RLE ROM due to pain and edema   PROM [] NT   Strength []  Generalized weakness   Balance []  Pt in need of AD during dynamic standing activities for balance    Posture [] N/A   Sensation [x]     Coordination [x]      Tone [] NT   Edema [] RLE edematous    Activity Tolerance []  Below baseline, reliant on offloading boot and AD and limited by pain    []      COGNITION/  PERCEPTION: Intact Impaired (Comments):   Orientation [x]     Vision [x]     Hearing [x]     Cognition  [x]       MOBILITY: I Mod I S SBA CGA Min Mod Max Total  NT x2 Comments:   Bed Mobility    Rolling [] [] [] [] [] [] [] [] [] [x] [] Pt seated upon arrival   Supine to Sit [] [] [] [] [] [] [] [] [] [x] []    Scooting [] [] [] [] [] [] [] [] [] [x] []    Sit to Supine [] [] [] [] [] [] [] [] [] [x] []    Transfers    Sit to Stand [] [] [] [] [] [x] [] [] [] [] [] Difficulty due to pain and RLE sole thickness, increasing RLE knee flexion   Bed to Chair [] [] [] [] [] [] [] [] [] [x] []    Stand to Sit [] [] [] [] [] [x] [] [] [] [] []     [] [] [] [] [] [] [] [] [] [] []    I=Independent, Mod I=Modified Independent, S=Supervision, SBA=Standby Assistance, CGA=Contact Guard Assistance,   Min=Minimal Assistance, Mod=Moderate Assistance, Max=Maximal Assistance, Total=Total Assistance, NT=Not Tested    GAIT: I Mod I S SBA CGA Min Mod Max Total  NT x2 Comments:   Level of Assistance [] [] [] [] [x] [] [] [] [] [] [] Hyperflexed R knee due to offloading boot's sole thickness, increasing R knee flexion and increasing R knee discomfort   Distance 15  feet    DME Rolling Walker and RLE offloading boot    Gait Quality Antalgic, Decreased farzaneh , Decreased step clearance, Decreased step length, and Wide base of support    Weightbearing Status      Stairs      I=Independent, Mod I=Modified Independent,

## 2023-09-29 NOTE — CARE COORDINATION
needs known: Good   Family able to assist with home care needs: Yes   Would you like for me to discuss the discharge plan with any other family members/significant others, and if so, who? No   Financial Resources Medicare   Community Resources None   Social/Functional History   Lives With Spouse   Receives Help From Family   ADL Assistance Independent   Homemaking Assistance Independent   Ambulation Assistance Needs assistance   Transfer Assistance Independent   Occupation Full time employment   Discharge Planning   Type of Residence House   Living Arrangements Spouse/Significant Other   Current Services Prior To Admission None   Potential Assistance Needed Home Care; Outpatient IV;Durable Medical Equipment  (Home infusion through [facility]; wound vac)   Potential DME Needed Wound Vac   DME Ordered? Wound Vac   Potential Assistance Purchasing Medications No   Type of Home Care Services PT;Nursing Services   Patient expects to be discharged to: House   Follow Up Appointment: Best Day/Time  Monday AM   One/Two Story Residence One story   History of falls? 0   Services At/After Discharge   Transition of Care Consult (CM Consult) Home Health;DME/Supply Assistance; Other  (home infusion)   Services At/After Discharge Home Health;PT;Nursing services;IV Therapy   [place] Resource Information Provided? No   Mode of Transport at Discharge Other (see comment)  (family)   Confirm Follow Up Transport Family   Condition of Participation: Discharge Planning   The Plan for Transition of Care is related to the following treatment goals: Home health nursing for wound vac  dressing changes and HH PT to improve gait and mobility s/p toe amputation. The Patient and/or Patient Representative was provided with a Choice of Provider? Patient   The Patient and/Or Patient Representative agree with the Discharge Plan?  Yes   Freedom of Choice list was provided with basic dialogue that supports the patient's individualized plan of care/goals, treatment preferences, and shares the quality data associated with the providers?   Yes

## 2023-09-30 LAB
ANION GAP SERPL CALC-SCNC: 7 MMOL/L (ref 2–11)
BACTERIA SPEC CULT: ABNORMAL
BASOPHILS # BLD: 0.1 K/UL (ref 0–0.2)
BASOPHILS NFR BLD: 1 % (ref 0–2)
BUN SERPL-MCNC: 28 MG/DL (ref 8–23)
CALCIUM SERPL-MCNC: 8.5 MG/DL (ref 8.3–10.4)
CHLORIDE SERPL-SCNC: 107 MMOL/L (ref 101–110)
CO2 SERPL-SCNC: 24 MMOL/L (ref 21–32)
CREAT SERPL-MCNC: 1 MG/DL (ref 0.8–1.5)
DIFFERENTIAL METHOD BLD: ABNORMAL
EOSINOPHIL # BLD: 0.3 K/UL (ref 0–0.8)
EOSINOPHIL NFR BLD: 3 % (ref 0.5–7.8)
ERYTHROCYTE [DISTWIDTH] IN BLOOD BY AUTOMATED COUNT: 13.2 % (ref 11.9–14.6)
GLUCOSE BLD STRIP.AUTO-MCNC: 139 MG/DL (ref 65–100)
GLUCOSE BLD STRIP.AUTO-MCNC: 174 MG/DL (ref 65–100)
GLUCOSE BLD STRIP.AUTO-MCNC: 175 MG/DL (ref 65–100)
GLUCOSE SERPL-MCNC: 142 MG/DL (ref 65–100)
GRAM STN SPEC: ABNORMAL
HCT VFR BLD AUTO: 29.7 % (ref 41.1–50.3)
HGB BLD-MCNC: 9.6 G/DL (ref 13.6–17.2)
IMM GRANULOCYTES # BLD AUTO: 0.1 K/UL (ref 0–0.5)
IMM GRANULOCYTES NFR BLD AUTO: 1 % (ref 0–5)
LYMPHOCYTES # BLD: 2 K/UL (ref 0.5–4.6)
LYMPHOCYTES NFR BLD: 23 % (ref 13–44)
MCH RBC QN AUTO: 28 PG (ref 26.1–32.9)
MCHC RBC AUTO-ENTMCNC: 32.3 G/DL (ref 31.4–35)
MCV RBC AUTO: 86.6 FL (ref 82–102)
MONOCYTES # BLD: 0.9 K/UL (ref 0.1–1.3)
MONOCYTES NFR BLD: 10 % (ref 4–12)
NEUTS SEG # BLD: 5.4 K/UL (ref 1.7–8.2)
NEUTS SEG NFR BLD: 62 % (ref 43–78)
NRBC # BLD: 0 K/UL (ref 0–0.2)
PLATELET # BLD AUTO: 357 K/UL (ref 150–450)
PMV BLD AUTO: 10.2 FL (ref 9.4–12.3)
POTASSIUM SERPL-SCNC: 4 MMOL/L (ref 3.5–5.1)
RBC # BLD AUTO: 3.43 M/UL (ref 4.23–5.6)
SERVICE CMNT-IMP: ABNORMAL
SODIUM SERPL-SCNC: 138 MMOL/L (ref 133–143)
WBC # BLD AUTO: 8.8 K/UL (ref 4.3–11.1)

## 2023-09-30 PROCEDURE — 6370000000 HC RX 637 (ALT 250 FOR IP): Performed by: SURGERY

## 2023-09-30 PROCEDURE — 99024 POSTOP FOLLOW-UP VISIT: CPT | Performed by: SURGERY

## 2023-09-30 PROCEDURE — 80048 BASIC METABOLIC PNL TOTAL CA: CPT

## 2023-09-30 PROCEDURE — 6360000002 HC RX W HCPCS: Performed by: HOSPITALIST

## 2023-09-30 PROCEDURE — 2580000003 HC RX 258: Performed by: SURGERY

## 2023-09-30 PROCEDURE — 6360000002 HC RX W HCPCS: Performed by: SURGERY

## 2023-09-30 PROCEDURE — 2580000003 HC RX 258: Performed by: HOSPITALIST

## 2023-09-30 PROCEDURE — 2500000003 HC RX 250 WO HCPCS: Performed by: HOSPITALIST

## 2023-09-30 PROCEDURE — 1100000000 HC RM PRIVATE

## 2023-09-30 PROCEDURE — 85025 COMPLETE CBC W/AUTO DIFF WBC: CPT

## 2023-09-30 PROCEDURE — 82962 GLUCOSE BLOOD TEST: CPT

## 2023-09-30 PROCEDURE — 36415 COLL VENOUS BLD VENIPUNCTURE: CPT

## 2023-09-30 PROCEDURE — 97530 THERAPEUTIC ACTIVITIES: CPT

## 2023-09-30 RX ORDER — OXYCODONE HYDROCHLORIDE AND ACETAMINOPHEN 5; 325 MG/1; MG/1
1 TABLET ORAL EVERY 6 HOURS PRN
Status: DISCONTINUED | OUTPATIENT
Start: 2023-09-30 | End: 2023-10-05 | Stop reason: HOSPADM

## 2023-09-30 RX ADMIN — SODIUM CHLORIDE, PRESERVATIVE FREE 10 ML: 5 INJECTION INTRAVENOUS at 09:10

## 2023-09-30 RX ADMIN — ENOXAPARIN SODIUM 40 MG: 100 INJECTION SUBCUTANEOUS at 21:02

## 2023-09-30 RX ADMIN — LISINOPRIL 20 MG: 20 TABLET ORAL at 21:03

## 2023-09-30 RX ADMIN — VANCOMYCIN HYDROCHLORIDE 1000 MG: 1 INJECTION, POWDER, LYOPHILIZED, FOR SOLUTION INTRAVENOUS at 14:23

## 2023-09-30 RX ADMIN — ACETAMINOPHEN 650 MG: 325 TABLET ORAL at 02:22

## 2023-09-30 RX ADMIN — VANCOMYCIN HYDROCHLORIDE 1000 MG: 1 INJECTION, POWDER, LYOPHILIZED, FOR SOLUTION INTRAVENOUS at 02:20

## 2023-09-30 RX ADMIN — Medication 1 AMPULE: at 21:02

## 2023-09-30 RX ADMIN — Medication 1 AMPULE: at 11:19

## 2023-09-30 RX ADMIN — TUBERCULIN PURIFIED PROTEIN DERIVATIVE 5 UNITS: 5 INJECTION, SOLUTION INTRADERMAL at 13:07

## 2023-09-30 RX ADMIN — ACETAMINOPHEN 650 MG: 325 TABLET ORAL at 15:02

## 2023-09-30 RX ADMIN — ACETAMINOPHEN 650 MG: 325 TABLET ORAL at 21:06

## 2023-09-30 RX ADMIN — AMLODIPINE BESYLATE 5 MG: 5 TABLET ORAL at 21:03

## 2023-09-30 ASSESSMENT — PAIN DESCRIPTION - DESCRIPTORS
DESCRIPTORS: ACHING
DESCRIPTORS: ACHING;SORE
DESCRIPTORS: DISCOMFORT

## 2023-09-30 ASSESSMENT — PAIN DESCRIPTION - ORIENTATION
ORIENTATION: RIGHT;POSTERIOR
ORIENTATION: RIGHT
ORIENTATION: RIGHT;POSTERIOR

## 2023-09-30 ASSESSMENT — PAIN SCALES - GENERAL
PAINLEVEL_OUTOF10: 3
PAINLEVEL_OUTOF10: 3
PAINLEVEL_OUTOF10: 0
PAINLEVEL_OUTOF10: 3

## 2023-09-30 ASSESSMENT — PAIN DESCRIPTION - LOCATION
LOCATION: FOOT
LOCATION: KNEE
LOCATION: KNEE

## 2023-09-30 ASSESSMENT — PAIN - FUNCTIONAL ASSESSMENT: PAIN_FUNCTIONAL_ASSESSMENT: PREVENTS OR INTERFERES SOME ACTIVE ACTIVITIES AND ADLS

## 2023-09-30 NOTE — PROGRESS NOTES
Patient seen and examined. No acute issues overnight. Wound VAC in place. Patient planned to be discharged home will have advanced prostatic come by today to offer another option of offloading shoe to the right foot. Patient will see back in office in 2 weeks for postop check.     Jimena Hillman

## 2023-09-30 NOTE — PROGRESS NOTES
Hospitalist Progress Note   Admit Date:  2023  6:22 PM   Name:  Jamila Rodriguez   Age:  68 y.o. Sex:  male  :  1950   MRN:  [id number]   Room:  [facility]/    Presenting/Chief Complaint: No chief complaint on file. Reason(s) for Admission: Bacteremia due to Staphylococcus [R78.81, B95.8]     Hospital Course:   Jamila Rodriguez is a 68 y.o. male with medical history of Hypertension, diet-controlled diabetes mellitus type 2, presented to the ER with right second toe wound. A1c on admission is 7.2. He had been clipping his toenails and accidentally injured his skin. Blood cultures done on admission positive for MRSA. He was admitted for sepsis secondary to MRSA bacteremia. He has diabetic foot ulcer with osteomyelitis/right second toe infection. He underwent amputation of the right second toe Dr. Jamie Taylor vascular surgery on 2023. ID was consulted due to positive blood cultures. Echocardiogram was done which showed left ventricular ejection fraction of 50 to 55% with no visible vegetations. ID recommends IV vancomycin for 2 weeks from negative blood cultures until end of therapy 10/10/2023. Repeat blood cultures were done on 2023 and are negative. PICC line was placed today . He was given prescription for doxycycline to start on 10/10/2023 for 4 weeks with end of therapy 2023. He has ID follow-up scheduled on 2023 at 2 PM.  Vascular surgery recommends wound VAC and offloading boot. Wound VAC as well as IV antibiotics were arranged by case management prior to discharge. Acute kidney injury likely prerenal on admission resolved. Other chronic medical problems which are stable included hypertension, diabetes mellitus type 2. Initially, was planning on discharging patient home with home health services today but family very concerned due to difficult ambulation. Case management on board. Rehab choices provided to the patient.     Subjective & 24hr Events: 09/29/23  5:02 AM   Result Value Ref Range    Vancomycin Rm 23.6 UG/ML   CBC with Auto Differential    Collection Time: 09/29/23  5:02 AM   Result Value Ref Range    WBC 11.6 (H) 4.3 - 11.1 K/uL    RBC 3.44 (L) 4.23 - 5.6 M/uL    Hemoglobin 9.5 (L) 13.6 - 17.2 g/dL    Hematocrit 29.6 (L) 41.1 - 50.3 %    MCV 86.0 82 - 102 FL    MCH 27.6 26.1 - 32.9 PG    MCHC 32.1 31.4 - 35.0 g/dL    RDW 13.2 11.9 - 14.6 %    Platelets 631 354 - 553 K/uL    MPV 10.2 9.4 - 12.3 FL    nRBC 0.00 0.0 - 0.2 K/uL    Differential Type AUTOMATED      Neutrophils % 73 43 - 78 %    Lymphocytes % 14 13 - 44 %    Monocytes % 9 4.0 - 12.0 %    Eosinophils % 2 0.5 - 7.8 %    Basophils % 1 0.0 - 2.0 %    Immature Granulocytes 1 0.0 - 5.0 %    Neutrophils Absolute 8.5 (H) 1.7 - 8.2 K/UL    Lymphocytes Absolute 1.7 0.5 - 4.6 K/UL    Monocytes Absolute 1.1 0.1 - 1.3 K/UL    Eosinophils Absolute 0.2 0.0 - 0.8 K/UL    Basophils Absolute 0.1 0.0 - 0.2 K/UL    Absolute Immature Granulocyte 0.1 0.0 - 0.5 K/UL   Basic Metabolic Panel w/ Reflex to MG    Collection Time: 09/29/23  5:02 AM   Result Value Ref Range    Sodium 137 133 - 143 mmol/L    Potassium 4.5 3.5 - 5.1 mmol/L    Chloride 106 101 - 110 mmol/L    CO2 24 21 - 32 mmol/L    Anion Gap 7 2 - 11 mmol/L    Glucose 182 (H) 65 - 100 mg/dL    BUN 24 (H) 8 - 23 MG/DL    Creatinine 0.90 0.8 - 1.5 MG/DL    Est, Glom Filt Rate >60 >60 ml/min/1.73m2    Calcium 8.4 8.3 - 10.4 MG/DL   POCT Glucose    Collection Time: 09/29/23  6:27 AM   Result Value Ref Range    POC Glucose 196 (H) 65 - 100 mg/dL    Performed by: Be    POCT Glucose    Collection Time: 09/29/23 11:35 AM   Result Value Ref Range    POC Glucose 184 (H) 65 - 100 mg/dL    Performed by: Syl    POCT Glucose    Collection Time: 09/29/23  3:54 PM   Result Value Ref Range    POC Glucose 242 (H) 65 - 100 mg/dL    Performed by: Syl    POCT Glucose    Collection Time: 09/29/23  8:44 PM   Result Value Ref

## 2023-09-30 NOTE — CARE COORDINATION
Discharge order is in. Pt is discharging home today in stable condition. Primary nurse charged pts wound vac and will plan to swap out the hospitals for the pts at discharge. Advised Carmen ([phone]) with [facility] of pts discharge. Interim HH arranged, referral sent for RN/PT/OT. Tonya Cuenca arranged for 1400 . No other discharge needs identified. Tx goals met. 1223-DISCHARGE HELD. Pt wants to go to STR now. Pt reported that his family is concerned about help at home and would be better served going to rehab to regain his strength. Updated MD. Will provide pt a STR List that is in network with [facility]. Explained the referral and transfer process to him and that rehab is typically 10-13 days, clarified that it is not 2-3 day. Reported understanding. Cancelled Ambo, updated primary nurse. 1243-Primary nurse ordering new PPD as it appears that it was never given on 9/24/23.

## 2023-09-30 NOTE — DISCHARGE SUMMARY
Hospitalist Discharge Summary   Admit Date:  2023  6:22 PM   DC Note date: 2023  Name:  Valdo Diaz   Age:  68 y.o. Sex:  male  :  1950   MRN:  [id number]   Room:  [facility]  PCP:  Kenrick Arguello MD    Presenting Complaint: No chief complaint on file. Initial Admission Diagnosis: Bacteremia due to Staphylococcus [R78.81, B95.8]     Problem List for this Hospitalization (present on admission):    Principal Problem:    Bacteremia due to Staphylococcus  Active Problems:    ESRD (end stage renal disease) ([address])  Resolved Problems:    * No resolved hospital problems. [facility] Course: This is a 68 y.o. male with medical history of Hypertension, diet-controlled diabetes mellitus type 2, presented to the ER with right second toe wound. A1c on admission is 7.2. He had been clipping his toenails and accidentally injured his skin. Blood cultures done on admission positive for MRSA. He was admitted for sepsis secondary to MRSA bacteremia. He has diabetic foot ulcer with osteomyelitis/right second toe infection. He underwent amputation of the right second toe Dr. Tete Stevenson vascular surgery on 2023. ID was consulted due to positive blood cultures. Echocardiogram was done which showed left ventricular ejection fraction of 50 to 55% with no visible vegetations. ID recommends IV vancomycin for 2 weeks from negative blood cultures until end of therapy 10/10/2023. Repeat blood cultures were done on 2023 and are negative. PICC line was placed today . He was given prescription for doxycycline to start on 10/10/2023 for 4 weeks with end of therapy 2023. He has ID follow-up scheduled on 2023 at 2 PM.  Vascular surgery recommends wound VAC and offloading boot. Wound VAC as well as IV antibiotics were arranged by case management prior to discharge. Acute kidney injury likely prerenal on admission resolved.   Other chronic medical problems which are stable TO PHARMACY    Echocardiogram results:  09/26/23    ECHO (TTE) COMPLETE (CONTRAST/BUBBLE/3D PRN) 09/27/2023 12:44 PM (Final)    Interpretation Summary    Left Ventricle: Normal left ventricular systolic function with a visually estimated EF of 50 - 55%. Left ventricle size is normal. Normal wall thickness. Normal wall motion. Normal diastolic function. Tricuspid Valve: The estimated RVSP is 29 mmHg. Contrast used: Definity. Signed by: Batsheva San MD on 9/27/2023 12:44 PM      Diagnostic Imaging/Tests:   MRI FOOT RIGHT WO CONTRAST    Result Date: 9/25/2023  1. Osteomyelitis of the second middle and distal phalanges. Discordant marrow edema of the second proximal phalanx not compatible with osteomyelitis and differential including early osteomyelitis and reactive osteitis. 2.  Probable circumferential collection about the second toe proximal and middle phalanges     Vascular duplex lower extremity arteries bilateral with JAQUAN    Result Date: 9/25/2023  Moderate distal right popliteal artery stenosis. Decreased ankle brachial index and great toe pressure compared to the left extremity     XR FOOT RIGHT (MIN 3 VIEWS)    Result Date: 9/24/2023  1. Destructive change of the right second distal phalanx. Findings suspicious for osteomyelitis. 2. Osteoarthritic change involving the right foot.        Labs: Results:       BMP, Mg, Phos Recent Labs     09/28/23  0543 09/29/23  0502 09/30/23  0645    137 138   K 4.0 4.5 4.0    106 107   CO2 24 24 24   ANIONGAP 8 7 7   BUN 21 24* 28*   CREATININE 1.00 0.90 1.00   LABGLOM >60 >60 >60   CALCIUM 8.6 8.4 8.5   GLUCOSE 143* 182* 142*      CBC @LABRCNT(WBC:3,RBC:3,HGB:3,HCT:3,MCV:3,MCH:3,MCHC:3,RDW:3,PLT:3,MPV:3,NRBC:3,SEGS:3,LYMPHOPCT:3,EOSRELPCT:3,MONOPCT:3,BASOPCT:3,IMMGRAN:3,SEGSABS:3,LYMPHSABS:3,EOSABS:3,MONOSABS:3,BASOSABS:3,ABSIMMGRAN:3)@   LFT No results for input(s): \"BILITOT\", \"BILIDIR\", \"ALKPHOS\", \"AST\", \"ALT\", \"PROT\", \"LABALBU\", \"GLOB\" in the last

## 2023-09-30 NOTE — PROGRESS NOTES
ACUTE OCCUPATIONAL THERAPY GOALS:   (Developed with and agreed upon by patient and/or caregiver.)  1. Patient will complete lower body bathing and dressing with STAND BY ASSIST and adaptive equipment as needed. 2. Patient will complete toileting with STAND BY ASSIST. 3. Patient will complete grooming ADL standing at sink with STAND BY ASSIST. 4. Patient will tolerate 25 minutes of OT treatment with 1-2 rest breaks to increase activity tolerance for ADLs. 5. Patient will complete functional transfers with STAND BY ASSIST and adaptive equipment as needed. 6. Patient will tolerate 10 minutes BUE exercises to increase strength for safe, functional transfers. Timeframe: 7 visits        OCCUPATIONAL THERAPY: Daily Note PM   OT Visit Days: 2   Time In/Out  OT Charge Capture  Rehab Caseload Tracker  OT Orders    Eber Sykes is a 68 y.o. male   PRIMARY DIAGNOSIS: Bacteremia due to Staphylococcus  Bacteremia due to Staphylococcus [R78.81, B95.8]  Procedure(s) (LRB):  RIGHT SECOND TOE AMPUTATION (Right)  3 Days Post-Op  Inpatient: Payor: Alannah Barney / Plan: [facility] / Product Type: *No Product type* /     ASSESSMENT:     REHAB RECOMMENDATIONS: CURRENT LEVEL OF FUNCTION:  (Most Recently Demonstrated)   Recommendation to date pending progress:  Setting:  Short-term Rehab    Equipment:    To Be Determined Bathing:  Not Tested  Dressing:  Minimal Assist for boot  Feeding/Grooming:  Not Tested  Toileting:  Not Tested  Functional Mobility:  Contact Guard Assist     ASSESSMENT:  Mr. Nicole Sanderson is doing fair today. Pt presents sitting up in chair upon arrival. Pt was able to perform functional mobility in hallway with one seated rest break. Pt became a little lightheaded. BP checked. WNL. Pt returned to room and left in chair with all needs in reach. Making some progress with goals. Will continue to benefit from skilled OT during stay.        SUBJECTIVE:     Mr. Nicole Sanderson states, \"I do a

## 2023-10-01 LAB
ANION GAP SERPL CALC-SCNC: 8 MMOL/L (ref 2–11)
BASOPHILS # BLD: 0.1 K/UL (ref 0–0.2)
BASOPHILS NFR BLD: 1 % (ref 0–2)
BUN SERPL-MCNC: 25 MG/DL (ref 8–23)
CALCIUM SERPL-MCNC: 8.5 MG/DL (ref 8.3–10.4)
CHLORIDE SERPL-SCNC: 107 MMOL/L (ref 101–110)
CO2 SERPL-SCNC: 25 MMOL/L (ref 21–32)
CREAT SERPL-MCNC: 1.1 MG/DL (ref 0.8–1.5)
DIFFERENTIAL METHOD BLD: ABNORMAL
EOSINOPHIL # BLD: 0.3 K/UL (ref 0–0.8)
EOSINOPHIL NFR BLD: 3 % (ref 0.5–7.8)
ERYTHROCYTE [DISTWIDTH] IN BLOOD BY AUTOMATED COUNT: 13.2 % (ref 11.9–14.6)
GLUCOSE BLD STRIP.AUTO-MCNC: 145 MG/DL (ref 65–100)
GLUCOSE BLD STRIP.AUTO-MCNC: 157 MG/DL (ref 65–100)
GLUCOSE BLD STRIP.AUTO-MCNC: 158 MG/DL (ref 65–100)
GLUCOSE BLD STRIP.AUTO-MCNC: 162 MG/DL (ref 65–100)
GLUCOSE SERPL-MCNC: 151 MG/DL (ref 65–100)
HCT VFR BLD AUTO: 30.5 % (ref 41.1–50.3)
HGB BLD-MCNC: 9.8 G/DL (ref 13.6–17.2)
IMM GRANULOCYTES # BLD AUTO: 0.1 K/UL (ref 0–0.5)
IMM GRANULOCYTES NFR BLD AUTO: 1 % (ref 0–5)
LYMPHOCYTES # BLD: 2.1 K/UL (ref 0.5–4.6)
LYMPHOCYTES NFR BLD: 22 % (ref 13–44)
MCH RBC QN AUTO: 27.7 PG (ref 26.1–32.9)
MCHC RBC AUTO-ENTMCNC: 32.1 G/DL (ref 31.4–35)
MCV RBC AUTO: 86.2 FL (ref 82–102)
MM INDURATION, POC: 0 MM (ref 0–5)
MONOCYTES # BLD: 0.8 K/UL (ref 0.1–1.3)
MONOCYTES NFR BLD: 9 % (ref 4–12)
NEUTS SEG # BLD: 6.4 K/UL (ref 1.7–8.2)
NEUTS SEG NFR BLD: 64 % (ref 43–78)
NRBC # BLD: 0 K/UL (ref 0–0.2)
PLATELET # BLD AUTO: 431 K/UL (ref 150–450)
PMV BLD AUTO: 9.9 FL (ref 9.4–12.3)
POTASSIUM SERPL-SCNC: 4.2 MMOL/L (ref 3.5–5.1)
PPD, POC: NEGATIVE
RBC # BLD AUTO: 3.54 M/UL (ref 4.23–5.6)
SERVICE CMNT-IMP: ABNORMAL
SODIUM SERPL-SCNC: 140 MMOL/L (ref 133–143)
WBC # BLD AUTO: 9.8 K/UL (ref 4.3–11.1)

## 2023-10-01 PROCEDURE — 6370000000 HC RX 637 (ALT 250 FOR IP): Performed by: SURGERY

## 2023-10-01 PROCEDURE — 82962 GLUCOSE BLOOD TEST: CPT

## 2023-10-01 PROCEDURE — 97530 THERAPEUTIC ACTIVITIES: CPT

## 2023-10-01 PROCEDURE — 85025 COMPLETE CBC W/AUTO DIFF WBC: CPT

## 2023-10-01 PROCEDURE — 1100000000 HC RM PRIVATE

## 2023-10-01 PROCEDURE — 6360000002 HC RX W HCPCS: Performed by: SURGERY

## 2023-10-01 PROCEDURE — 2580000003 HC RX 258: Performed by: SURGERY

## 2023-10-01 PROCEDURE — 99024 POSTOP FOLLOW-UP VISIT: CPT | Performed by: SURGERY

## 2023-10-01 PROCEDURE — 80048 BASIC METABOLIC PNL TOTAL CA: CPT

## 2023-10-01 PROCEDURE — 6360000002 HC RX W HCPCS: Performed by: HOSPITALIST

## 2023-10-01 PROCEDURE — 2580000003 HC RX 258: Performed by: HOSPITALIST

## 2023-10-01 RX ADMIN — VANCOMYCIN HYDROCHLORIDE 1000 MG: 1 INJECTION, POWDER, LYOPHILIZED, FOR SOLUTION INTRAVENOUS at 14:38

## 2023-10-01 RX ADMIN — AMLODIPINE BESYLATE 5 MG: 5 TABLET ORAL at 17:42

## 2023-10-01 RX ADMIN — SODIUM CHLORIDE, PRESERVATIVE FREE 10 ML: 5 INJECTION INTRAVENOUS at 09:45

## 2023-10-01 RX ADMIN — Medication 1 AMPULE: at 21:26

## 2023-10-01 RX ADMIN — ACETAMINOPHEN 650 MG: 325 TABLET ORAL at 10:46

## 2023-10-01 RX ADMIN — LISINOPRIL 20 MG: 20 TABLET ORAL at 17:42

## 2023-10-01 RX ADMIN — VANCOMYCIN HYDROCHLORIDE 1000 MG: 1 INJECTION, POWDER, LYOPHILIZED, FOR SOLUTION INTRAVENOUS at 02:37

## 2023-10-01 RX ADMIN — SODIUM CHLORIDE, PRESERVATIVE FREE 10 ML: 5 INJECTION INTRAVENOUS at 21:27

## 2023-10-01 RX ADMIN — ACETAMINOPHEN 650 MG: 325 TABLET ORAL at 16:02

## 2023-10-01 RX ADMIN — ENOXAPARIN SODIUM 40 MG: 100 INJECTION SUBCUTANEOUS at 21:26

## 2023-10-01 ASSESSMENT — PAIN SCALES - GENERAL
PAINLEVEL_OUTOF10: 3
PAINLEVEL_OUTOF10: 0
PAINLEVEL_OUTOF10: 3

## 2023-10-01 ASSESSMENT — PAIN DESCRIPTION - LOCATION: LOCATION: KNEE

## 2023-10-01 ASSESSMENT — PAIN DESCRIPTION - ONSET: ONSET: GRADUAL

## 2023-10-01 ASSESSMENT — PAIN DESCRIPTION - DESCRIPTORS
DESCRIPTORS: DISCOMFORT
DESCRIPTORS: DISCOMFORT

## 2023-10-01 ASSESSMENT — PAIN DESCRIPTION - PAIN TYPE: TYPE: ACUTE PAIN;SURGICAL PAIN

## 2023-10-01 ASSESSMENT — PAIN DESCRIPTION - FREQUENCY: FREQUENCY: INTERMITTENT

## 2023-10-01 ASSESSMENT — PAIN DESCRIPTION - ORIENTATION
ORIENTATION: RIGHT
ORIENTATION: RIGHT

## 2023-10-01 ASSESSMENT — PAIN SCALES - WONG BAKER: WONGBAKER_NUMERICALRESPONSE: 0

## 2023-10-01 NOTE — PROGRESS NOTES
Patient seen and examined no acute issues overnight. Family decided to discharge to rehab facility. I will plan to remove wound VAC tomorrow morning to evaluate amputation site. I will ask wound care nurse to place the [facility] on it later on tomorrow. Patient can ambulate with offloading shoe.     Ulysses Mccoy

## 2023-10-01 NOTE — PROGRESS NOTES
ACUTE OCCUPATIONAL THERAPY GOALS:   (Developed with and agreed upon by patient and/or caregiver.)  1. Patient will complete lower body bathing and dressing with STAND BY ASSIST and adaptive equipment as needed. 2. Patient will complete toileting with STAND BY ASSIST. 3. Patient will complete grooming ADL standing at sink with STAND BY ASSIST. 4. Patient will tolerate 25 minutes of OT treatment with 1-2 rest breaks to increase activity tolerance for ADLs. 5. Patient will complete functional transfers with STAND BY ASSIST and adaptive equipment as needed. 6. Patient will tolerate 10 minutes BUE exercises to increase strength for safe, functional transfers. Timeframe: 7 visits        OCCUPATIONAL THERAPY: Daily Note AM   OT Visit Days: 3   Time In/Out  OT Charge Capture  Rehab Caseload Tracker  OT Orders    Janes Fine is a 68 y.o. male   PRIMARY DIAGNOSIS: Bacteremia due to Staphylococcus  Bacteremia due to Staphylococcus [R78.81, B95.8]  Procedure(s) (LRB):  RIGHT SECOND TOE AMPUTATION (Right)  4 Days Post-Op  Inpatient: Payor: Levi Barrientoss / Plan: [address] [facility] [place] [facility] / Product Type: *No Product type* /     ASSESSMENT:     REHAB RECOMMENDATIONS: CURRENT LEVEL OF FUNCTION:  (Most Recently Demonstrated)   Recommendation to date pending progress:  Setting:  Short-term Rehab    Equipment:    To Be Determined Bathing:  Not Tested  Dressing:  Minimal Assist for boot  Feeding/Grooming:  Not Tested  Toileting:  Not Tested  Functional Mobility:  Stand by Assist/CGA     ASSESSMENT:  Mr. Norris Hidalgo is doing fair today. Pt presents sitting up in chair upon arrival. Pt was able to perform functional mobility in hallway with one seated rest break. Pt returned to room and left in chair with all needs in reach. Making some progress with goals. Will continue to benefit from skilled OT during stay.        SUBJECTIVE:     Mr. Norris Hidalgo states, \"I just got cleaned up\"     Social/Functional Lives With:

## 2023-10-01 NOTE — CARE COORDINATION
CM met with pt to discuss rehab facility choices. He [facility]. Referrals submitted.

## 2023-10-01 NOTE — PROGRESS NOTES
Result Value Ref Range    WBC 8.8 4.3 - 11.1 K/uL    RBC 3.43 (L) 4.23 - 5.6 M/uL    Hemoglobin 9.6 (L) 13.6 - 17.2 g/dL    Hematocrit 29.7 (L) 41.1 - 50.3 %    MCV 86.6 82 - 102 FL    MCH 28.0 26.1 - 32.9 PG    MCHC 32.3 31.4 - 35.0 g/dL    RDW 13.2 11.9 - 14.6 %    Platelets 586 261 - 858 K/uL    MPV 10.2 9.4 - 12.3 FL    nRBC 0.00 0.0 - 0.2 K/uL    Differential Type AUTOMATED      Neutrophils % 62 43 - 78 %    Lymphocytes % 23 13 - 44 %    Monocytes % 10 4.0 - 12.0 %    Eosinophils % 3 0.5 - 7.8 %    Basophils % 1 0.0 - 2.0 %    Immature Granulocytes 1 0.0 - 5.0 %    Neutrophils Absolute 5.4 1.7 - 8.2 K/UL    Lymphocytes Absolute 2.0 0.5 - 4.6 K/UL    Monocytes Absolute 0.9 0.1 - 1.3 K/UL    Eosinophils Absolute 0.3 0.0 - 0.8 K/UL    Basophils Absolute 0.1 0.0 - 0.2 K/UL    Absolute Immature Granulocyte 0.1 0.0 - 0.5 K/UL   Basic Metabolic Panel w/ Reflex to MG    Collection Time: 09/30/23  6:45 AM   Result Value Ref Range    Sodium 138 133 - 143 mmol/L    Potassium 4.0 3.5 - 5.1 mmol/L    Chloride 107 101 - 110 mmol/L    CO2 24 21 - 32 mmol/L    Anion Gap 7 2 - 11 mmol/L    Glucose 142 (H) 65 - 100 mg/dL    BUN 28 (H) 8 - 23 MG/DL    Creatinine 1.00 0.8 - 1.5 MG/DL    Est, Glom Filt Rate >60 >60 ml/min/1.73m2    Calcium 8.5 8.3 - 10.4 MG/DL   POCT Glucose    Collection Time: 09/30/23 11:41 AM   Result Value Ref Range    POC Glucose 139 (H) 65 - 100 mg/dL    Performed by: Eveline    POCT Glucose    Collection Time: 09/30/23  5:09 PM   Result Value Ref Range    POC Glucose 174 (H) 65 - 100 mg/dL    Performed by: Leigh Ann    POCT Glucose    Collection Time: 09/30/23  8:45 PM   Result Value Ref Range    POC Glucose 175 (H) 65 - 100 mg/dL    Performed by: Mabel    POCT Glucose    Collection Time: 10/01/23  6:29 AM   Result Value Ref Range    POC Glucose 158 (H) 65 - 100 mg/dL    Performed by: Mabel        Current Meds:  Current Facility-Administered Medications Topical Q12H       Signed:  Mellissa Nolen MD    Part of this note may have been written by using a voice dictation software. The note has been proof read but may still contain some grammatical/other typographical errors.

## 2023-10-02 LAB
BACTERIA SPEC CULT: NORMAL
BACTERIA SPEC CULT: NORMAL
GLUCOSE BLD STRIP.AUTO-MCNC: 134 MG/DL (ref 65–100)
GLUCOSE BLD STRIP.AUTO-MCNC: 146 MG/DL (ref 65–100)
GLUCOSE BLD STRIP.AUTO-MCNC: 169 MG/DL (ref 65–100)
GLUCOSE BLD STRIP.AUTO-MCNC: 170 MG/DL (ref 65–100)
MM INDURATION, POC: 0 MM (ref 0–5)
PPD, POC: NEGATIVE
SERVICE CMNT-IMP: ABNORMAL
SERVICE CMNT-IMP: NORMAL
SERVICE CMNT-IMP: NORMAL

## 2023-10-02 PROCEDURE — 6360000002 HC RX W HCPCS: Performed by: HOSPITALIST

## 2023-10-02 PROCEDURE — 2580000003 HC RX 258: Performed by: SURGERY

## 2023-10-02 PROCEDURE — 6370000000 HC RX 637 (ALT 250 FOR IP): Performed by: SURGERY

## 2023-10-02 PROCEDURE — 6360000002 HC RX W HCPCS: Performed by: SURGERY

## 2023-10-02 PROCEDURE — 1100000000 HC RM PRIVATE

## 2023-10-02 PROCEDURE — 2580000003 HC RX 258: Performed by: HOSPITALIST

## 2023-10-02 PROCEDURE — 82962 GLUCOSE BLOOD TEST: CPT

## 2023-10-02 PROCEDURE — 97606 NEG PRS WND THER DME>50 SQCM: CPT

## 2023-10-02 PROCEDURE — 97116 GAIT TRAINING THERAPY: CPT

## 2023-10-02 PROCEDURE — 97530 THERAPEUTIC ACTIVITIES: CPT

## 2023-10-02 PROCEDURE — 99024 POSTOP FOLLOW-UP VISIT: CPT | Performed by: SURGERY

## 2023-10-02 PROCEDURE — 97605 NEG PRS WND THER DME<=50SQCM: CPT

## 2023-10-02 PROCEDURE — 97112 NEUROMUSCULAR REEDUCATION: CPT

## 2023-10-02 RX ADMIN — AMLODIPINE BESYLATE 5 MG: 5 TABLET ORAL at 18:15

## 2023-10-02 RX ADMIN — VANCOMYCIN HYDROCHLORIDE 1000 MG: 1 INJECTION, POWDER, LYOPHILIZED, FOR SOLUTION INTRAVENOUS at 14:03

## 2023-10-02 RX ADMIN — SODIUM CHLORIDE, PRESERVATIVE FREE 10 ML: 5 INJECTION INTRAVENOUS at 09:43

## 2023-10-02 RX ADMIN — ACETAMINOPHEN 650 MG: 325 TABLET ORAL at 23:06

## 2023-10-02 RX ADMIN — VANCOMYCIN HYDROCHLORIDE 1000 MG: 1 INJECTION, POWDER, LYOPHILIZED, FOR SOLUTION INTRAVENOUS at 02:42

## 2023-10-02 RX ADMIN — Medication 1 AMPULE: at 09:43

## 2023-10-02 RX ADMIN — Medication 1 AMPULE: at 21:37

## 2023-10-02 RX ADMIN — SODIUM CHLORIDE, PRESERVATIVE FREE 10 ML: 5 INJECTION INTRAVENOUS at 21:37

## 2023-10-02 RX ADMIN — LISINOPRIL 20 MG: 20 TABLET ORAL at 18:15

## 2023-10-02 RX ADMIN — ENOXAPARIN SODIUM 40 MG: 100 INJECTION SUBCUTANEOUS at 21:36

## 2023-10-02 RX ADMIN — ACETAMINOPHEN 650 MG: 325 TABLET ORAL at 16:54

## 2023-10-02 ASSESSMENT — PAIN - FUNCTIONAL ASSESSMENT
PAIN_FUNCTIONAL_ASSESSMENT: PREVENTS OR INTERFERES SOME ACTIVE ACTIVITIES AND ADLS
PAIN_FUNCTIONAL_ASSESSMENT: ACTIVITIES ARE NOT PREVENTED

## 2023-10-02 ASSESSMENT — PAIN DESCRIPTION - ORIENTATION
ORIENTATION: RIGHT
ORIENTATION: RIGHT

## 2023-10-02 ASSESSMENT — PAIN SCALES - GENERAL
PAINLEVEL_OUTOF10: 0
PAINLEVEL_OUTOF10: 2
PAINLEVEL_OUTOF10: 3
PAINLEVEL_OUTOF10: 0

## 2023-10-02 ASSESSMENT — PAIN DESCRIPTION - LOCATION
LOCATION: FOOT
LOCATION: FOOT

## 2023-10-02 ASSESSMENT — PAIN DESCRIPTION - ONSET
ONSET: GRADUAL
ONSET: GRADUAL

## 2023-10-02 ASSESSMENT — PAIN DESCRIPTION - PAIN TYPE
TYPE: SURGICAL PAIN
TYPE: ACUTE PAIN

## 2023-10-02 ASSESSMENT — PAIN DESCRIPTION - FREQUENCY
FREQUENCY: INTERMITTENT
FREQUENCY: INTERMITTENT

## 2023-10-02 ASSESSMENT — PAIN DESCRIPTION - DESCRIPTORS
DESCRIPTORS: ACHING
DESCRIPTORS: ACHING

## 2023-10-02 NOTE — PROGRESS NOTES
ACUTE PHYSICAL THERAPY GOALS:   (Developed with and agreed upon by patient and/or caregiver.)  Pt will perform bed mobility with ind in 7 therapy sessions. Pt will perform sit-to-stand/ stand-to-sit transfers Katrina in 7 therapy sessions. Pt will ambulate 125 ft SBA with use of LRAD/no device and breaks as needed in 7 therapy sessions. Pt will perform standing dynamic balance activities with minimal postural sway in 7 therapy sessions. Pt will tolerate multiple sets and reps of BLE exercises in 7 therapy sessions. PHYSICAL THERAPY: Daily Note AM   Offloading shoe RLE  (Link to Caseload Tracking: PT Visit Days : 2  Time In/Out PT Charge Capture  Rehab Caseload Tracker  Orders    Alveria Severin is a 68 y.o. male   PRIMARY DIAGNOSIS: Bacteremia due to Staphylococcus  Bacteremia due to Staphylococcus [R78.81, B95.8]  Procedure(s) (LRB):  RIGHT SECOND TOE AMPUTATION (Right)  5 Days Post-Op  Inpatient: Payor: Musa Mcarthur / Plan: [address] [facility] / Product Type: *No Product type* /     ASSESSMENT:     REHAB RECOMMENDATIONS:   Recommendation to date pending progress:  Setting:  Short-term Rehab    Equipment:    To Be Determined     ASSESSMENT:  Mr. Leanne Burciaga presents in bedside chair, agreeable to session. Upon entering, Pnt is agreeable to PT treatment. he reports no pain in his foot at rest. Pnt performed  Sit > stand with CGA-min A using RW. Gait 3 x 25 ft with chair follow by [place], cues for step length. Gait is noted to be slowed and shuffled. With each seated rest break, he requires multi-modal cues for forward lean during STS. Stand > sit with CGA0 min A, followed by positioning for comfort. He then stands at sink and performs reaching outside of PIPO. At end of session pt up in chair with all needs within reach, alarm activated for safety, RN notified. Overall, great progress today as pnt improved in ambulation distance.  Pnt continues to present as functioning below his baseline,

## 2023-10-02 NOTE — CARE COORDINATION
[facility] declined stating pt was functioning too high level for acute inpatient rehab. [facility] does not have a bed available. [facility] due to low reimbursement from pt's insurance when pt's have extra services/needs such as IV ABX and wound vacs. CM met with pt and updated him with this information. He still wants to pursue rehab and requested  referrals to [facility] and [facility]. Referrals submitted. Awaiting responses.

## 2023-10-02 NOTE — PROGRESS NOTES
Patient resting in bed with eyes closed. No c/o pain or discomfort at this time. Hourly rounds performed this shift. Bed lowered and locked. Call light within reach. All needs met at this time. Bedside shift report will be given to oncoming nurse.

## 2023-10-02 NOTE — WOUND CARE
Right foot with increased edema, patient is leaving legs down when sitting in chair encouraged to elevate when resting. Noted Dr. Tete Stevenson in to see patient this am. Wound vac reapplied, hydrocolloid at edges to bolster seal. Home machine in room for discharge. Will monitor.

## 2023-10-02 NOTE — PROGRESS NOTES
Patient seen and examined. I removed wound VAC this morning evaluated the wound. The wound is open measure about 2 x 3 cm. There was no purulent drainage there was no ascending cellulitis. I packed the wound with iodoform dressing. Wound care nurse can come around to replace the incisional VAC. From vascular standpoint okay to be discharged to facility. Patient has no activity restrictions.     Estil Manus

## 2023-10-02 NOTE — PROGRESS NOTES
skilled OT during stay.        SUBJECTIVE:     Mr. Kristy Johnson states, \"I will turn around and head back\"     Social/Functional Lives With: Spouse  Type of Home: House  Home Layout: One level  Home Access: Level entry  Bathroom Shower/Tub: Walk-in shower  Bathroom Equipment: Shower chair  Home Equipment: [place] Case Help From: Family  ADL Assistance: Independent  Homemaking Assistance: Independent  Ambulation Assistance: Independent  Transfer Assistance: Independent  Occupation: Full time employment    OBJECTIVE:     LINES / Divya Deniz / AIRWAY: Wound Vac    RESTRICTIONS/PRECAUTIONS:  Restrictions/Precautions  Restrictions/Precautions: Fall Risk, Weight Bearing  Lower Extremity Weight Bearing Restrictions  Right Lower Extremity Weight Bearing:  (offloading shoe)        PAIN: VITALS / O2:   Pre Treatment:    0        Post Treatment: 0 Vitals          Oxygen        MOBILITY: I Mod I S SBA CGA Min Mod Max Total  NT x2 Comments:   Bed Mobility    Rolling [] [] [] [] [] [] [] [] [] [x] []    Supine to Sit [] [] [] [] [] [] [] [] [] [x] []    Scooting [] [] [] [] [] [] [] [] [] [x] []    Sit to Supine [] [] [] [] [] [] [] [] [] [x] []    Transfers    Sit to Stand [] [] [] [] [x] [x] [] [] [] [] []    Bed to Chair [] [] [] [] [] [] [] [] [] [x] []    Stand to Sit [] [] [] [x] [] [] [] [] [] [] []    Tub/Shower [] [] [] [] [] [] [] [] [] [x] []     Toilet [] [] [] [] [] [] [] [] [] [x] []      [] [] [] [] [] [] [] [] [] [] []    I=Independent, Mod I=Modified Independent, S=Supervision/Setup, SBA=Standby Assistance, CGA=Contact Guard Assistance, Min=Minimal Assistance, Mod=Moderate Assistance, Max=Maximal Assistance, Total=Total Assistance, NT=Not Tested    ACTIVITIES OF DAILY LIVING: I Mod I S SBA CGA Min Mod Max Total NT Comments   BASIC ADLs:              Upper Body   Bathing [] [] [] [] [] [] [] [] [] [x]     Lower Body Bathing [] [] [] [] [] [] [] [] [] [x]     Toileting [] [] [] [] [] [] [] [] [] [x]    Upper

## 2023-10-03 LAB
GLUCOSE BLD STRIP.AUTO-MCNC: 126 MG/DL (ref 65–100)
GLUCOSE BLD STRIP.AUTO-MCNC: 132 MG/DL (ref 65–100)
GLUCOSE BLD STRIP.AUTO-MCNC: 152 MG/DL (ref 65–100)
GLUCOSE BLD STRIP.AUTO-MCNC: 168 MG/DL (ref 65–100)
MM INDURATION, POC: 0 MM (ref 0–5)
PPD, POC: NEGATIVE
SERVICE CMNT-IMP: ABNORMAL
VANCOMYCIN SERPL-MCNC: 21.6 UG/ML

## 2023-10-03 PROCEDURE — 6360000002 HC RX W HCPCS: Performed by: INTERNAL MEDICINE

## 2023-10-03 PROCEDURE — 36592 COLLECT BLOOD FROM PICC: CPT

## 2023-10-03 PROCEDURE — 97530 THERAPEUTIC ACTIVITIES: CPT

## 2023-10-03 PROCEDURE — 2580000003 HC RX 258: Performed by: HOSPITALIST

## 2023-10-03 PROCEDURE — 80202 ASSAY OF VANCOMYCIN: CPT

## 2023-10-03 PROCEDURE — 2580000003 HC RX 258: Performed by: SURGERY

## 2023-10-03 PROCEDURE — 6370000000 HC RX 637 (ALT 250 FOR IP): Performed by: SURGERY

## 2023-10-03 PROCEDURE — 97116 GAIT TRAINING THERAPY: CPT

## 2023-10-03 PROCEDURE — 99024 POSTOP FOLLOW-UP VISIT: CPT | Performed by: SURGERY

## 2023-10-03 PROCEDURE — 6360000002 HC RX W HCPCS: Performed by: HOSPITALIST

## 2023-10-03 PROCEDURE — 1100000000 HC RM PRIVATE

## 2023-10-03 PROCEDURE — 82962 GLUCOSE BLOOD TEST: CPT

## 2023-10-03 PROCEDURE — 6360000002 HC RX W HCPCS: Performed by: SURGERY

## 2023-10-03 PROCEDURE — 2580000003 HC RX 258: Performed by: INTERNAL MEDICINE

## 2023-10-03 RX ADMIN — VANCOMYCIN HYDROCHLORIDE 750 MG: 750 INJECTION, POWDER, LYOPHILIZED, FOR SOLUTION INTRAVENOUS at 15:03

## 2023-10-03 RX ADMIN — Medication 1 AMPULE: at 21:54

## 2023-10-03 RX ADMIN — AMLODIPINE BESYLATE 5 MG: 5 TABLET ORAL at 17:13

## 2023-10-03 RX ADMIN — SODIUM CHLORIDE, PRESERVATIVE FREE 10 ML: 5 INJECTION INTRAVENOUS at 21:53

## 2023-10-03 RX ADMIN — LISINOPRIL 20 MG: 20 TABLET ORAL at 17:13

## 2023-10-03 RX ADMIN — Medication 1 AMPULE: at 09:09

## 2023-10-03 RX ADMIN — SODIUM CHLORIDE, PRESERVATIVE FREE 10 ML: 5 INJECTION INTRAVENOUS at 09:09

## 2023-10-03 RX ADMIN — VANCOMYCIN HYDROCHLORIDE 1000 MG: 1 INJECTION, POWDER, LYOPHILIZED, FOR SOLUTION INTRAVENOUS at 03:11

## 2023-10-03 RX ADMIN — ACETAMINOPHEN 650 MG: 325 TABLET ORAL at 21:55

## 2023-10-03 RX ADMIN — ENOXAPARIN SODIUM 40 MG: 100 INJECTION SUBCUTANEOUS at 21:54

## 2023-10-03 ASSESSMENT — PAIN DESCRIPTION - FREQUENCY: FREQUENCY: INTERMITTENT

## 2023-10-03 ASSESSMENT — PAIN SCALES - GENERAL
PAINLEVEL_OUTOF10: 0
PAINLEVEL_OUTOF10: 2
PAINLEVEL_OUTOF10: 0
PAINLEVEL_OUTOF10: 0

## 2023-10-03 ASSESSMENT — PAIN - FUNCTIONAL ASSESSMENT: PAIN_FUNCTIONAL_ASSESSMENT: PREVENTS OR INTERFERES SOME ACTIVE ACTIVITIES AND ADLS

## 2023-10-03 ASSESSMENT — PAIN DESCRIPTION - DESCRIPTORS: DESCRIPTORS: ACHING

## 2023-10-03 ASSESSMENT — PAIN DESCRIPTION - ONSET: ONSET: GRADUAL

## 2023-10-03 ASSESSMENT — PAIN DESCRIPTION - ORIENTATION: ORIENTATION: RIGHT

## 2023-10-03 ASSESSMENT — PAIN DESCRIPTION - LOCATION: LOCATION: FOOT

## 2023-10-03 ASSESSMENT — PAIN DESCRIPTION - PAIN TYPE: TYPE: SURGICAL PAIN

## 2023-10-03 NOTE — PROGRESS NOTES
ACUTE PHYSICAL THERAPY GOALS:   (Developed with and agreed upon by patient and/or caregiver.)  Pt will perform bed mobility with ind in 7 therapy sessions. Pt will perform sit-to-stand/ stand-to-sit transfers Katrina in 7 therapy sessions. Pt will ambulate 125 ft SBA with use of LRAD/no device and breaks as needed in 7 therapy sessions. Pt will perform standing dynamic balance activities with minimal postural sway in 7 therapy sessions. Pt will tolerate multiple sets and reps of BLE exercises in 7 therapy sessions. PHYSICAL THERAPY: Daily Note AM   Offloading shoe RLE  (Link to Caseload Tracking: PT Visit Days : 3  Time In/Out PT Charge Capture  Rehab Caseload Tracker  Orders    Belia Newman is a 68 y.o. male   PRIMARY DIAGNOSIS: Bacteremia due to Staphylococcus  Bacteremia due to Staphylococcus [R78.81, B95.8]  Procedure(s) (LRB):  RIGHT SECOND TOE AMPUTATION (Right)  6 Days Post-Op  Inpatient: Payor: Riley Sun / Plan: [facility] / Product Type: *No Product type* /     ASSESSMENT:     REHAB RECOMMENDATIONS:   Recommendation to date pending progress:  Setting:  Short-term Rehab    Equipment:    To Be Determined     ASSESSMENT:  Mr. Herlinda Madrigal is making steady progress toward goals with increased gait  distances. He reports already dressing and bathing and does not require assistance with any ADLs at this time. He was agreeable to ambulation and demonstrated good heel weight bearing technique with the off weighting boot in place.         SUBJECTIVE:   Mr. Herlinda Madrigal states, \"I think I'm good for now\"     Social/Functional Lives With: Spouse  Type of Home: House  Home Layout: One level  Home Access: Level entry  Bathroom Shower/Tub: Walk-in shower  Bathroom Equipment: Shower chair  Home Equipment: [email or web address], [address] [place] [address] Help From: Family  ADL Assistance: Independent  Homemaking Assistance: Independent  Ambulation Assistance: Independent  Transfer Assistance: Independent  Occupation: Full time employment  OBJECTIVE:     PAIN: VITALS / O2: PRECAUTION / [place] / DRAINS:   Pre Treatment: 0         Post Treatment: 0 Vitals        Oxygen    IV    RESTRICTIONS/PRECAUTIONS:        MOBILITY: I Mod I S SBA CGA Min Mod Max Total  NT x2 Comments:   Bed Mobility    Rolling [] [] [] [] [] [] [] [] [] [x] []    Supine to Sit [] [] [] [] [] [] [] [] [] [x] []    Scooting [] [] [] [] [] [] [] [] [] [x] []    Sit to Supine [] [] [] [] [] [] [] [] [] [x] []    Transfers    Sit to Stand [] [] [] [] [x] [] [] [] [] [] [x]    Bed to Chair [] [] [] [] [x] [] [] [] [] [] [x]    Stand to Sit [] [] [] [] [x] [] [] [] [] [] [x]     [] [] [] [] [] [] [] [] [] [] []    I=Independent, Mod I=Modified Independent, S=Supervision, SBA=Standby Assistance, CGA=Contact Guard Assistance,   Min=Minimal Assistance, Mod=Moderate Assistance, Max=Maximal Assistance, Total=Total Assistance, NT=Not Tested    BALANCE: Good Fair+ Fair Fair- Poor NT Comments   Sitting Static [x] [] [] [] [] []    Sitting Dynamic [] [x] [] [] [] []              Standing Static [] [x] [] [] [] []    Standing Dynamic [] [x] [] [] [] []      GAIT: I Mod I S SBA CGA Min Mod Max Total  NT x2 Comments:   Level of Assistance [] [] [] [] [x] [] [] [] [] [] []    Distance  2x100 feet    DME Rolling Walker    Gait Quality Decreased farzaneh , Decreased step clearance, Decreased step length, and Decreased stance    Weightbearing Status      Stairs      I=Independent, Mod I=Modified Independent, S=Supervision, SBA=Standby Assistance, CGA=Contact Guard Assistance,   Min=Minimal Assistance, Mod=Moderate Assistance, Max=Maximal Assistance, Total=Total Assistance, NT=Not Tested    PLAN:   FREQUENCY AND DURATION: 3 times/week for duration of hospital stay or until stated goals are met, whichever comes first.    TREATMENT:   TREATMENT:   Co-Treatment PT/OT necessary due to patient's decreased overall endurance/tolerance levels, as well as need for high

## 2023-10-03 NOTE — PROGRESS NOTES
ACUTE OCCUPATIONAL THERAPY GOALS:   (Developed with and agreed upon by patient and/or caregiver.)  1. Patient will complete lower body bathing and dressing with STAND BY ASSIST and adaptive equipment as needed. 2. Patient will complete toileting with STAND BY ASSIST. 3. Patient will complete grooming ADL standing at sink with STAND BY ASSIST. 4. Patient will tolerate 25 minutes of OT treatment with 1-2 rest breaks to increase activity tolerance for ADLs. 5. Patient will complete functional transfers with STAND BY ASSIST and adaptive equipment as needed. 6. Patient will tolerate 10 minutes BUE exercises to increase strength for safe, functional transfers. Timeframe: 7 visits        OCCUPATIONAL THERAPY: Daily Note AM   OT Visit Days: 5   Time In/Out  OT Charge Capture  Rehab Caseload Tracker  OT Orders    Griselda Edwards is a 68 y.o. male   PRIMARY DIAGNOSIS: Bacteremia due to Staphylococcus  Bacteremia due to Staphylococcus [R78.81, B95.8]  Procedure(s) (LRB):  RIGHT SECOND TOE AMPUTATION (Right)  6 Days Post-Op  Inpatient: Payor: Demetrius Leonard / Plan: [facility] / Product Type: *No Product type* /     ASSESSMENT:     REHAB RECOMMENDATIONS: CURRENT LEVEL OF FUNCTION:  (Most Recently Demonstrated)   Recommendation to date pending progress:  Setting:  Short-term Rehab    Equipment:    To Be Determined Bathing:  Not Tested  Dressing:  Not Tested   Feeding/Grooming:  Not Tested  Toileting:  Not Tested  Functional Mobility:  Stand by Assist/CGA     ASSESSMENT:  Mr. Vinh Ruelas completed functional mobility in the room and in the hallway with SBA/CGA using a rolling walker. Pt returned to his room and declined further activity with OT. Good effort with mobility. Continue POC. SUBJECTIVE:     Mr. Vinh Ruelas states, \"I've already did all of that stuff at the sink and some of my exercises. \"     Social/Functional Lives With: Spouse  Type of Home: House  Home Layout: One level  Home Access: Level entry  Bathroom Shower/Tub: Walk-in shower  Bathroom Equipment: Shower chair  Home Equipment: [place], [address] [facility] Help From: Family  ADL Assistance: Independent  Homemaking Assistance: Independent  Ambulation Assistance: Independent  Transfer Assistance: Independent  Occupation: Full time employment    OBJECTIVE:     LINES / [place]ne / AIRWAY: Wound Vac    RESTRICTIONS/PRECAUTIONS:  Restrictions/Precautions  Restrictions/Precautions: Fall Risk, Weight Bearing  Lower Extremity Weight Bearing Restrictions  Right Lower Extremity Weight Bearing:  (offloading shoe)        PAIN: VITALS / O2:   Pre Treatment:    0        Post Treatment: 0 Vitals          Oxygen        MOBILITY: I Mod I S SBA CGA Min Mod Max Total  NT x2 Comments:   Bed Mobility    Rolling [] [] [] [] [] [] [] [] [] [x] []    Supine to Sit [] [] [] [] [] [] [] [] [] [x] []    Scooting [] [] [] [] [] [] [] [] [] [x] []    Sit to Supine [] [] [] [] [] [] [] [] [] [x] []    Transfers    Sit to Stand [] [] [] [x] [x] [] [] [] [] [] []    Bed to Chair [] [] [] [] [] [] [] [] [] [x] []    Stand to Sit [] [] [] [x] [x] [] [] [] [] [] []    Tub/Shower [] [] [] [] [] [] [] [] [] [x] []     Toilet [] [] [] [] [] [] [] [] [] [x] []      [] [] [] [] [] [] [] [] [] [] []    I=Independent, Mod I=Modified Independent, S=Supervision/Setup, SBA=Standby Assistance, CGA=Contact Guard Assistance, Min=Minimal Assistance, Mod=Moderate Assistance, Max=Maximal Assistance, Total=Total Assistance, NT=Not Tested    ACTIVITIES OF DAILY LIVING: I Mod I S SBA CGA Min Mod Max Total NT Comments   BASIC ADLs:              Upper Body   Bathing [] [] [] [] [] [] [] [] [] [x]     Lower Body Bathing [] [] [] [] [] [] [] [] [] [x]     Toileting [] [] [] [] [] [] [] [] [] [x]    Upper Body Dressing [] [] [] [] [] [] [] [] [] [x]    Lower Body Dressing [] [] [] [] [] [] [] [] [] [x]    Feeding [] [] [] [] [] [] [] [] [] [x]    Grooming [] [] [] [] [] [] [] [] [] [x]

## 2023-10-03 NOTE — PROGRESS NOTES
Hospitalist Progress Note   Admit Date:  2023  6:22 PM   Name:  Malia Floyd   Age:  68 y.o. Sex:  male  :  1950   MRN:  [id number]   Room:  [facility]/    Presenting/Chief Complaint: No chief complaint on file. Reason(s) for Admission: Bacteremia due to Staphylococcus [R78.81, B95.8]     Hospital Course:     Malia Floyd is a 68 y.o. male with medical history of HTN, Dm2, admitted with right 2nd toe wound. Found with MRSA bacteremia and sepsis as well as right DM2 foot ulcer. Osteomyelitis right 2nd toe    Wound vac placed, off loading boot needed     Seen by vascular s/p amputation right 2nd toe     ID followed MRSA bacteremia, EOT vancomycin 10-10-23, then doxycycline for EOT 23     PICC placed       TTE no IE      He had WILL, since resolved      Labs\"  Every Monday: CBC, Cr, LFTs, and VT  Every Thursday: Cr and VT \"      Discharge plans pending SNF        Subjective & 24hr Events:     Sleeping, no pain, wound vac ok , eating, has BM       Assessment & Plan:     Principal Problem:    Bacteremia due to Staphylococcus  Plan:  Right 2nd toe OM:  S/p right 2nd toe amputation  ID following  IV vancomycin EOT 10-10-23 then oral doxycycline EOT 23   Wound vac  Off loading boot  Pending SNF          WILL:  Resolved        Anemia:  Trend HGB         HTN:  Norvasc  Lisinopril         DM2:  SSI            PT/OT evals and PPD needed/ordered? Yes  Diet:  ADULT ORAL NUTRITION SUPPLEMENT; Breakfast, Dinner; Wound Healing Oral Supplement  ADULT ORAL NUTRITION SUPPLEMENT; Lunch; Low Calorie/High Protein Oral Supplement  ADULT DIET; Regular; 3 carb choices (45 gm/meal);  Low Fat/Low Chol/High Fiber/2 gm Na  VTE prophylaxis: Lovenox  Code status: Full Code      Non-peripheral Lines and Tubes (if present):      Negative Pressure Wound Therapy Toe (Comment  which one) Right (Active)     PICC Single Lumen 98/74/84 Left Cephalic (Active)       Telemetry (if present):

## 2023-10-03 NOTE — CARE COORDINATION
Patient has been accepted for admission to [facility] pending insurance approval.  Facility initiated the insurance auth request today. CM spoke with pt at the bedside to update him. He verbalized agreement with this dc plan. CM following.

## 2023-10-04 PROBLEM — B95.62 MRSA BACTEREMIA: Status: ACTIVE | Noted: 2023-09-26

## 2023-10-04 PROBLEM — M86.9 OSTEOMYELITIS OF SECOND TOE OF RIGHT FOOT (HCC): Status: ACTIVE | Noted: 2023-10-04

## 2023-10-04 PROBLEM — D64.9 NORMOCYTIC ANEMIA: Chronic | Status: ACTIVE | Noted: 2023-10-04

## 2023-10-04 PROBLEM — S98.131A AMPUTATION OF TOE OF RIGHT FOOT (HCC): Status: ACTIVE | Noted: 2023-10-04

## 2023-10-04 PROBLEM — E11.628 DIABETIC FOOT INFECTION (HCC): Status: ACTIVE | Noted: 2023-10-04

## 2023-10-04 PROBLEM — L08.9 DIABETIC FOOT INFECTION (HCC): Status: ACTIVE | Noted: 2023-10-04

## 2023-10-04 LAB
ALBUMIN SERPL-MCNC: 2.4 G/DL (ref 3.2–4.6)
ALBUMIN/GLOB SERPL: 0.5 (ref 0.4–1.6)
ALP SERPL-CCNC: 87 U/L (ref 50–136)
ALT SERPL-CCNC: 38 U/L (ref 12–65)
ANION GAP SERPL CALC-SCNC: 7 MMOL/L (ref 2–11)
AST SERPL-CCNC: 30 U/L (ref 15–37)
BASOPHILS # BLD: 0.1 K/UL (ref 0–0.2)
BASOPHILS NFR BLD: 1 % (ref 0–2)
BILIRUB SERPL-MCNC: 0.3 MG/DL (ref 0.2–1.1)
BUN SERPL-MCNC: 22 MG/DL (ref 8–23)
CALCIUM SERPL-MCNC: 8.9 MG/DL (ref 8.3–10.4)
CHLORIDE SERPL-SCNC: 109 MMOL/L (ref 101–110)
CO2 SERPL-SCNC: 27 MMOL/L (ref 21–32)
CREAT SERPL-MCNC: 0.9 MG/DL (ref 0.8–1.5)
DIFFERENTIAL METHOD BLD: ABNORMAL
EOSINOPHIL # BLD: 0.4 K/UL (ref 0–0.8)
EOSINOPHIL NFR BLD: 4 % (ref 0.5–7.8)
ERYTHROCYTE [DISTWIDTH] IN BLOOD BY AUTOMATED COUNT: 13.2 % (ref 11.9–14.6)
GLOBULIN SER CALC-MCNC: 4.6 G/DL (ref 2.8–4.5)
GLUCOSE BLD STRIP.AUTO-MCNC: 131 MG/DL (ref 65–100)
GLUCOSE BLD STRIP.AUTO-MCNC: 146 MG/DL (ref 65–100)
GLUCOSE BLD STRIP.AUTO-MCNC: 154 MG/DL (ref 65–100)
GLUCOSE BLD STRIP.AUTO-MCNC: 187 MG/DL (ref 65–100)
GLUCOSE SERPL-MCNC: 132 MG/DL (ref 65–100)
HCT VFR BLD AUTO: 28.8 % (ref 41.1–50.3)
HGB BLD-MCNC: 9.3 G/DL (ref 13.6–17.2)
IMM GRANULOCYTES # BLD AUTO: 0.1 K/UL (ref 0–0.5)
IMM GRANULOCYTES NFR BLD AUTO: 1 % (ref 0–5)
LYMPHOCYTES # BLD: 2.3 K/UL (ref 0.5–4.6)
LYMPHOCYTES NFR BLD: 26 % (ref 13–44)
MCH RBC QN AUTO: 27.6 PG (ref 26.1–32.9)
MCHC RBC AUTO-ENTMCNC: 32.3 G/DL (ref 31.4–35)
MCV RBC AUTO: 85.5 FL (ref 82–102)
MONOCYTES # BLD: 0.5 K/UL (ref 0.1–1.3)
MONOCYTES NFR BLD: 6 % (ref 4–12)
NEUTS SEG # BLD: 5.4 K/UL (ref 1.7–8.2)
NEUTS SEG NFR BLD: 62 % (ref 43–78)
NRBC # BLD: 0 K/UL (ref 0–0.2)
PLATELET # BLD AUTO: 430 K/UL (ref 150–450)
PMV BLD AUTO: 10.1 FL (ref 9.4–12.3)
POTASSIUM SERPL-SCNC: 4.1 MMOL/L (ref 3.5–5.1)
PROT SERPL-MCNC: 7 G/DL (ref 6.3–8.2)
RBC # BLD AUTO: 3.37 M/UL (ref 4.23–5.6)
SERVICE CMNT-IMP: ABNORMAL
SODIUM SERPL-SCNC: 143 MMOL/L (ref 133–143)
WBC # BLD AUTO: 8.7 K/UL (ref 4.3–11.1)

## 2023-10-04 PROCEDURE — 2580000003 HC RX 258: Performed by: INTERNAL MEDICINE

## 2023-10-04 PROCEDURE — 6370000000 HC RX 637 (ALT 250 FOR IP): Performed by: SURGERY

## 2023-10-04 PROCEDURE — 97530 THERAPEUTIC ACTIVITIES: CPT

## 2023-10-04 PROCEDURE — 82962 GLUCOSE BLOOD TEST: CPT

## 2023-10-04 PROCEDURE — 6360000002 HC RX W HCPCS: Performed by: INTERNAL MEDICINE

## 2023-10-04 PROCEDURE — 2580000003 HC RX 258: Performed by: SURGERY

## 2023-10-04 PROCEDURE — 85025 COMPLETE CBC W/AUTO DIFF WBC: CPT

## 2023-10-04 PROCEDURE — 36592 COLLECT BLOOD FROM PICC: CPT

## 2023-10-04 PROCEDURE — 1100000000 HC RM PRIVATE

## 2023-10-04 PROCEDURE — 80053 COMPREHEN METABOLIC PANEL: CPT

## 2023-10-04 RX ADMIN — VANCOMYCIN HYDROCHLORIDE 750 MG: 750 INJECTION, POWDER, LYOPHILIZED, FOR SOLUTION INTRAVENOUS at 03:30

## 2023-10-04 RX ADMIN — SODIUM CHLORIDE, PRESERVATIVE FREE 10 ML: 5 INJECTION INTRAVENOUS at 03:27

## 2023-10-04 RX ADMIN — LISINOPRIL 20 MG: 20 TABLET ORAL at 20:01

## 2023-10-04 RX ADMIN — VANCOMYCIN HYDROCHLORIDE 750 MG: 750 INJECTION, POWDER, LYOPHILIZED, FOR SOLUTION INTRAVENOUS at 15:07

## 2023-10-04 RX ADMIN — Medication 1 AMPULE: at 20:00

## 2023-10-04 RX ADMIN — AMLODIPINE BESYLATE 5 MG: 5 TABLET ORAL at 20:00

## 2023-10-04 RX ADMIN — SODIUM CHLORIDE, PRESERVATIVE FREE 5 ML: 5 INJECTION INTRAVENOUS at 07:57

## 2023-10-04 RX ADMIN — Medication 1 AMPULE: at 07:57

## 2023-10-04 RX ADMIN — ACETAMINOPHEN 650 MG: 325 TABLET ORAL at 05:31

## 2023-10-04 RX ADMIN — SODIUM CHLORIDE, PRESERVATIVE FREE 10 ML: 5 INJECTION INTRAVENOUS at 05:05

## 2023-10-04 RX ADMIN — SODIUM CHLORIDE, PRESERVATIVE FREE 10 ML: 5 INJECTION INTRAVENOUS at 20:02

## 2023-10-04 ASSESSMENT — PAIN DESCRIPTION - ONSET: ONSET: GRADUAL

## 2023-10-04 ASSESSMENT — PAIN DESCRIPTION - ORIENTATION: ORIENTATION: RIGHT

## 2023-10-04 ASSESSMENT — PAIN DESCRIPTION - DESCRIPTORS: DESCRIPTORS: ACHING

## 2023-10-04 ASSESSMENT — PAIN SCALES - GENERAL
PAINLEVEL_OUTOF10: 0
PAINLEVEL_OUTOF10: 2
PAINLEVEL_OUTOF10: 0

## 2023-10-04 ASSESSMENT — PAIN DESCRIPTION - FREQUENCY: FREQUENCY: INTERMITTENT

## 2023-10-04 ASSESSMENT — PAIN - FUNCTIONAL ASSESSMENT: PAIN_FUNCTIONAL_ASSESSMENT: PREVENTS OR INTERFERES WITH MANY ACTIVE NOT PASSIVE ACTIVITIES

## 2023-10-04 ASSESSMENT — PAIN DESCRIPTION - PAIN TYPE: TYPE: SURGICAL PAIN

## 2023-10-04 ASSESSMENT — PAIN DESCRIPTION - LOCATION: LOCATION: FOOT

## 2023-10-04 NOTE — PROGRESS NOTES
Hospitalist Progress Note   Admit Date:  2023  6:22 PM   Name:  Belia Newman   Age:  68 y.o. Sex:  male  :  1950   MRN:  [id number]   Room:  [facility]    Presenting/Chief Complaint: No chief complaint on file. Reason(s) for Admission: Bacteremia due to Staphylococcus [R78.81, B95.8]     Hospital Course:   Patient is a 67 y/o male with medical history of hypertension, DM II, right 2nd toe wound admitted for WILL and sepsis secondary to MRSA bacteremia and diabetic foot ulcer/osteomyelitis of right 2nd toe. Patient underwent right 2nd toe amputation with Dr. Александр Santo [facility] 23. Wound vac placed. Wound and blood cultures finalized with MRSA. ID consultation with final plan Vancomycin eot 10/10/23 then doxycycline eot 23. PICC placed. TTE with no IE. WILL resolved. PT/OT with rehab recommendations. Medically stable to discharge pending rehab placement. Subjective & 24hr Events:   Patient alert, oriented, conversational, in no acute distress. Denies chest pain, shortness of breath. He is trying to adjust to ambulating with Darco boot which causes some residual pain up his leg  Pain currently well controlled with acetaminophen. Wound vac and Darco boot intact to R foot  Vanc level and Cr ordered for tomorrow am. Will also check iron panel.     Assessment & Plan:     MRSA bacteremia  -Source diabetic foot infection  -Wound and blood cultures finalized with MRSA  -TTE with no IE  -ID final plan: Vancomycin 1G IV Q12H eot 23 then transition to Doxycycline 100 mg po BID eot 2023  -PICC intact, routine picc care, pull line at EOT  -Labs Q Monday: CBC, Cr, LFT's, VT  -Labs Q Thursday: Cr and VT  -Fax all lab results to [phone]    Diabetic foot infection  Osteomyelitis of second toe of right foot ([address])  -s/p right 2nd toe amputation with Vascular surgery 23  -Wound vac and Darco boot intact, wound care ongoing    Hypertension  -Amlodipine 5 mg po daily, hours) at 10/4/2023 1252  Last data filed at 10/4/2023 1211  Gross per 24 hour   Intake 1021.18 ml   Output 2075 ml   Net -1053.82 ml         Physical Exam:     General:    Well nourished. Alert. Conversational. Pleasant  CV:   RRR. No m/r/g. Lungs:   Respirations even and unlabored on RA  Abdomen:   Obese, soft, nondistended. Extremities: RLE with wound vac, surgical dressing, and Darco boot intact, edema present to RLE, right 2nd toe amputation  Skin:     Warm and dry. Neuro:  Grossly non focal    I have personally reviewed labs and tests:  Recent Labs:  Recent Results (from the past 48 hour(s))   POCT Glucose    Collection Time: 10/02/23  4:20 PM   Result Value Ref Range    POC Glucose 170 (H) 65 - 100 mg/dL    Performed by: Дмитрий    POCT Glucose    Collection Time: 10/02/23  8:10 PM   Result Value Ref Range    POC Glucose 169 (H) 65 - 100 mg/dL    Performed by: Chelo Weaver PPD TEST IN 72 HRS    Collection Time: 10/03/23 12:00 AM   Result Value Ref Range    PPD, (POC) Negative Negative    mm Induration 0 0 - 5 mm   POCT Glucose    Collection Time: 10/03/23  6:11 AM   Result Value Ref Range    POC Glucose 126 (H) 65 - 100 mg/dL    Performed by: Marichuy    Vancomycin Level, Random    Collection Time: 10/03/23  9:13 AM   Result Value Ref Range    Vancomycin Rm 21.6 UG/ML   POCT Glucose    Collection Time: 10/03/23 11:41 AM   Result Value Ref Range    POC Glucose 132 (H) 65 - 100 mg/dL    Performed by: Eveline    POCT Glucose    Collection Time: 10/03/23  3:35 PM   Result Value Ref Range    POC Glucose 152 (H) 65 - 100 mg/dL    Performed by:  Bernardino    POCT Glucose    Collection Time: 10/03/23  8:16 PM   Result Value Ref Range    POC Glucose 168 (H) 65 - 100 mg/dL    Performed by: Marichuy    Comprehensive Metabolic Panel    Collection Time: 10/04/23  5:21 AM   Result Value Ref Range    Sodium 143 133 - 143 mmol/L    Potassium

## 2023-10-04 NOTE — PROGRESS NOTES
Physician Progress Note      PATIENT:               Héctor Hernández  CSN #:                  [id number]  :                       1950  ADMIT DATE:       2023 2:20 PM  [address] [place] [address] DATE:        2023 5:15 PM  RESPONDING  PROVIDER #:        Domitila Milner MD          QUERY TEXT:    Pt admitted with osteomyelitis. Pt noted to have elevated WBC, elevated CRP,   WILL, and elevated glucose. If possible, please document in the progress notes   and discharge summary if you are evaluating and /or treating any of the   following: The medical record reflects the following:  Risk Factors: DM2 with OM, WILL  Clinical Indicators:  H&P notes patient with diabetic foot ulcer with   osteomyelitis. Admission WBC 18.5k, CRP 21, Cr of 1.37, and glucose of 157. Admission BCx positive for staph bacteremia , wound culture from  with   heavy MRSA, MRI confirmed osteomyelitis. Treatment: IV fluids, Cefepime, Vancomycin, Cultures  Options provided:  -- Sepsis, present on admission  -- Osteomyelitis without Sepsis  -- Other - I will add my own diagnosis  -- Disagree - Not applicable / Not valid  -- Disagree - Clinically unable to determine / Unknown  -- Refer to Clinical Documentation Reviewer    PROVIDER RESPONSE TEXT:    This patient has sepsis which was present on admission. Query created by:  Matt Pedro on 2023 11:43 AM      Electronically signed by:  Domitila Milner MD 10/4/2023 9:14 AM

## 2023-10-04 NOTE — PROGRESS NOTES
Comprehensive Nutrition Assessment    Type and Reason for Visit: Reassess  Wounds (Hospitalists)    Nutrition Recommendations/Plan:   Meals and Snacks:  Diet: Continue current order  Nutrition Supplement Therapy:  Medical food supplement therapy:  Continue Ensure High Protein once per day (this provides 160 kcal and 16 grams protein per bottle)     Malnutrition Assessment:  Malnutrition Status: No malnutrition    NFPE unremarkable  Nutrition Assessment:  Nutrition History: Patient reports that at home he follows very strict diet due to his diabetes and need to stay well inorder to take care of his wife. He reports he aims to get about 70 grams of protein each day through real food. He says wife does use ensure but he does not use them. He denies any recent weight loss (confirmed by EMR history). He states no changes to intake prior to admission. Do You Have Any Cultural, Confucianist, or Ethnic Food Preferences?: No   Nutrition Background:   Wound Type: Diabetic Ulcer   PMH: HTN, DM. Patient presented with 2nd toe wound, amputation 9/27. Nutrition Interval:  Pt seen sitting in recliner. He reports he is consuming 75-80% of his meals. He stated he is consuming his Ensure HP each day. Pt stated he is not consuming Dank. Pt reports he does not want to receive Dank any more, he does not provide RD a reason why. Current Nutrition Therapies:  ADULT ORAL NUTRITION SUPPLEMENT; Breakfast, Dinner; Wound Healing Oral Supplement  ADULT ORAL NUTRITION SUPPLEMENT; Lunch; Low Calorie/High Protein Oral Supplement  ADULT DIET; Regular; 3 carb choices (45 gm/meal); Low Fat/Low Chol/High Fiber/2 gm Na    Current Intake:   Average Meal Intake: % Average Supplements Intake: % (of Ensure HP)      Anthropometric Measures:  Height: 5' 8\" (172.7 cm)  Current Body Wt: 182 lb 12.2 oz (82.9 kg) (10/2), Weight source: Bed Scale  BMI: 27.8, Overweight (BMI 25.0-29. 9)  Admission Body Weight: 191 lb 5.8 oz (86.8 kg) (9/25

## 2023-10-04 NOTE — PROGRESS NOTES
ACUTE PHYSICAL THERAPY GOALS:   (Developed with and agreed upon by patient and/or caregiver.)  Pt will perform bed mobility with ind in 7 therapy sessions. Pt will perform sit-to-stand/ stand-to-sit transfers Katrina in 7 therapy sessions. Pt will ambulate 125 ft SBA with use of LRAD/no device and breaks as needed in 7 therapy sessions. Pt will perform standing dynamic balance activities with minimal postural sway in 7 therapy sessions. Pt will tolerate multiple sets and reps of BLE exercises in 7 therapy sessions. PHYSICAL THERAPY: Daily Note AM   Offloading shoe RLE  (Link to Caseload Tracking: PT Visit Days : 4  Time In/Out PT Charge Capture  Rehab Caseload Tracker  Orders    Sandra Sparrow is a 68 y.o. male   PRIMARY DIAGNOSIS: MRSA bacteremia  Bacteremia due to Staphylococcus [R78.81, B95.8]  Procedure(s) (LRB):  RIGHT SECOND TOE AMPUTATION (Right)  7 Days Post-Op  Inpatient: Payor: Nick Wright / Plan: [facility] [place] / Product Type: *No Product type* /     ASSESSMENT:     REHAB RECOMMENDATIONS:   Recommendation to date pending progress:  Setting:  Short-term Rehab    Equipment:    To Be Determined     ASSESSMENT:  Mr. Jarvis Lyles continues  to demonstrate good mobility and progress toward  goals. He does fatigue quickly and requires rest breaks ad cueing for pacing. The patient ambulates with an antalgic gait pattern using the RW and cueing for maintaining gait mechanics with the off-loading shoe in place.         SUBJECTIVE:   Mr. Jarvis Lyles states, \"I don't think I can go that far\"     Social/Functional Lives With: Spouse  Type of Home: House  Home Layout: One level  Home Access: Level entry  Bathroom Shower/Tub: Walk-in shower  Bathroom Equipment: Shower chair  Home Equipment: [address] Help From: Family  ADL Assistance: Independent  Homemaking Assistance: Independent  Ambulation Assistance: Independent  Transfer Assistance: Independent  Occupation: Full time

## 2023-10-05 VITALS
WEIGHT: 182.7 LBS | BODY MASS INDEX: 27.69 KG/M2 | DIASTOLIC BLOOD PRESSURE: 70 MMHG | SYSTOLIC BLOOD PRESSURE: 123 MMHG | HEIGHT: 68 IN | HEART RATE: 72 BPM | TEMPERATURE: 98.6 F | OXYGEN SATURATION: 97 % | RESPIRATION RATE: 18 BRPM

## 2023-10-05 LAB
ANION GAP SERPL CALC-SCNC: 5 MMOL/L (ref 2–11)
BUN SERPL-MCNC: 19 MG/DL (ref 8–23)
CALCIUM SERPL-MCNC: 8.6 MG/DL (ref 8.3–10.4)
CHLORIDE SERPL-SCNC: 107 MMOL/L (ref 101–110)
CO2 SERPL-SCNC: 29 MMOL/L (ref 21–32)
CREAT SERPL-MCNC: 0.9 MG/DL (ref 0.8–1.5)
FERRITIN SERPL-MCNC: 492 NG/ML (ref 8–388)
GLUCOSE BLD STRIP.AUTO-MCNC: 131 MG/DL (ref 65–100)
GLUCOSE BLD STRIP.AUTO-MCNC: 136 MG/DL (ref 65–100)
GLUCOSE BLD STRIP.AUTO-MCNC: 137 MG/DL (ref 65–100)
GLUCOSE SERPL-MCNC: 125 MG/DL (ref 65–100)
HCT VFR BLD AUTO: 29.4 % (ref 41.1–50.3)
HGB BLD-MCNC: 9.2 G/DL (ref 13.6–17.2)
IRON SATN MFR SERPL: 17 %
IRON SERPL-MCNC: 30 UG/DL (ref 35–150)
POTASSIUM SERPL-SCNC: 4.4 MMOL/L (ref 3.5–5.1)
SARS-COV-2 RDRP RESP QL NAA+PROBE: NOT DETECTED
SERVICE CMNT-IMP: ABNORMAL
SODIUM SERPL-SCNC: 141 MMOL/L (ref 133–143)
SOURCE: NORMAL
TIBC SERPL-MCNC: 177 UG/DL (ref 250–450)
VANCOMYCIN SERPL-MCNC: 21.3 UG/ML

## 2023-10-05 PROCEDURE — 2580000003 HC RX 258: Performed by: SURGERY

## 2023-10-05 PROCEDURE — 82728 ASSAY OF FERRITIN: CPT

## 2023-10-05 PROCEDURE — 97607 NEG PRS WND THR NDME<=50SQCM: CPT

## 2023-10-05 PROCEDURE — 2580000003 HC RX 258: Performed by: INTERNAL MEDICINE

## 2023-10-05 PROCEDURE — 82962 GLUCOSE BLOOD TEST: CPT

## 2023-10-05 PROCEDURE — 6370000000 HC RX 637 (ALT 250 FOR IP): Performed by: SURGERY

## 2023-10-05 PROCEDURE — 97605 NEG PRS WND THER DME<=50SQCM: CPT

## 2023-10-05 PROCEDURE — 87635 SARS-COV-2 COVID-19 AMP PRB: CPT

## 2023-10-05 PROCEDURE — 83540 ASSAY OF IRON: CPT

## 2023-10-05 PROCEDURE — 83550 IRON BINDING TEST: CPT

## 2023-10-05 PROCEDURE — 6360000002 HC RX W HCPCS: Performed by: INTERNAL MEDICINE

## 2023-10-05 PROCEDURE — 80048 BASIC METABOLIC PNL TOTAL CA: CPT

## 2023-10-05 PROCEDURE — 80202 ASSAY OF VANCOMYCIN: CPT

## 2023-10-05 PROCEDURE — 85018 HEMOGLOBIN: CPT

## 2023-10-05 PROCEDURE — 85014 HEMATOCRIT: CPT

## 2023-10-05 RX ORDER — DOXYCYCLINE HYCLATE 100 MG
100 TABLET ORAL 2 TIMES DAILY
Qty: 56 TABLET | Refills: 0 | Status: SHIPPED | OUTPATIENT
Start: 2023-10-10 | End: 2023-11-07

## 2023-10-05 RX ADMIN — SODIUM CHLORIDE, PRESERVATIVE FREE 5 ML: 5 INJECTION INTRAVENOUS at 07:11

## 2023-10-05 RX ADMIN — VANCOMYCIN HYDROCHLORIDE 750 MG: 750 INJECTION, POWDER, LYOPHILIZED, FOR SOLUTION INTRAVENOUS at 02:55

## 2023-10-05 RX ADMIN — Medication 1 AMPULE: at 07:10

## 2023-10-05 RX ADMIN — ACETAMINOPHEN 650 MG: 325 TABLET ORAL at 07:10

## 2023-10-05 RX ADMIN — VANCOMYCIN HYDROCHLORIDE 1000 MG: 1 INJECTION, POWDER, LYOPHILIZED, FOR SOLUTION INTRAVENOUS at 13:48

## 2023-10-05 ASSESSMENT — PAIN DESCRIPTION - LOCATION: LOCATION: LEG

## 2023-10-05 ASSESSMENT — PAIN DESCRIPTION - PAIN TYPE: TYPE: SURGICAL PAIN

## 2023-10-05 ASSESSMENT — PAIN DESCRIPTION - FREQUENCY: FREQUENCY: INTERMITTENT

## 2023-10-05 ASSESSMENT — PAIN SCALES - GENERAL
PAINLEVEL_OUTOF10: 0
PAINLEVEL_OUTOF10: 3

## 2023-10-05 ASSESSMENT — PAIN DESCRIPTION - ONSET: ONSET: GRADUAL

## 2023-10-05 ASSESSMENT — PAIN - FUNCTIONAL ASSESSMENT: PAIN_FUNCTIONAL_ASSESSMENT: ACTIVITIES ARE NOT PREVENTED

## 2023-10-05 ASSESSMENT — PAIN DESCRIPTION - DESCRIPTORS: DESCRIPTORS: ACHING

## 2023-10-05 ASSESSMENT — PAIN DESCRIPTION - ORIENTATION: ORIENTATION: RIGHT

## 2023-10-05 NOTE — WOUND CARE
Right foot wound vac dressing changed. Wound with subcutaneous tissue exposed, granulation and scattered slough. Seal achieved, machine working well. Might discharge today, for STR. Home vac in room, can transfer on the home machine. Will monitor.

## 2023-10-05 NOTE — PLAN OF CARE
Problem: Discharge Planning  Goal: Discharge to home or other facility with appropriate resources  9/26/2023 2026 by Codey Dave RN  Outcome: Progressing  9/26/2023 1831 by Obed Runner, RN  Outcome: Progressing     Problem: ABCDS Injury Assessment  Goal: Absence of physical injury  9/26/2023 2026 by Codey Dave RN  Outcome: Progressing  9/26/2023 1831 by Obed Runner, RN  Outcome: Progressing
Problem: Discharge Planning  Goal: Discharge to home or other facility with appropriate resources  9/27/2023 2035 by Tej David RN  Outcome: Progressing  9/27/2023 1431 by Lucia Villanueva RN  Outcome: Progressing     Problem: ABCDS Injury Assessment  Goal: Absence of physical injury  9/27/2023 2035 by Tej David RN  Outcome: Progressing  9/27/2023 1431 by Lucia Villanueva RN  Outcome: Progressing     Problem: Chronic Conditions and Co-morbidities  Goal: Patient's chronic conditions and co-morbidity symptoms are monitored and maintained or improved  9/27/2023 2035 by Tej David RN  Outcome: Progressing  9/27/2023 1431 by Lucia Villanueva RN  Outcome: Progressing     Problem: Safety - Adult  Goal: Free from fall injury  9/27/2023 2035 by Tej David RN  Outcome: Progressing  9/27/2023 1431 by Lucia Villanueva RN  Outcome: Progressing  Flowsheets (Taken 9/27/2023 0845)  Free From Fall Injury: Instruct family/caregiver on patient safety     Problem: Pain  Goal: Verbalizes/displays adequate comfort level or baseline comfort level  9/27/2023 2035 by Tej David RN  Outcome: Progressing  9/27/2023 1431 by Lucia Villanueva RN  Outcome: Progressing
Problem: Discharge Planning  Goal: Discharge to home or other facility with appropriate resources  9/28/2023 0753 by Paradise Vaughn RN  Outcome: Progressing  9/27/2023 2035 by Frederick Burks RN  Outcome: Progressing     Problem: ABCDS Injury Assessment  Goal: Absence of physical injury  9/28/2023 0753 by Paradise Vaughn RN  Outcome: Progressing  9/27/2023 2035 by Frederick Burks RN  Outcome: Progressing     Problem: Chronic Conditions and Co-morbidities  Goal: Patient's chronic conditions and co-morbidity symptoms are monitored and maintained or improved  9/28/2023 0753 by Paradise Vaughn RN  Outcome: Progressing  9/27/2023 2035 by Frederick Burks RN  Outcome: Progressing     Problem: Safety - Adult  Goal: Free from fall injury  9/28/2023 0753 by Paradise Vaughn RN  Outcome: Progressing  [address] (Taken 9/28/2023 0724)  Free From Fall Injury: Instruct family/caregiver on patient safety  9/27/2023 2035 by Frederick Burks RN  Outcome: Progressing     Problem: Pain  Goal: Verbalizes/displays adequate comfort level or baseline comfort level  9/28/2023 0753 by Paradise Vaughn RN  Outcome: Progressing  9/27/2023 2035 by Frederick Burks RN  Outcome: Progressing
Problem: Discharge Planning  Goal: Discharge to home or other facility with appropriate resources  Outcome: Progressing     Problem: ABCDS Injury Assessment  Goal: Absence of physical injury  Outcome: Progressing
Problem: Discharge Planning  Goal: Discharge to home or other facility with appropriate resources  Outcome: Progressing     Problem: ABCDS Injury Assessment  Goal: Absence of physical injury  Outcome: Progressing     Problem: Chronic Conditions and Co-morbidities  Goal: Patient's chronic conditions and co-morbidity symptoms are monitored and maintained or improved  Outcome: Progressing     Problem: Safety - Adult  Goal: Free from fall injury  Outcome: Progressing  Flowsheets (Taken 9/27/2023 0486)  Free From Fall Injury: Instruct family/caregiver on patient safety     Problem: Pain  Goal: Verbalizes/displays adequate comfort level or baseline comfort level  Outcome: Progressing
Incisions, wounds, or drain sites healing without S/S of infection  Outcome: Progressing  Flowsheets (Taken 10/5/2023 0710)  Incisions, Wounds, or Drain Sites Healing Without Sign and Symptoms of Infection: ADMISSION and DAILY: Assess and document risk factors for pressure ulcer development  Goal: Oral mucous membranes remain intact  Outcome: Progressing  Flowsheets (Taken 10/5/2023 0710)  Oral Mucous Membranes Remain Intact: Assess oral mucosa and hygiene practices     Problem: Musculoskeletal - Adult  Goal: Return mobility to safest level of function  Outcome: Progressing  Flowsheets (Taken 10/5/2023 0710)  Return Mobility to Safest Level of Function: Assess patient stability and activity tolerance for standing, transferring and ambulating with or without assistive devices  Goal: Maintain proper alignment of affected body part  Outcome: Progressing  Flowsheets (Taken 10/5/2023 0710)  Maintain proper alignment of affected body part: Support and protect limb and body alignment per provider's orders  Goal: Return ADL status to a safe level of function  Outcome: Progressing  Flowsheets (Taken 10/5/2023 0710)  Return ADL Status to a Safe Level of Function: Administer medication as ordered     Problem: Gastrointestinal - Adult  Goal: Minimal or absence of nausea and vomiting  Outcome: Progressing  Flowsheets (Taken 10/5/2023 0710)  Minimal or absence of nausea and vomiting: Administer IV fluids as ordered to ensure adequate hydration  Goal: Maintains or returns to baseline bowel function  Outcome: Progressing  Flowsheets (Taken 10/5/2023 0710)  Maintains or returns to baseline bowel function: Assess bowel function  Goal: Maintains adequate nutritional intake  Outcome: Progressing  Flowsheets (Taken 10/5/2023 0710)  Maintains adequate nutritional intake: Monitor percentage of each meal consumed     Problem: Genitourinary - Adult  Goal: Absence of urinary retention  Outcome: Progressing     Problem: Infection -

## 2023-10-05 NOTE — PROGRESS NOTES
Physical Therapy Note:    Attempted to see patient this AM for physical therapy treatment  session. Patient is going into the bathroom and states he will be a while. Will follow and re-attempt as schedule permits/patient available.  Thank you,    Reji Arcos, [facility]     Rehab Caseload Tracker

## 2023-10-05 NOTE — PROGRESS NOTES
TRANSFER - OUT REPORT:    Verbal report given to ROYER Milligan on Christiano Aguilar  being transferred to [facility] for routine progression of patient care       Report consisted of patient's Situation, Background, Assessment and   Recommendations(SBAR). Information from the following report(s) Nurse Handoff Report was reviewed with the receiving nurse. Lines:   PICC Single Lumen 32/29/15 Left Cephalic (Active)   Central Line Being Utilized Yes 10/05/23 0710   Criteria for Appropriate Use Long term IV/antibiotic administration 10/05/23 0710   Site Assessment Clean, dry & intact 10/05/23 0710   Phlebitis Assessment No symptoms 10/05/23 0710   Infiltration Assessment 0 10/05/23 0710   Lumen Color/Status Flushed;Purple;Blood return noted 10/05/23 0710   External Catheter Length (cm) 0 cm 10/01/23 0715   Extremity Circumference (cm) 34 cm 10/01/23 0715   Line Care Connections checked and tightened 10/05/23 0710   Alcohol Cap Used Yes 10/05/23 0710   Date of Last Dressing Change 10/03/23 10/04/23 0755   Dressing Type Transparent w/CHG gel 10/05/23 0710   Dressing Status Clean, dry & intact 10/05/23 0710   Dressing Intervention New 09/29/23 0955        Opportunity for questions and clarification was provided.

## 2023-10-05 NOTE — CARE COORDINATION
Insurance approval received. Pt is medically cleared for dc today and will transfer to room 106D at [address] for STR services and wound care. RN to call report to #[phone]. Transport scheduled for 1630 through [address].  Pt has been notified of the dc and transport time and he is in agreement with this plan. CM remains available to assist as needed. 10/05/23 1234   Service Assessment   Patient Orientation Alert and Oriented   Cognition Alert   History Provided By Patient; Child/Family;Medical Record   Primary Caregiver Self   Support Systems Spouse/Significant Other;Family Members; [address] is: Legal Next of Kin   PCP Verified by CM Yes   Last Visit to PCP Within last year   Prior Functional Level Independent in ADLs/IADLs   Current Functional Level Assistance with the following:;Mobility   Can patient return to prior living arrangement No   Ability to make needs known: Good   Family able to assist with home care needs: No   Would you like for me to discuss the discharge plan with any other family members/significant others, and if so, who? No   Financial Resources Medicare; Other (Comment)  ([facility] commercial policy is primary)   Community Resources None   Social/Functional History   Lives With Spouse   Type of [facility]  One level   Home Access Level entry   [address] [facility] [address];Irving Arboleda Help From [address]   Transfer Assistance Independent   Active  Yes   Occupation Full time employment   Type of Occupation    Discharge Planning   Type of [address] Prior To Admission None   [address]   DME Ordered?  No   Potential

## 2023-10-05 NOTE — PROGRESS NOTES
Saw wound this morning. Amputation site stable no signs of infection. Wound VAC placed replace per team.  Vascular will still follow from the periphery please call with questions or concerns. If patient is discharged can follow-up in office in 2 weeks for postop check.     Milagros Stern

## 2023-10-10 ENCOUNTER — CLINICAL DOCUMENTATION (OUTPATIENT)
Dept: VASCULAR SURGERY | Age: 73
End: 2023-10-10

## 2023-10-10 NOTE — PROGRESS NOTES
Erin called from [address] and she reported the wound vac is dry with no drainage coming out, would we like to switch to wet to dry dressing. Per our NP,     Stay with the wound vac for know/ Do not discontinue wound vac. Erin voiced understanding.

## 2023-10-17 ENCOUNTER — OFFICE VISIT (OUTPATIENT)
Dept: VASCULAR SURGERY | Age: 73
End: 2023-10-17
Payer: COMMERCIAL

## 2023-10-17 ENCOUNTER — HOME HEALTH ADMISSION (OUTPATIENT)
Dept: HOME HEALTH SERVICES | Facility: HOME HEALTH | Age: 73
End: 2023-10-17
Payer: COMMERCIAL

## 2023-10-17 VITALS
SYSTOLIC BLOOD PRESSURE: 169 MMHG | HEIGHT: 68 IN | HEART RATE: 86 BPM | OXYGEN SATURATION: 98 % | BODY MASS INDEX: 27.89 KG/M2 | DIASTOLIC BLOOD PRESSURE: 84 MMHG | WEIGHT: 184 LBS

## 2023-10-17 DIAGNOSIS — E11.621 DIABETIC FOOT ULCER WITH OSTEOMYELITIS (HCC): Primary | ICD-10-CM

## 2023-10-17 DIAGNOSIS — L97.509 DIABETIC FOOT ULCER WITH OSTEOMYELITIS (HCC): Primary | ICD-10-CM

## 2023-10-17 DIAGNOSIS — E11.69 DIABETIC FOOT ULCER WITH OSTEOMYELITIS (HCC): Primary | ICD-10-CM

## 2023-10-17 DIAGNOSIS — M86.9 DIABETIC FOOT ULCER WITH OSTEOMYELITIS (HCC): Primary | ICD-10-CM

## 2023-10-17 PROCEDURE — 1123F ACP DISCUSS/DSCN MKR DOCD: CPT | Performed by: SURGERY

## 2023-10-17 PROCEDURE — 2022F DILAT RTA XM EVC RTNOPTHY: CPT | Performed by: SURGERY

## 2023-10-17 PROCEDURE — 3051F HG A1C>EQUAL 7.0%<8.0%: CPT | Performed by: SURGERY

## 2023-10-17 PROCEDURE — 3079F DIAST BP 80-89 MM HG: CPT | Performed by: SURGERY

## 2023-10-17 PROCEDURE — 3017F COLORECTAL CA SCREEN DOC REV: CPT | Performed by: SURGERY

## 2023-10-17 PROCEDURE — G8417 CALC BMI ABV UP PARAM F/U: HCPCS | Performed by: SURGERY

## 2023-10-17 PROCEDURE — 99213 OFFICE O/P EST LOW 20 MIN: CPT | Performed by: SURGERY

## 2023-10-17 PROCEDURE — 1111F DSCHRG MED/CURRENT MED MERGE: CPT | Performed by: SURGERY

## 2023-10-17 PROCEDURE — 1036F TOBACCO NON-USER: CPT | Performed by: SURGERY

## 2023-10-17 PROCEDURE — 3077F SYST BP >= 140 MM HG: CPT | Performed by: SURGERY

## 2023-10-17 PROCEDURE — G8484 FLU IMMUNIZE NO ADMIN: HCPCS | Performed by: SURGERY

## 2023-10-17 PROCEDURE — G8427 DOCREV CUR MEDS BY ELIG CLIN: HCPCS | Performed by: SURGERY

## 2023-10-17 NOTE — PROGRESS NOTES
errors of speech recognition may have occurred.     20 minutes of time was spent on this encounter including chart review, assessment, evaluation and coordination of patient care

## 2023-10-19 ENCOUNTER — TELEPHONE (OUTPATIENT)
Dept: VASCULAR SURGERY | Age: 73
End: 2023-10-19

## 2023-10-19 ENCOUNTER — HOME CARE VISIT (OUTPATIENT)
Dept: SCHEDULING | Facility: HOME HEALTH | Age: 73
End: 2023-10-19
Payer: COMMERCIAL

## 2023-10-19 VITALS
WEIGHT: 183 LBS | OXYGEN SATURATION: 97 % | HEART RATE: 72 BPM | SYSTOLIC BLOOD PRESSURE: 140 MMHG | DIASTOLIC BLOOD PRESSURE: 88 MMHG | HEIGHT: 68 IN | BODY MASS INDEX: 27.74 KG/M2 | TEMPERATURE: 97.6 F | RESPIRATION RATE: 18 BRPM

## 2023-10-19 PROCEDURE — G0299 HHS/HOSPICE OF RN EA 15 MIN: HCPCS

## 2023-10-19 PROCEDURE — 0221000100 HH NO PAY CLAIM PROCEDURE

## 2023-10-19 ASSESSMENT — ENCOUNTER SYMPTOMS: DYSPNEA ACTIVITY LEVEL: AFTER AMBULATING MORE THAN 20 FT

## 2023-10-19 NOTE — TELEPHONE ENCOUNTER
Home Health nurse called stating that they are unable to do daily wound care for patient. They usually do only 3 days a week. He states that patient's wife is at inpatient rehab so there is not anyone who can do the care on the other days. I asked if home health could go to patient home 5 days in the week, and he said they could not. Patient is ok with doing wound care only 3 times a week. Will inform provider.

## 2023-10-21 ENCOUNTER — HOME CARE VISIT (OUTPATIENT)
Dept: SCHEDULING | Facility: HOME HEALTH | Age: 73
End: 2023-10-21
Payer: COMMERCIAL

## 2023-10-21 VITALS
RESPIRATION RATE: 18 BRPM | SYSTOLIC BLOOD PRESSURE: 136 MMHG | OXYGEN SATURATION: 100 % | TEMPERATURE: 98.2 F | HEART RATE: 61 BPM | DIASTOLIC BLOOD PRESSURE: 81 MMHG

## 2023-10-21 PROCEDURE — G0299 HHS/HOSPICE OF RN EA 15 MIN: HCPCS

## 2023-10-23 ENCOUNTER — HOME CARE VISIT (OUTPATIENT)
Dept: SCHEDULING | Facility: HOME HEALTH | Age: 73
End: 2023-10-23
Payer: COMMERCIAL

## 2023-10-23 VITALS
OXYGEN SATURATION: 98 % | DIASTOLIC BLOOD PRESSURE: 84 MMHG | SYSTOLIC BLOOD PRESSURE: 136 MMHG | TEMPERATURE: 97.3 F | HEART RATE: 76 BPM | RESPIRATION RATE: 17 BRPM

## 2023-10-23 PROCEDURE — G0299 HHS/HOSPICE OF RN EA 15 MIN: HCPCS

## 2023-10-23 ASSESSMENT — ENCOUNTER SYMPTOMS
DYSPNEA ACTIVITY LEVEL: AFTER AMBULATING MORE THAN 20 FT
STOOL DESCRIPTION: FORMED

## 2023-10-25 ENCOUNTER — HOME CARE VISIT (OUTPATIENT)
Dept: SCHEDULING | Facility: HOME HEALTH | Age: 73
End: 2023-10-25
Payer: COMMERCIAL

## 2023-10-25 VITALS
OXYGEN SATURATION: 97 % | TEMPERATURE: 97.6 F | DIASTOLIC BLOOD PRESSURE: 83 MMHG | HEART RATE: 71 BPM | SYSTOLIC BLOOD PRESSURE: 128 MMHG | RESPIRATION RATE: 18 BRPM

## 2023-10-25 PROCEDURE — G0299 HHS/HOSPICE OF RN EA 15 MIN: HCPCS

## 2023-10-27 ENCOUNTER — HOME CARE VISIT (OUTPATIENT)
Dept: SCHEDULING | Facility: HOME HEALTH | Age: 73
End: 2023-10-27
Payer: COMMERCIAL

## 2023-10-27 VITALS
OXYGEN SATURATION: 99 % | SYSTOLIC BLOOD PRESSURE: 130 MMHG | DIASTOLIC BLOOD PRESSURE: 85 MMHG | RESPIRATION RATE: 18 BRPM | HEART RATE: 73 BPM | TEMPERATURE: 97.4 F

## 2023-10-27 PROCEDURE — G0299 HHS/HOSPICE OF RN EA 15 MIN: HCPCS

## 2023-10-30 ENCOUNTER — HOME CARE VISIT (OUTPATIENT)
Dept: SCHEDULING | Facility: HOME HEALTH | Age: 73
End: 2023-10-30
Payer: COMMERCIAL

## 2023-10-30 VITALS
OXYGEN SATURATION: 98 % | DIASTOLIC BLOOD PRESSURE: 70 MMHG | HEART RATE: 78 BPM | RESPIRATION RATE: 18 BRPM | SYSTOLIC BLOOD PRESSURE: 100 MMHG | TEMPERATURE: 97.7 F

## 2023-10-30 PROCEDURE — G0299 HHS/HOSPICE OF RN EA 15 MIN: HCPCS

## 2023-10-30 ASSESSMENT — ENCOUNTER SYMPTOMS: HEMOPTYSIS: 0

## 2023-11-01 ENCOUNTER — HOME CARE VISIT (OUTPATIENT)
Dept: SCHEDULING | Facility: HOME HEALTH | Age: 73
End: 2023-11-01
Payer: COMMERCIAL

## 2023-11-01 PROCEDURE — G0299 HHS/HOSPICE OF RN EA 15 MIN: HCPCS

## 2023-11-02 VITALS
HEART RATE: 71 BPM | RESPIRATION RATE: 18 BRPM | DIASTOLIC BLOOD PRESSURE: 86 MMHG | SYSTOLIC BLOOD PRESSURE: 129 MMHG | TEMPERATURE: 97.9 F | OXYGEN SATURATION: 99 %

## 2023-11-03 ENCOUNTER — HOME CARE VISIT (OUTPATIENT)
Dept: SCHEDULING | Facility: HOME HEALTH | Age: 73
End: 2023-11-03
Payer: COMMERCIAL

## 2023-11-03 VITALS
OXYGEN SATURATION: 99 % | SYSTOLIC BLOOD PRESSURE: 120 MMHG | RESPIRATION RATE: 18 BRPM | DIASTOLIC BLOOD PRESSURE: 62 MMHG | HEART RATE: 74 BPM | TEMPERATURE: 98.2 F

## 2023-11-03 PROCEDURE — G0299 HHS/HOSPICE OF RN EA 15 MIN: HCPCS

## 2023-11-06 ENCOUNTER — HOME CARE VISIT (OUTPATIENT)
Dept: SCHEDULING | Facility: HOME HEALTH | Age: 73
End: 2023-11-06
Payer: COMMERCIAL

## 2023-11-06 PROCEDURE — G0299 HHS/HOSPICE OF RN EA 15 MIN: HCPCS

## 2023-11-07 VITALS
OXYGEN SATURATION: 100 % | SYSTOLIC BLOOD PRESSURE: 132 MMHG | TEMPERATURE: 97.5 F | HEART RATE: 70 BPM | RESPIRATION RATE: 18 BRPM | DIASTOLIC BLOOD PRESSURE: 78 MMHG

## 2023-11-07 ASSESSMENT — ENCOUNTER SYMPTOMS: STOOL DESCRIPTION: SOFT FORMED

## 2023-11-07 NOTE — PROGRESS NOTES
Hospitalist Progress Note   Admit Date:  2023  6:22 PM   Name:  Lexis Mccann   Age:  68 y.o. Sex:  male  :  1950   MRN:  [id number]   Room:  [facility]/    Presenting/Chief Complaint: No chief complaint on file. Reason(s) for Admission: Bacteremia due to Staphylococcus [R78.81, B95.8]     Hospital Course:     Lexis Mccann is a 68 y.o. male with medical history of HTN, Dm2, admitted with right 2nd toe wound. Found with MRSA bacteremia and sepsis as well as right DM2 foot ulcer. Osteomyelitis right 2nd toe    Wound vac placed, off loading boot needed     Seen by vascular s/p amputation right 2nd toe     ID followed MRSA bacteremia, EOT vancomycin 10-10-23, then doxycycline for EOT 23     PICC placed       TTE no IE      He had WILL, since resolved      Discharge plans pending SNF        Subjective & 24hr Events:           Assessment & Plan:     Principal Problem:    Bacteremia due to Staphylococcus  Plan:  Right 2nd toe OM:  S/p right 2nd toe amputation  ID following  IV vancomycin EOT 10-1-23 then oral doxycycline EOT 23   Wound vac  Off loading boot  Pending SNF        WILL:  Resolved        Anemia:  Trend HGB         HTN:  Norvasc  Lisinopril         DM2:  SSI            PT/OT evals and PPD needed/ordered? Yes  Diet:  No diet orders on file  VTE prophylaxis: Lovenox  Code status: Full Code      Non-peripheral Lines and Tubes (if present):                  Telemetry (if present):           Hospital Problems:  Principal Problem:    MRSA bacteremia  Active Problems:    Sepsis due to methicillin resistant Staphylococcus aureus (MRSA) ([address])    WILL (acute kidney injury) ([address])    Type 2 diabetes mellitus with hyperglycemia, without long-term current use of insulin (HCC)    Osteomyelitis of second toe of right foot (HCC)    Amputation of toe of right foot ([address])    Diabetic foot infection ([address])    Normocytic anemia  Resolved Problems:    * No resolved hospital problems.

## 2023-11-08 ENCOUNTER — HOME CARE VISIT (OUTPATIENT)
Dept: SCHEDULING | Facility: HOME HEALTH | Age: 73
End: 2023-11-08
Payer: COMMERCIAL

## 2023-11-08 VITALS
DIASTOLIC BLOOD PRESSURE: 87 MMHG | RESPIRATION RATE: 18 BRPM | TEMPERATURE: 98 F | OXYGEN SATURATION: 96 % | HEART RATE: 65 BPM | SYSTOLIC BLOOD PRESSURE: 127 MMHG

## 2023-11-08 PROCEDURE — G0299 HHS/HOSPICE OF RN EA 15 MIN: HCPCS

## 2023-11-10 ENCOUNTER — HOME CARE VISIT (OUTPATIENT)
Dept: SCHEDULING | Facility: HOME HEALTH | Age: 73
End: 2023-11-10
Payer: COMMERCIAL

## 2023-11-10 VITALS
RESPIRATION RATE: 18 BRPM | DIASTOLIC BLOOD PRESSURE: 75 MMHG | HEART RATE: 66 BPM | SYSTOLIC BLOOD PRESSURE: 130 MMHG | TEMPERATURE: 97.9 F | OXYGEN SATURATION: 97 %

## 2023-11-10 PROCEDURE — G0299 HHS/HOSPICE OF RN EA 15 MIN: HCPCS

## 2023-11-13 ENCOUNTER — HOME CARE VISIT (OUTPATIENT)
Dept: SCHEDULING | Facility: HOME HEALTH | Age: 73
End: 2023-11-13
Payer: COMMERCIAL

## 2023-11-13 VITALS
HEART RATE: 86 BPM | RESPIRATION RATE: 16 BRPM | DIASTOLIC BLOOD PRESSURE: 60 MMHG | TEMPERATURE: 97.6 F | OXYGEN SATURATION: 98 % | SYSTOLIC BLOOD PRESSURE: 126 MMHG

## 2023-11-13 PROCEDURE — G0299 HHS/HOSPICE OF RN EA 15 MIN: HCPCS

## 2023-11-13 ASSESSMENT — ENCOUNTER SYMPTOMS: STOOL DESCRIPTION: SOFT FORMED

## 2023-11-15 ENCOUNTER — HOSPITAL ENCOUNTER (EMERGENCY)
Age: 73
Discharge: HOME | End: 2023-11-15
Attending: EMERGENCY MEDICINE
Payer: COMMERCIAL

## 2023-11-15 VITALS
RESPIRATION RATE: 17 BRPM | OXYGEN SATURATION: 100 % | TEMPERATURE: 97.9 F | HEART RATE: 99 BPM | BODY MASS INDEX: 27.28 KG/M2 | DIASTOLIC BLOOD PRESSURE: 82 MMHG | SYSTOLIC BLOOD PRESSURE: 136 MMHG | HEIGHT: 68 IN | WEIGHT: 180 LBS

## 2023-11-15 DIAGNOSIS — R33.8 ACUTE URINARY RETENTION: Primary | ICD-10-CM

## 2023-11-15 LAB
ANION GAP SERPL CALC-SCNC: 15 MMOL/L (ref 2–11)
APPEARANCE UR: CLEAR
BACTERIA URNS QL MICRO: ABNORMAL /HPF
BASOPHILS # BLD: 0 K/UL (ref 0–0.2)
BASOPHILS NFR BLD: 0 % (ref 0–2)
BILIRUB UR QL: NEGATIVE
BUN SERPL-MCNC: 45 MG/DL (ref 8–23)
CALCIUM SERPL-MCNC: 9.5 MG/DL (ref 8.3–10.4)
CHLORIDE SERPL-SCNC: 93 MMOL/L (ref 101–110)
CO2 SERPL-SCNC: 21 MMOL/L (ref 21–32)
COLOR UR: ABNORMAL
CREAT SERPL-MCNC: 1.4 MG/DL (ref 0.8–1.5)
DIFFERENTIAL METHOD BLD: ABNORMAL
EOSINOPHIL # BLD: 0 K/UL (ref 0–0.8)
EOSINOPHIL NFR BLD: 0 % (ref 0.5–7.8)
EPI CELLS #/AREA URNS HPF: ABNORMAL /HPF
ERYTHROCYTE [DISTWIDTH] IN BLOOD BY AUTOMATED COUNT: 14.7 % (ref 11.9–14.6)
GLUCOSE SERPL-MCNC: 169 MG/DL (ref 65–100)
GLUCOSE UR STRIP.AUTO-MCNC: NEGATIVE MG/DL
HCT VFR BLD AUTO: 37.3 % (ref 41.1–50.3)
HGB BLD-MCNC: 11.9 G/DL (ref 13.6–17.2)
HGB UR QL STRIP: ABNORMAL
IMM GRANULOCYTES # BLD AUTO: 0.1 K/UL (ref 0–0.5)
IMM GRANULOCYTES NFR BLD AUTO: 0 % (ref 0–5)
KETONES UR QL STRIP.AUTO: 15 MG/DL
LEUKOCYTE ESTERASE UR QL STRIP.AUTO: NEGATIVE
LYMPHOCYTES # BLD: 1 K/UL (ref 0.5–4.6)
LYMPHOCYTES NFR BLD: 7 % (ref 13–44)
MCH RBC QN AUTO: 26.4 PG (ref 26.1–32.9)
MCHC RBC AUTO-ENTMCNC: 31.9 G/DL (ref 31.4–35)
MCV RBC AUTO: 82.9 FL (ref 82–102)
MONOCYTES # BLD: 0.4 K/UL (ref 0.1–1.3)
MONOCYTES NFR BLD: 3 % (ref 4–12)
NEUTS SEG # BLD: 12.4 K/UL (ref 1.7–8.2)
NEUTS SEG NFR BLD: 90 % (ref 43–78)
NITRITE UR QL STRIP.AUTO: NEGATIVE
NRBC # BLD: 0 K/UL (ref 0–0.2)
OTHER OBSERVATIONS: ABNORMAL
PH UR STRIP: 5 (ref 5–9)
PLATELET # BLD AUTO: 460 K/UL (ref 150–450)
PMV BLD AUTO: 10.6 FL (ref 9.4–12.3)
POTASSIUM SERPL-SCNC: 4.5 MMOL/L (ref 3.5–5.1)
PROT UR STRIP-MCNC: ABNORMAL MG/DL
RBC # BLD AUTO: 4.5 M/UL (ref 4.23–5.6)
RBC #/AREA URNS HPF: ABNORMAL /HPF
SODIUM SERPL-SCNC: 129 MMOL/L (ref 133–143)
SP GR UR REFRACTOMETRY: 1.01 (ref 1–1.02)
UROBILINOGEN UR QL STRIP.AUTO: 0.2 EU/DL (ref 0.2–1)
WBC # BLD AUTO: 13.9 K/UL (ref 4.3–11.1)
WBC URNS QL MICRO: ABNORMAL /HPF

## 2023-11-15 PROCEDURE — 99283 EMERGENCY DEPT VISIT LOW MDM: CPT

## 2023-11-15 PROCEDURE — 81001 URINALYSIS AUTO W/SCOPE: CPT

## 2023-11-15 PROCEDURE — 51702 INSERT TEMP BLADDER CATH: CPT

## 2023-11-15 PROCEDURE — 80048 BASIC METABOLIC PNL TOTAL CA: CPT

## 2023-11-15 PROCEDURE — 85025 COMPLETE CBC W/AUTO DIFF WBC: CPT

## 2023-11-15 RX ORDER — TAMSULOSIN HYDROCHLORIDE 0.4 MG/1
0.4 CAPSULE ORAL DAILY
Qty: 30 CAPSULE | Refills: 0 | Status: SHIPPED | OUTPATIENT
Start: 2023-11-15

## 2023-11-15 RX ORDER — TAMSULOSIN HYDROCHLORIDE 0.4 MG/1
0.4 CAPSULE ORAL DAILY
Qty: 7 CAPSULE | Refills: 0 | Status: SHIPPED | OUTPATIENT
Start: 2023-11-15 | End: 2023-11-15 | Stop reason: SDUPTHER

## 2023-11-15 ASSESSMENT — LIFESTYLE VARIABLES
HOW OFTEN DO YOU HAVE A DRINK CONTAINING ALCOHOL: NEVER
HOW MANY STANDARD DRINKS CONTAINING ALCOHOL DO YOU HAVE ON A TYPICAL DAY: PATIENT DOES NOT DRINK

## 2023-11-15 ASSESSMENT — PAIN DESCRIPTION - FREQUENCY: FREQUENCY: CONTINUOUS

## 2023-11-15 ASSESSMENT — PAIN - FUNCTIONAL ASSESSMENT: PAIN_FUNCTIONAL_ASSESSMENT: 0-10

## 2023-11-15 ASSESSMENT — PAIN DESCRIPTION - DESCRIPTORS: DESCRIPTORS: ACHING;DISCOMFORT

## 2023-11-15 ASSESSMENT — PAIN SCALES - GENERAL: PAINLEVEL_OUTOF10: 9

## 2023-11-15 ASSESSMENT — PAIN DESCRIPTION - PAIN TYPE: TYPE: ACUTE PAIN

## 2023-11-15 ASSESSMENT — PAIN DESCRIPTION - LOCATION: LOCATION: PENIS

## 2023-11-15 NOTE — ED PROVIDER NOTES
Emergency Department Provider Note       PCP: Tammy Smith MD   Age: 68 y.o. Sex: male     DISPOSITION Decision To Discharge 11/15/2023 12:56:30 PM       ICD-10-CM    1. Acute urinary retention  R33.8 [address] [place] [address] Urology, [facility] Decision Making     Complexity of Problems Addressed:  1 or more acute illnesses that pose a threat to life or bodily function. Data Reviewed and Analyzed:   I independently ordered and reviewed each unique test.  I reviewed external records: provider visit note from PCP. I reviewed external records: provider visit note from outside specialist.             Discussion of management or test interpretation. 10:36 AM EST  Catheter placed with 900cc of urine out. Will plan to place leg bag and give fu with urology after urinalysis results. No UTI. We will place the patient on a course of Flomax. Referral placed to urology. Risk of Complications and/or Morbidity of Patient Management:  Prescription drug management performed. History      67-year-old male presenting to the emergency department complaint of difficulty urinating. Patient states over the last 2 to 3 days he has had difficulty emptying his bladder. He states it is uncomfortable when he tries to urinate and he has a very small amount of urine that comes out. He denies any significant abdominal pain or pressure though he does feel like he needs to urinate. He denies history of prostatic enlargement. He has not seen a urologist in the past.  Otherwise he notes over the weekend that he felt like he had a viral infection with some diarrhea, and some sweats and chills. He denies any new medications. He is taking doxycycline, though the exact reason is not clear. He denies chest pain, back pain, and ultimately abdominal pain as well. He has attempted to urinate while waiting in the waiting room, and has about 1 mL of urine and a urine specimen cup.

## 2023-11-15 NOTE — ED TRIAGE NOTES
Pt reports urethral pain and inability to urinate, pain started last night.  Reports only slight \"dribble\" urination in last 24hours   (-)hematuria    A&Ox4

## 2023-11-16 ENCOUNTER — HOME CARE VISIT (OUTPATIENT)
Dept: SCHEDULING | Facility: HOME HEALTH | Age: 73
End: 2023-11-16
Payer: COMMERCIAL

## 2023-11-16 VITALS
HEART RATE: 79 BPM | DIASTOLIC BLOOD PRESSURE: 63 MMHG | SYSTOLIC BLOOD PRESSURE: 124 MMHG | RESPIRATION RATE: 18 BRPM | TEMPERATURE: 98.1 F

## 2023-11-16 PROCEDURE — G0299 HHS/HOSPICE OF RN EA 15 MIN: HCPCS

## 2023-11-18 ENCOUNTER — HOME CARE VISIT (OUTPATIENT)
Dept: SCHEDULING | Facility: HOME HEALTH | Age: 73
End: 2023-11-18
Payer: COMMERCIAL

## 2023-11-18 VITALS
OXYGEN SATURATION: 97 % | DIASTOLIC BLOOD PRESSURE: 74 MMHG | TEMPERATURE: 98 F | RESPIRATION RATE: 18 BRPM | SYSTOLIC BLOOD PRESSURE: 129 MMHG | HEART RATE: 76 BPM

## 2023-11-18 PROCEDURE — G0299 HHS/HOSPICE OF RN EA 15 MIN: HCPCS

## 2023-11-20 ENCOUNTER — HOME CARE VISIT (OUTPATIENT)
Dept: SCHEDULING | Facility: HOME HEALTH | Age: 73
End: 2023-11-20
Payer: COMMERCIAL

## 2023-11-20 ENCOUNTER — TELEPHONE (OUTPATIENT)
Dept: FAMILY MEDICINE CLINIC | Facility: CLINIC | Age: 73
End: 2023-11-20

## 2023-11-20 NOTE — TELEPHONE ENCOUNTER
Informed pt that the Er Dr. Mari Lester already sent 30 tablets in to his pharmacy. Pt will check with pharmacy and call if there are any issues.

## 2023-11-20 NOTE — TELEPHONE ENCOUNTER
Patient was seen in the ER and given medication along with referral to urology. However, urology can't see him until next week and he'll be out of the medication. He needs refill for Tamsulosin HCL to be sent to [facility] on [address]. This should last him until he can see urology.

## 2023-11-21 ENCOUNTER — OFFICE VISIT (OUTPATIENT)
Dept: VASCULAR SURGERY | Age: 73
End: 2023-11-21
Payer: COMMERCIAL

## 2023-11-21 VITALS
OXYGEN SATURATION: 99 % | DIASTOLIC BLOOD PRESSURE: 80 MMHG | HEIGHT: 68 IN | HEART RATE: 93 BPM | BODY MASS INDEX: 27.43 KG/M2 | SYSTOLIC BLOOD PRESSURE: 151 MMHG | WEIGHT: 181 LBS

## 2023-11-21 DIAGNOSIS — I73.9 PVD (PERIPHERAL VASCULAR DISEASE) WITH CLAUDICATION (HCC): Primary | ICD-10-CM

## 2023-11-21 PROCEDURE — 3017F COLORECTAL CA SCREEN DOC REV: CPT | Performed by: SURGERY

## 2023-11-21 PROCEDURE — 1123F ACP DISCUSS/DSCN MKR DOCD: CPT | Performed by: SURGERY

## 2023-11-21 PROCEDURE — G8484 FLU IMMUNIZE NO ADMIN: HCPCS | Performed by: SURGERY

## 2023-11-21 PROCEDURE — 3077F SYST BP >= 140 MM HG: CPT | Performed by: SURGERY

## 2023-11-21 PROCEDURE — 3079F DIAST BP 80-89 MM HG: CPT | Performed by: SURGERY

## 2023-11-21 PROCEDURE — 99213 OFFICE O/P EST LOW 20 MIN: CPT | Performed by: SURGERY

## 2023-11-21 PROCEDURE — G8427 DOCREV CUR MEDS BY ELIG CLIN: HCPCS | Performed by: SURGERY

## 2023-11-21 PROCEDURE — G8417 CALC BMI ABV UP PARAM F/U: HCPCS | Performed by: SURGERY

## 2023-11-21 PROCEDURE — 1036F TOBACCO NON-USER: CPT | Performed by: SURGERY

## 2023-11-21 NOTE — PROGRESS NOTES
[address]   [address] [facility]. [address] [place] [address]  FAX: [phone]    Malia Floyd  : 1950    Chief Complaint:     History of Present Illness:   Patient follows up today for follow-up status post right second toe amputation. Patient denies any fever chills. CURRENT MEDICATIONS:  Current Outpatient Medications   Medication Sig Dispense Refill    tamsulosin (FLOMAX) 0.4 MG capsule Take 1 capsule by mouth daily 30 capsule 0    amLODIPine-benazepril (LOTREL) 5-20 MG per capsule Take 1 capsule by mouth daily 90 capsule 3     No current facility-administered medications for this visit. Past Medical History:   Diagnosis Date    Basal cell carcinoma 2014    Erectile dysfunction     Hypertension     Posterior tibial tendon dysfunction (PTTD) of left lower extremity 2022    Pressure injury of left foot, stage 3 ([address]) 2022       Physical Examination:   Height: 1.727 m (5' 8\"), Weight - Scale: 82.1 kg (181 lb), BP: (!) 151/80    Constitutional: he appears well-developed. No distress. HENT:   Head: Atraumatic. Eyes: Pupils are equal, round, and reactive to light. Neck: Normal range of motion. Cardiovascular: Regular rhythm. Pulmonary/Chest: Effort normal and breath sounds normal. No respiratory distress. Abdominal: Soft. Bowel sounds are normal. he exhibits no distension. There is no tenderness. There is no guarding. No hernia. Musculoskeletal: Normal range of motion. Neurological: He is alert. CN II- XII grossly intact   Vascular: Incision intact no signs of infection    Imaging:          Recommendations/Plans:   Mr. Malia Floyd is a 68y.o. year old male status post right second toe amputation. Wound is stable no signs infection pressure follow-up as needed. Oscar Lennon MD    Elements of this note have been dictated using speech recognition software. As a result, errors of speech recognition may have occurred.     20 minutes of time

## 2023-11-22 ENCOUNTER — HOME CARE VISIT (OUTPATIENT)
Dept: SCHEDULING | Facility: HOME HEALTH | Age: 73
End: 2023-11-22
Payer: COMMERCIAL

## 2023-11-24 ENCOUNTER — HOME CARE VISIT (OUTPATIENT)
Dept: SCHEDULING | Facility: HOME HEALTH | Age: 73
End: 2023-11-24
Payer: COMMERCIAL

## 2023-11-24 VITALS
HEART RATE: 72 BPM | RESPIRATION RATE: 18 BRPM | OXYGEN SATURATION: 98 % | SYSTOLIC BLOOD PRESSURE: 137 MMHG | DIASTOLIC BLOOD PRESSURE: 88 MMHG | TEMPERATURE: 97.6 F

## 2023-11-24 PROCEDURE — G0299 HHS/HOSPICE OF RN EA 15 MIN: HCPCS

## 2023-11-27 ENCOUNTER — OFFICE VISIT (OUTPATIENT)
Dept: UROLOGY | Age: 73
End: 2023-11-27
Payer: COMMERCIAL

## 2023-11-27 DIAGNOSIS — R33.8 ACUTE URINARY RETENTION: Primary | ICD-10-CM

## 2023-11-27 LAB — PVR, POC: 537 CC

## 2023-11-27 PROCEDURE — G8427 DOCREV CUR MEDS BY ELIG CLIN: HCPCS | Performed by: NURSE PRACTITIONER

## 2023-11-27 PROCEDURE — 99203 OFFICE O/P NEW LOW 30 MIN: CPT | Performed by: NURSE PRACTITIONER

## 2023-11-27 PROCEDURE — 3017F COLORECTAL CA SCREEN DOC REV: CPT | Performed by: NURSE PRACTITIONER

## 2023-11-27 PROCEDURE — G8417 CALC BMI ABV UP PARAM F/U: HCPCS | Performed by: NURSE PRACTITIONER

## 2023-11-27 PROCEDURE — 1036F TOBACCO NON-USER: CPT | Performed by: NURSE PRACTITIONER

## 2023-11-27 PROCEDURE — 1123F ACP DISCUSS/DSCN MKR DOCD: CPT | Performed by: NURSE PRACTITIONER

## 2023-11-27 PROCEDURE — 51798 US URINE CAPACITY MEASURE: CPT | Performed by: NURSE PRACTITIONER

## 2023-11-27 PROCEDURE — G8484 FLU IMMUNIZE NO ADMIN: HCPCS | Performed by: NURSE PRACTITIONER

## 2023-11-27 ASSESSMENT — ENCOUNTER SYMPTOMS
CONSTIPATION: 1
EYE DISCHARGE: 0
SKIN LESIONS: 1
WHEEZING: 0
ABDOMINAL PAIN: 0
NAUSEA: 1
EYE PAIN: 0
SHORTNESS OF BREATH: 0
INDIGESTION: 1
BACK PAIN: 1
BLOOD IN STOOL: 0
COUGH: 0
VOMITING: 1
HEARTBURN: 1
DIARRHEA: 1

## 2023-11-27 NOTE — PROGRESS NOTES
cc via US in office today    Patient unable to void. Patient placed in supine position and the urethral meatus was cleansed with antiseptic. 16 fr coude indwelling catheter was placed without incident. Urine returned from the catheter no bleeding, no resistance. Catheter balloon inflated and was secured per protocol. The patient tolerated the procedure well. Cont Flomax 0.4 mg PO daily. RTO next week for VT. Keep OV as arranged. Advised to call sooner if needed.     Venkat Hugehs, SHEFALI - CNP

## 2023-11-27 NOTE — PROGRESS NOTES
[facility]  [facility], [address] [place] [address]  [phone]          Jamila Rodriguez  : 1950    Chief Complaint   Patient presents with    New Patient    Urinary Retention          HPI     Jamila Rodriguez is a 68 y.o. male who presented to the ER 11/15/23 for inability to void. He had been suffering w viral illness prior to onset of urinary issues. He noticed decreased urinating 2 days prior to presenting to the ER. He had PVR of 900 cc. Leung cath placed. Started on Flomax. NO prior h/o AUR. No h/o BPH or LUTS at baseline. No personal or family  h/o urological CA. Non-smoker. His wife is currently hospitalized at [facility]. Past Medical History:   Diagnosis Date    Basal cell carcinoma 2014    Erectile dysfunction     Hypertension     Posterior tibial tendon dysfunction (PTTD) of left lower extremity 2022    Pressure injury of left foot, stage 3 ([address]) 2022     Past Surgical History:   Procedure Laterality Date    HEENT      tonsillectomy    MALIGNANT SKIN LESION EXCISION  2014    left ear lesion; Basal cell carcinoma    TOE AMPUTATION Right 2023    RIGHT SECOND TOE AMPUTATION performed by Negin Kenyon MD at [facility] MAIN OR     Current Outpatient Medications   Medication Sig Dispense Refill    tamsulosin (FLOMAX) 0.4 MG capsule Take 1 capsule by mouth daily 30 capsule 0    amLODIPine-benazepril (LOTREL) 5-20 MG per capsule Take 1 capsule by mouth daily 90 capsule 3     No current facility-administered medications for this visit.      No Known Allergies  Social History     Socioeconomic History    Marital status:      Spouse name: Not on file    Number of children: Not on file    Years of education: Not on file    Highest education level: Not on file   Occupational History    Not on file   Tobacco Use    Smoking status: Never    Smokeless tobacco: Never   Substance and Sexual Activity    Alcohol use: No    Drug use: Never    Sexual

## 2023-12-07 ENCOUNTER — NURSE ONLY (OUTPATIENT)
Dept: UROLOGY | Age: 73
End: 2023-12-07

## 2023-12-07 DIAGNOSIS — R33.8 ACUTE URINARY RETENTION: Primary | ICD-10-CM

## 2023-12-07 NOTE — PROGRESS NOTES
Pt came in for catheter removal. 16f reed catheter removed without incident. Pt instructed to push fluids, 6-8 glasses of water and if he has not voided on his own by 3:00pm to call us and come in this afternoon for reinsertion.

## 2023-12-08 ENCOUNTER — TELEPHONE (OUTPATIENT)
Dept: VASCULAR SURGERY | Age: 73
End: 2023-12-08

## 2023-12-08 NOTE — TELEPHONE ENCOUNTER
Pt having issues w/great toe (same issues as he had w/toe that amputated)  Made appt for 12-12-23 per Emma Goodwin no u/s needed

## 2023-12-12 ENCOUNTER — OFFICE VISIT (OUTPATIENT)
Dept: VASCULAR SURGERY | Age: 73
End: 2023-12-12
Payer: COMMERCIAL

## 2023-12-12 VITALS
SYSTOLIC BLOOD PRESSURE: 143 MMHG | OXYGEN SATURATION: 98 % | BODY MASS INDEX: 27.43 KG/M2 | HEIGHT: 68 IN | WEIGHT: 181 LBS | HEART RATE: 89 BPM | DIASTOLIC BLOOD PRESSURE: 76 MMHG

## 2023-12-12 DIAGNOSIS — I73.9 PVD (PERIPHERAL VASCULAR DISEASE) WITH CLAUDICATION (HCC): Primary | ICD-10-CM

## 2023-12-12 PROCEDURE — 3017F COLORECTAL CA SCREEN DOC REV: CPT | Performed by: SURGERY

## 2023-12-12 PROCEDURE — 3078F DIAST BP <80 MM HG: CPT | Performed by: SURGERY

## 2023-12-12 PROCEDURE — 1036F TOBACCO NON-USER: CPT | Performed by: SURGERY

## 2023-12-12 PROCEDURE — 3077F SYST BP >= 140 MM HG: CPT | Performed by: SURGERY

## 2023-12-12 PROCEDURE — 1123F ACP DISCUSS/DSCN MKR DOCD: CPT | Performed by: SURGERY

## 2023-12-12 PROCEDURE — G8484 FLU IMMUNIZE NO ADMIN: HCPCS | Performed by: SURGERY

## 2023-12-12 PROCEDURE — 99213 OFFICE O/P EST LOW 20 MIN: CPT | Performed by: SURGERY

## 2023-12-12 PROCEDURE — G8417 CALC BMI ABV UP PARAM F/U: HCPCS | Performed by: SURGERY

## 2023-12-12 PROCEDURE — G8427 DOCREV CUR MEDS BY ELIG CLIN: HCPCS | Performed by: SURGERY

## 2023-12-12 NOTE — PROGRESS NOTES
Celi Ling MD    Elements of this note have been dictated using speech recognition software. As a result, errors of speech recognition may have occurred.     20 minutes of time was spent on this encounter including chart review, assessment, evaluation and coordination of patient care

## 2023-12-14 RX ORDER — TAMSULOSIN HYDROCHLORIDE 0.4 MG/1
0.4 CAPSULE ORAL DAILY
Qty: 30 CAPSULE | Refills: 0 | Status: SHIPPED | OUTPATIENT
Start: 2023-12-14

## 2023-12-26 ENCOUNTER — OFFICE VISIT (OUTPATIENT)
Dept: UROLOGY | Age: 73
End: 2023-12-26
Payer: COMMERCIAL

## 2023-12-26 DIAGNOSIS — R39.14 FEELING OF INCOMPLETE BLADDER EMPTYING: ICD-10-CM

## 2023-12-26 DIAGNOSIS — R33.8 ACUTE URINARY RETENTION: Primary | ICD-10-CM

## 2023-12-26 DIAGNOSIS — N39.0 URINARY TRACT INFECTION WITHOUT HEMATURIA, SITE UNSPECIFIED: ICD-10-CM

## 2023-12-26 DIAGNOSIS — R33.8 ACUTE URINARY RETENTION: ICD-10-CM

## 2023-12-26 LAB
BILIRUBIN, URINE, POC: NEGATIVE
BLOOD URINE, POC: NORMAL
GLUCOSE URINE, POC: NEGATIVE
KETONES, URINE, POC: NEGATIVE
LEUKOCYTE ESTERASE, URINE, POC: NORMAL
NITRITE, URINE, POC: POSITIVE
PH, URINE, POC: 5.5 (ref 4.6–8)
PROTEIN,URINE, POC: 100
PVR, POC: 98 CC
SPECIFIC GRAVITY, URINE, POC: 1.01 (ref 1–1.03)
URINALYSIS CLARITY, POC: NORMAL
URINALYSIS COLOR, POC: NORMAL
UROBILINOGEN, POC: NORMAL

## 2023-12-26 PROCEDURE — 81003 URINALYSIS AUTO W/O SCOPE: CPT | Performed by: NURSE PRACTITIONER

## 2023-12-26 PROCEDURE — 1123F ACP DISCUSS/DSCN MKR DOCD: CPT | Performed by: NURSE PRACTITIONER

## 2023-12-26 PROCEDURE — G8428 CUR MEDS NOT DOCUMENT: HCPCS | Performed by: NURSE PRACTITIONER

## 2023-12-26 PROCEDURE — 99214 OFFICE O/P EST MOD 30 MIN: CPT | Performed by: NURSE PRACTITIONER

## 2023-12-26 PROCEDURE — 1036F TOBACCO NON-USER: CPT | Performed by: NURSE PRACTITIONER

## 2023-12-26 PROCEDURE — G8484 FLU IMMUNIZE NO ADMIN: HCPCS | Performed by: NURSE PRACTITIONER

## 2023-12-26 PROCEDURE — G8417 CALC BMI ABV UP PARAM F/U: HCPCS | Performed by: NURSE PRACTITIONER

## 2023-12-26 PROCEDURE — 3017F COLORECTAL CA SCREEN DOC REV: CPT | Performed by: NURSE PRACTITIONER

## 2023-12-26 PROCEDURE — 51798 US URINE CAPACITY MEASURE: CPT | Performed by: NURSE PRACTITIONER

## 2023-12-26 RX ORDER — SULFAMETHOXAZOLE AND TRIMETHOPRIM 800; 160 MG/1; MG/1
1 TABLET ORAL 2 TIMES DAILY
Qty: 14 TABLET | Refills: 0 | Status: ON HOLD | OUTPATIENT
Start: 2023-12-26 | End: 2023-12-29 | Stop reason: HOSPADM

## 2023-12-26 NOTE — PROGRESS NOTES
[address] [facility]  [address]   [facility] [address] [facility]  [phone]          Katie Mario  : 1950    Chief Complaint   Patient presents with    Follow-up    Urinary Retention          HPI     Katie Mario is a 68 y.o. male    Returns today for follow-up on urinary retention. Patient was seen in the ER 11/15/2023 with the inability to void. Patient had been diagnosed with a viral illness prior to the onset of his urinary issues. He noticed a decrease of urinating 2 days prior to presenting to the ER. PVR in the ER was 900 mL. Leung catheter was placed. He was started on Flomax. No prior history of acute urinary retention. No history of BPH or LUTS. No personal or family history of urological cancers. He is a non-smoker. He was in to see us for catheter removal earlier this month. He reports today that he is voiding sufficiently. PVR reveals 98 mL only. He continues to use tamsulosin 0.4 mg. Past Medical History:   Diagnosis Date    Basal cell carcinoma 2014    Erectile dysfunction     Hypertension     Posterior tibial tendon dysfunction (PTTD) of left lower extremity 2022    Pressure injury of left foot, stage 3 ([address]) 2022     Past Surgical History:   Procedure Laterality Date    HEENT      tonsillectomy    MALIGNANT SKIN LESION EXCISION  2014    left ear lesion; Basal cell carcinoma    TOE AMPUTATION Right 2023    RIGHT SECOND TOE AMPUTATION performed by Nael Ivy MD at [facility] MAIN OR     Current Outpatient Medications   Medication Sig Dispense Refill    sulfamethoxazole-trimethoprim (BACTRIM DS) 800-160 MG per tablet Take 1 tablet by mouth 2 times daily for 7 days 14 tablet 0    tamsulosin (FLOMAX) 0.4 MG capsule Take 1 capsule by mouth daily 30 capsule 0    amLODIPine-benazepril (LOTREL) 5-20 MG per capsule Take 1 capsule by mouth daily 90 capsule 3     No current facility-administered medications for this visit.      No Known

## 2023-12-28 ENCOUNTER — APPOINTMENT (OUTPATIENT)
Dept: GENERAL RADIOLOGY | Age: 73
DRG: 618 | End: 2023-12-28
Payer: COMMERCIAL

## 2023-12-28 ENCOUNTER — HOSPITAL ENCOUNTER (INPATIENT)
Age: 73
LOS: 1 days | Discharge: HOME | DRG: 618 | End: 2023-12-29
Attending: EMERGENCY MEDICINE | Admitting: INTERNAL MEDICINE
Payer: COMMERCIAL

## 2023-12-28 DIAGNOSIS — S98.119A AMPUTATED GREAT TOE, UNSPECIFIED LATERALITY (HCC): ICD-10-CM

## 2023-12-28 DIAGNOSIS — E11.628 DIABETIC FOOT INFECTION (HCC): Primary | ICD-10-CM

## 2023-12-28 DIAGNOSIS — L08.9 DIABETIC FOOT INFECTION (HCC): Primary | ICD-10-CM

## 2023-12-28 PROBLEM — L03.031 CELLULITIS OF TOE OF RIGHT FOOT: Status: ACTIVE | Noted: 2023-12-28

## 2023-12-28 LAB
ALBUMIN SERPL-MCNC: 3 G/DL (ref 3.2–4.6)
ALBUMIN/GLOB SERPL: 0.5 (ref 0.4–1.6)
ALP SERPL-CCNC: 81 U/L (ref 50–136)
ALT SERPL-CCNC: 11 U/L (ref 12–65)
ANION GAP SERPL CALC-SCNC: 6 MMOL/L (ref 2–11)
APPEARANCE UR: CLEAR
AST SERPL-CCNC: 9 U/L (ref 15–37)
BACTERIA URNS QL MICRO: NEGATIVE /HPF
BASOPHILS # BLD: 0.1 K/UL (ref 0–0.2)
BASOPHILS NFR BLD: 1 % (ref 0–2)
BILIRUB SERPL-MCNC: 0.3 MG/DL (ref 0.2–1.1)
BILIRUB UR QL: NEGATIVE
BUN SERPL-MCNC: 22 MG/DL (ref 8–23)
CALCIUM SERPL-MCNC: 9.1 MG/DL (ref 8.3–10.4)
CASTS URNS QL MICRO: ABNORMAL /LPF
CHLORIDE SERPL-SCNC: 104 MMOL/L (ref 103–113)
CO2 SERPL-SCNC: 25 MMOL/L (ref 21–32)
COLOR UR: ABNORMAL
CREAT SERPL-MCNC: 1.5 MG/DL (ref 0.8–1.5)
CRP SERPL-MCNC: 12.1 MG/DL (ref 0–0.9)
DIFFERENTIAL METHOD BLD: ABNORMAL
EKG ATRIAL RATE: 72 BPM
EKG DIAGNOSIS: NORMAL
EKG P AXIS: 36 DEGREES
EKG P-R INTERVAL: 144 MS
EKG Q-T INTERVAL: 408 MS
EKG QRS DURATION: 92 MS
EKG QTC CALCULATION (BAZETT): 446 MS
EKG R AXIS: 77 DEGREES
EKG T AXIS: 50 DEGREES
EKG VENTRICULAR RATE: 72 BPM
EOSINOPHIL # BLD: 0.1 K/UL (ref 0–0.8)
EOSINOPHIL NFR BLD: 1 % (ref 0.5–7.8)
EPI CELLS #/AREA URNS HPF: ABNORMAL /HPF
ERYTHROCYTE [DISTWIDTH] IN BLOOD BY AUTOMATED COUNT: 15.2 % (ref 11.9–14.6)
GLOBULIN SER CALC-MCNC: 5.5 G/DL (ref 2.8–4.5)
GLUCOSE SERPL-MCNC: 146 MG/DL (ref 65–100)
GLUCOSE UR STRIP.AUTO-MCNC: NEGATIVE MG/DL
HCT VFR BLD AUTO: 31.2 % (ref 41.1–50.3)
HGB BLD-MCNC: 9.9 G/DL (ref 13.6–17.2)
HGB UR QL STRIP: NEGATIVE
IMM GRANULOCYTES # BLD AUTO: 0 K/UL (ref 0–0.5)
IMM GRANULOCYTES NFR BLD AUTO: 0 % (ref 0–5)
KETONES UR QL STRIP.AUTO: NEGATIVE MG/DL
LACTATE SERPL-SCNC: 0.8 MMOL/L (ref 0.4–2)
LEUKOCYTE ESTERASE UR QL STRIP.AUTO: ABNORMAL
LYMPHOCYTES # BLD: 2 K/UL (ref 0.5–4.6)
LYMPHOCYTES NFR BLD: 20 % (ref 13–44)
MCH RBC QN AUTO: 26.5 PG (ref 26.1–32.9)
MCHC RBC AUTO-ENTMCNC: 31.7 G/DL (ref 31.4–35)
MCV RBC AUTO: 83.4 FL (ref 82–102)
MONOCYTES # BLD: 0.7 K/UL (ref 0.1–1.3)
MONOCYTES NFR BLD: 7 % (ref 4–12)
NEUTS SEG # BLD: 7.3 K/UL (ref 1.7–8.2)
NEUTS SEG NFR BLD: 71 % (ref 43–78)
NITRITE UR QL STRIP.AUTO: NEGATIVE
NRBC # BLD: 0 K/UL (ref 0–0.2)
PH UR STRIP: 5.5 (ref 5–9)
PLATELET # BLD AUTO: 411 K/UL (ref 150–450)
PMV BLD AUTO: 9.6 FL (ref 9.4–12.3)
POTASSIUM SERPL-SCNC: 4.5 MMOL/L (ref 3.5–5.1)
PROCALCITONIN SERPL-MCNC: <0.05 NG/ML (ref 0–0.49)
PROT SERPL-MCNC: 8.5 G/DL (ref 6.3–8.2)
PROT UR STRIP-MCNC: NEGATIVE MG/DL
RBC # BLD AUTO: 3.74 M/UL (ref 4.23–5.6)
RBC #/AREA URNS HPF: ABNORMAL /HPF
SODIUM SERPL-SCNC: 135 MMOL/L (ref 136–146)
SP GR UR REFRACTOMETRY: 1.01 (ref 1–1.02)
UROBILINOGEN UR QL STRIP.AUTO: 0.2 EU/DL (ref 0.2–1)
WBC # BLD AUTO: 10.2 K/UL (ref 4.3–11.1)
WBC URNS QL MICRO: ABNORMAL /HPF

## 2023-12-28 PROCEDURE — 6370000000 HC RX 637 (ALT 250 FOR IP): Performed by: INTERNAL MEDICINE

## 2023-12-28 PROCEDURE — 1100000000 HC RM PRIVATE

## 2023-12-28 PROCEDURE — 2580000003 HC RX 258: Performed by: INTERNAL MEDICINE

## 2023-12-28 PROCEDURE — 93010 ELECTROCARDIOGRAM REPORT: CPT | Performed by: INTERNAL MEDICINE

## 2023-12-28 PROCEDURE — 96374 THER/PROPH/DIAG INJ IV PUSH: CPT

## 2023-12-28 PROCEDURE — 93005 ELECTROCARDIOGRAM TRACING: CPT | Performed by: PHYSICIAN ASSISTANT

## 2023-12-28 PROCEDURE — 83605 ASSAY OF LACTIC ACID: CPT

## 2023-12-28 PROCEDURE — 2580000003 HC RX 258: Performed by: PHYSICIAN ASSISTANT

## 2023-12-28 PROCEDURE — 84145 PROCALCITONIN (PCT): CPT

## 2023-12-28 PROCEDURE — 87040 BLOOD CULTURE FOR BACTERIA: CPT

## 2023-12-28 PROCEDURE — 6360000002 HC RX W HCPCS: Performed by: INTERNAL MEDICINE

## 2023-12-28 PROCEDURE — 80053 COMPREHEN METABOLIC PANEL: CPT

## 2023-12-28 PROCEDURE — 85025 COMPLETE CBC W/AUTO DIFF WBC: CPT

## 2023-12-28 PROCEDURE — 71045 X-RAY EXAM CHEST 1 VIEW: CPT

## 2023-12-28 PROCEDURE — 99285 EMERGENCY DEPT VISIT HI MDM: CPT

## 2023-12-28 PROCEDURE — 6360000002 HC RX W HCPCS: Performed by: PHYSICIAN ASSISTANT

## 2023-12-28 PROCEDURE — 73660 X-RAY EXAM OF TOE(S): CPT

## 2023-12-28 PROCEDURE — 81001 URINALYSIS AUTO W/SCOPE: CPT

## 2023-12-28 PROCEDURE — 86140 C-REACTIVE PROTEIN: CPT

## 2023-12-28 RX ORDER — POTASSIUM CHLORIDE 20 MEQ/1
40 TABLET, EXTENDED RELEASE ORAL PRN
Status: DISCONTINUED | OUTPATIENT
Start: 2023-12-28 | End: 2023-12-29 | Stop reason: HOSPADM

## 2023-12-28 RX ORDER — INSULIN LISPRO 100 [IU]/ML
0-4 INJECTION, SOLUTION INTRAVENOUS; SUBCUTANEOUS
Status: DISCONTINUED | OUTPATIENT
Start: 2023-12-29 | End: 2023-12-29 | Stop reason: HOSPADM

## 2023-12-28 RX ORDER — POTASSIUM CHLORIDE 7.45 MG/ML
10 INJECTION INTRAVENOUS PRN
Status: DISCONTINUED | OUTPATIENT
Start: 2023-12-28 | End: 2023-12-29 | Stop reason: HOSPADM

## 2023-12-28 RX ORDER — POLYETHYLENE GLYCOL 3350 17 G/17G
17 POWDER, FOR SOLUTION ORAL DAILY PRN
Status: DISCONTINUED | OUTPATIENT
Start: 2023-12-28 | End: 2023-12-29 | Stop reason: HOSPADM

## 2023-12-28 RX ORDER — INSULIN LISPRO 100 [IU]/ML
0-4 INJECTION, SOLUTION INTRAVENOUS; SUBCUTANEOUS NIGHTLY
Status: DISCONTINUED | OUTPATIENT
Start: 2023-12-29 | End: 2023-12-29 | Stop reason: HOSPADM

## 2023-12-28 RX ORDER — LISINOPRIL 20 MG/1
20 TABLET ORAL DAILY
Status: DISCONTINUED | OUTPATIENT
Start: 2023-12-29 | End: 2023-12-29 | Stop reason: HOSPADM

## 2023-12-28 RX ORDER — ONDANSETRON 4 MG/1
4 TABLET, ORALLY DISINTEGRATING ORAL EVERY 8 HOURS PRN
Status: DISCONTINUED | OUTPATIENT
Start: 2023-12-28 | End: 2023-12-29 | Stop reason: HOSPADM

## 2023-12-28 RX ORDER — MAGNESIUM SULFATE IN WATER 40 MG/ML
2000 INJECTION, SOLUTION INTRAVENOUS PRN
Status: DISCONTINUED | OUTPATIENT
Start: 2023-12-28 | End: 2023-12-29 | Stop reason: HOSPADM

## 2023-12-28 RX ORDER — SODIUM CHLORIDE 0.9 % (FLUSH) 0.9 %
5-40 SYRINGE (ML) INJECTION EVERY 12 HOURS SCHEDULED
Status: DISCONTINUED | OUTPATIENT
Start: 2023-12-28 | End: 2023-12-29 | Stop reason: HOSPADM

## 2023-12-28 RX ORDER — SODIUM CHLORIDE 9 MG/ML
INJECTION, SOLUTION INTRAVENOUS PRN
Status: DISCONTINUED | OUTPATIENT
Start: 2023-12-28 | End: 2023-12-29 | Stop reason: HOSPADM

## 2023-12-28 RX ORDER — AMLODIPINE BESYLATE AND BENAZEPRIL HYDROCHLORIDE 5; 20 MG/1; MG/1
1 CAPSULE ORAL DAILY
Status: DISCONTINUED | OUTPATIENT
Start: 2023-12-28 | End: 2023-12-28 | Stop reason: SDUPTHER

## 2023-12-28 RX ORDER — OXYCODONE HYDROCHLORIDE 5 MG/1
5 TABLET ORAL EVERY 6 HOURS PRN
Status: DISCONTINUED | OUTPATIENT
Start: 2023-12-28 | End: 2023-12-29 | Stop reason: HOSPADM

## 2023-12-28 RX ORDER — AMLODIPINE BESYLATE 5 MG/1
5 TABLET ORAL DAILY
Status: DISCONTINUED | OUTPATIENT
Start: 2023-12-29 | End: 2023-12-29 | Stop reason: HOSPADM

## 2023-12-28 RX ORDER — SODIUM CHLORIDE 0.9 % (FLUSH) 0.9 %
5-40 SYRINGE (ML) INJECTION PRN
Status: DISCONTINUED | OUTPATIENT
Start: 2023-12-28 | End: 2023-12-29 | Stop reason: HOSPADM

## 2023-12-28 RX ORDER — MORPHINE SULFATE 2 MG/ML
2 INJECTION, SOLUTION INTRAMUSCULAR; INTRAVENOUS EVERY 4 HOURS PRN
Status: DISCONTINUED | OUTPATIENT
Start: 2023-12-28 | End: 2023-12-29 | Stop reason: HOSPADM

## 2023-12-28 RX ORDER — ACETAMINOPHEN 325 MG/1
650 TABLET ORAL EVERY 6 HOURS PRN
Status: DISCONTINUED | OUTPATIENT
Start: 2023-12-28 | End: 2023-12-29 | Stop reason: HOSPADM

## 2023-12-28 RX ORDER — SODIUM CHLORIDE 9 MG/ML
INJECTION, SOLUTION INTRAVENOUS CONTINUOUS
Status: DISCONTINUED | OUTPATIENT
Start: 2023-12-28 | End: 2023-12-29 | Stop reason: HOSPADM

## 2023-12-28 RX ORDER — TAMSULOSIN HYDROCHLORIDE 0.4 MG/1
0.4 CAPSULE ORAL DAILY
Status: DISCONTINUED | OUTPATIENT
Start: 2023-12-28 | End: 2023-12-29 | Stop reason: HOSPADM

## 2023-12-28 RX ORDER — ONDANSETRON 2 MG/ML
4 INJECTION INTRAMUSCULAR; INTRAVENOUS EVERY 6 HOURS PRN
Status: DISCONTINUED | OUTPATIENT
Start: 2023-12-28 | End: 2023-12-29 | Stop reason: HOSPADM

## 2023-12-28 RX ADMIN — TAMSULOSIN HYDROCHLORIDE 0.4 MG: 0.4 CAPSULE ORAL at 22:17

## 2023-12-28 RX ADMIN — SODIUM CHLORIDE: 9 INJECTION, SOLUTION INTRAVENOUS at 22:18

## 2023-12-28 RX ADMIN — WATER 1000 MG: 1 INJECTION INTRAMUSCULAR; INTRAVENOUS; SUBCUTANEOUS at 13:06

## 2023-12-28 RX ADMIN — SODIUM CHLORIDE, PRESERVATIVE FREE 10 ML: 5 INJECTION INTRAVENOUS at 22:19

## 2023-12-28 RX ADMIN — VANCOMYCIN HYDROCHLORIDE 2000 MG: 10 INJECTION, POWDER, LYOPHILIZED, FOR SOLUTION INTRAVENOUS at 20:45

## 2023-12-28 RX ADMIN — SODIUM CHLORIDE 3000 MG: 900 INJECTION INTRAVENOUS at 23:50

## 2023-12-28 ASSESSMENT — PAIN SCALES - GENERAL: PAINLEVEL_OUTOF10: 0

## 2023-12-28 NOTE — ED TRIAGE NOTES
Pt ambulatory unassisted to triage. Pt complains of wound to R great toe. Reports initially noted and seen by Dr. Amador Alexander approximately x3 weeks ago. Since initially noted wound has worsened. Reports drainage from toe. R foot is red and warm to touch. Denies fevers. Hx of toe amputation a few months ago.

## 2023-12-28 NOTE — ED PROVIDER NOTES
Emergency Department Provider Note       PCP: Esther Acosta MD   Age: 68 y.o. Sex: male     DISPOSITION Admitted 12/28/2023 04:25:54 PM       ICD-10-CM    1. Diabetic foot infection [facility])  E11.628     L08.9           Medical Decision Making     Complexity of Problems Addressed:  1 or more acute illnesses that pose a threat to life or bodily function. Data Reviewed and Analyzed:   I independently ordered and reviewed each unique test.  I reviewed external records: provider visit note from outside specialist.     I independently ordered and interpreted the ED EKG in the absence of a Cardiologist.    Rate: 72  EKG Interpretation: EKG Interpretation: sinus rhythm, no evidence of arrhythmia  ST Segments: Normal ST segments - NO STEMI      I interpreted the X-rays bony destruction of first distal, proximal phalanx, first metatarsal. Agree with radiologist interpretation. Discussion of management or test interpretation. 71-year-old male with history of diabetes and osteomyelitis of the right foot resulting in second digit amputation in September presenting for right great toe redness, swelling and wound. No fevers. Vital stable. Labs reassuring with normal procalcitonin, lactic acid level, and white blood cell count. X-ray does show bony erosion of the distal phalanx and proximal phalanx of the great toe as well as the head of the metatarsal consistent with osteomyelitis. Given patient's history I do think admission is indicated, patient agrees with plan. Discussed with my attending, Dr. Darron Arciniega. I spoke with the hospitalist who accepts patient for admission. Of note radiologist noting interstitial thickening on chest x-ray suggestive of viral pneumonia or acute bronchitis however patient currently not experiencing any sort of fever, cough, shortness of breath. Unclear etiology of these findings.     The patient was admitted and I have discussed patient management with the admitting evidence of bony erosions at the base of the first proximal   phalanx and at the distal head of the first metatarsal bone.                         Voice dictation software was used during the making of this note.  This software is not perfect and grammatical and other typographical errors may be present.  This note has not been completely proofread for errors.       Carrie Martinez PA  12/28/23 4151

## 2023-12-28 NOTE — H&P
Hospitalist History and Physical   Admit Date:  2023 11:09 AM   Name:  Tawanda Wiley   Age:  68 y.o. Sex:  male  :  1950   MRN:  [id number]   Room:  ER    Presenting/Chief Complaint: Toe Pain     Reason(s) for Admission: Cellulitis of toe of right foot [L03.031]     History of Present Illness:   Tawanda Wiley is a 68 y.o. male with DM2, chronic anemia, HTN, previous right second toe amputation due to MRSA infection, and known right big toe infection presented to the ER with worsening right big toe swelling and purulent drainage over a 2-week period. He states that he saw vascular surgery on  for right big toe swelling. Over the next couple of weeks the swelling became worse despite using Betadine and wound care as prescribed. He then started having pus come out of the big toe. He saw his family doctor on  and was started on Bactrim. Despite 2 days of Bactrim, the toe worsened so he came to the ER to be evaluated. X-ray revealed soft tissue swelling and bony erosion. Denies fevers. Denies chest pain. Denies shortness of breath. Denies toe pain.       Assessment & Plan:     Principal Problem:    Cellulitis of toe of right foot    Diabetic foot infection ([address])  Right big toe with worsening edema, erythema, and now with purulence => associated with diabetes  X-ray with first big toe bony erosion and soft tissue swelling  Second right toe with previous MRSA infection  Start vancomycin to cover gram-positive bacteria including MRSA  Start Unasyn to cover gram-negative, gram-positive, and anaerobic bacteria  Consult vascular surgery since known to them    Active Problems:    Type 2 diabetes mellitus with hyperglycemia, without long-term current use of insulin ([facility])  A1c in 2023 7.2  Hold home meds  Start Humalog SSI      Primary hypertension  BP currently stable  Continue amlodipine 5 mg daily  Continue lisinopril 20 mg daily      Normocytic anemia  Currently

## 2023-12-29 ENCOUNTER — ANESTHESIA EVENT (OUTPATIENT)
Dept: SURGERY | Age: 73
End: 2023-12-29
Payer: COMMERCIAL

## 2023-12-29 ENCOUNTER — ANESTHESIA (OUTPATIENT)
Dept: SURGERY | Age: 73
End: 2023-12-29
Payer: COMMERCIAL

## 2023-12-29 VITALS
DIASTOLIC BLOOD PRESSURE: 61 MMHG | OXYGEN SATURATION: 98 % | SYSTOLIC BLOOD PRESSURE: 103 MMHG | BODY MASS INDEX: 26.52 KG/M2 | HEIGHT: 68 IN | WEIGHT: 175 LBS | RESPIRATION RATE: 16 BRPM | TEMPERATURE: 97.8 F | HEART RATE: 78 BPM

## 2023-12-29 PROBLEM — S98.119A AMPUTATION OF GREAT TOE (HCC): Status: ACTIVE | Noted: 2023-12-29

## 2023-12-29 LAB
ANION GAP SERPL CALC-SCNC: 6 MMOL/L (ref 2–11)
BACTERIA SPEC CULT: ABNORMAL
BUN SERPL-MCNC: 17 MG/DL (ref 8–23)
CALCIUM SERPL-MCNC: 8.7 MG/DL (ref 8.3–10.4)
CHLORIDE SERPL-SCNC: 109 MMOL/L (ref 103–113)
CO2 SERPL-SCNC: 23 MMOL/L (ref 21–32)
CREAT SERPL-MCNC: 1.1 MG/DL (ref 0.8–1.5)
ERYTHROCYTE [DISTWIDTH] IN BLOOD BY AUTOMATED COUNT: 15.1 % (ref 11.9–14.6)
GLUCOSE BLD STRIP.AUTO-MCNC: 127 MG/DL (ref 65–100)
GLUCOSE SERPL-MCNC: 107 MG/DL (ref 65–100)
HCT VFR BLD AUTO: 26.8 % (ref 41.1–50.3)
HGB BLD-MCNC: 8.4 G/DL (ref 13.6–17.2)
INR PPP: 1.1
LACTATE SERPL-SCNC: 0.7 MMOL/L (ref 0.4–2)
MCH RBC QN AUTO: 26.3 PG (ref 26.1–32.9)
MCHC RBC AUTO-ENTMCNC: 31.3 G/DL (ref 31.4–35)
MCV RBC AUTO: 84 FL (ref 82–102)
NRBC # BLD: 0 K/UL (ref 0–0.2)
PHOSPHATE SERPL-MCNC: 3.8 MG/DL (ref 2.3–3.7)
PLATELET # BLD AUTO: 371 K/UL (ref 150–450)
PMV BLD AUTO: 9.6 FL (ref 9.4–12.3)
POTASSIUM SERPL-SCNC: 4.4 MMOL/L (ref 3.5–5.1)
PROTHROMBIN TIME: 15 SEC (ref 11.3–14.9)
RBC # BLD AUTO: 3.19 M/UL (ref 4.23–5.6)
SERVICE CMNT-IMP: ABNORMAL
SERVICE CMNT-IMP: ABNORMAL
SODIUM SERPL-SCNC: 138 MMOL/L (ref 136–146)
WBC # BLD AUTO: 8.5 K/UL (ref 4.3–11.1)

## 2023-12-29 PROCEDURE — 6360000002 HC RX W HCPCS: Performed by: INTERNAL MEDICINE

## 2023-12-29 PROCEDURE — 7100000000 HC PACU RECOVERY - FIRST 15 MIN: Performed by: SURGERY

## 2023-12-29 PROCEDURE — 85027 COMPLETE CBC AUTOMATED: CPT

## 2023-12-29 PROCEDURE — A4217 STERILE WATER/SALINE, 500 ML: HCPCS | Performed by: SURGERY

## 2023-12-29 PROCEDURE — 6360000002 HC RX W HCPCS: Performed by: SURGERY

## 2023-12-29 PROCEDURE — 82962 GLUCOSE BLOOD TEST: CPT

## 2023-12-29 PROCEDURE — 28810 AMPUTATION TOE & METATARSAL: CPT | Performed by: SURGERY

## 2023-12-29 PROCEDURE — 99222 1ST HOSP IP/OBS MODERATE 55: CPT | Performed by: SURGERY

## 2023-12-29 PROCEDURE — 2580000003 HC RX 258: Performed by: INTERNAL MEDICINE

## 2023-12-29 PROCEDURE — 84100 ASSAY OF PHOSPHORUS: CPT

## 2023-12-29 PROCEDURE — 3600000002 HC SURGERY LEVEL 2 BASE: Performed by: SURGERY

## 2023-12-29 PROCEDURE — 83605 ASSAY OF LACTIC ACID: CPT

## 2023-12-29 PROCEDURE — 2580000003 HC RX 258: Performed by: SURGERY

## 2023-12-29 PROCEDURE — 2580000003 HC RX 258: Performed by: NURSE ANESTHETIST, CERTIFIED REGISTERED

## 2023-12-29 PROCEDURE — 2709999900 HC NON-CHARGEABLE SUPPLY: Performed by: SURGERY

## 2023-12-29 PROCEDURE — 6360000002 HC RX W HCPCS: Performed by: NURSE ANESTHETIST, CERTIFIED REGISTERED

## 2023-12-29 PROCEDURE — 88305 TISSUE EXAM BY PATHOLOGIST: CPT

## 2023-12-29 PROCEDURE — 85610 PROTHROMBIN TIME: CPT

## 2023-12-29 PROCEDURE — 36415 COLL VENOUS BLD VENIPUNCTURE: CPT

## 2023-12-29 PROCEDURE — 3600000012 HC SURGERY LEVEL 2 ADDTL 15MIN: Performed by: SURGERY

## 2023-12-29 PROCEDURE — 6360000002 HC RX W HCPCS: Performed by: ANESTHESIOLOGY

## 2023-12-29 PROCEDURE — 88311 DECALCIFY TISSUE: CPT

## 2023-12-29 PROCEDURE — 80048 BASIC METABOLIC PNL TOTAL CA: CPT

## 2023-12-29 PROCEDURE — 7100000001 HC PACU RECOVERY - ADDTL 15 MIN: Performed by: SURGERY

## 2023-12-29 PROCEDURE — 64447 NJX AA&/STRD FEMORAL NRV IMG: CPT | Performed by: ANESTHESIOLOGY

## 2023-12-29 PROCEDURE — 2500000003 HC RX 250 WO HCPCS: Performed by: NURSE ANESTHETIST, CERTIFIED REGISTERED

## 2023-12-29 PROCEDURE — 87040 BLOOD CULTURE FOR BACTERIA: CPT

## 2023-12-29 PROCEDURE — 0Y6P0Z0 DETACHMENT AT RIGHT 1ST TOE, COMPLETE, OPEN APPROACH: ICD-10-PCS | Performed by: SURGERY

## 2023-12-29 PROCEDURE — 3700000000 HC ANESTHESIA ATTENDED CARE: Performed by: SURGERY

## 2023-12-29 PROCEDURE — 3700000001 HC ADD 15 MINUTES (ANESTHESIA): Performed by: SURGERY

## 2023-12-29 PROCEDURE — 64445 NJX AA&/STRD SCIATIC NRV IMG: CPT | Performed by: ANESTHESIOLOGY

## 2023-12-29 RX ORDER — AMOXICILLIN AND CLAVULANATE POTASSIUM 875; 125 MG/1; MG/1
1 TABLET, FILM COATED ORAL 2 TIMES DAILY
Qty: 28 TABLET | Refills: 0 | Status: SHIPPED | OUTPATIENT
Start: 2023-12-29 | End: 2024-01-12

## 2023-12-29 RX ORDER — MIDAZOLAM HYDROCHLORIDE 2 MG/2ML
2 INJECTION, SOLUTION INTRAMUSCULAR; INTRAVENOUS ONCE
Status: COMPLETED | OUTPATIENT
Start: 2023-12-29 | End: 2023-12-29

## 2023-12-29 RX ORDER — LIDOCAINE HYDROCHLORIDE 20 MG/ML
INJECTION, SOLUTION EPIDURAL; INFILTRATION; INTRACAUDAL; PERINEURAL PRN
Status: DISCONTINUED | OUTPATIENT
Start: 2023-12-29 | End: 2023-12-29 | Stop reason: SDUPTHER

## 2023-12-29 RX ORDER — FENTANYL CITRATE 50 UG/ML
100 INJECTION, SOLUTION INTRAMUSCULAR; INTRAVENOUS ONCE
Status: COMPLETED | OUTPATIENT
Start: 2023-12-29 | End: 2023-12-29

## 2023-12-29 RX ORDER — PROPOFOL 10 MG/ML
INJECTION, EMULSION INTRAVENOUS PRN
Status: DISCONTINUED | OUTPATIENT
Start: 2023-12-29 | End: 2023-12-29 | Stop reason: SDUPTHER

## 2023-12-29 RX ORDER — SODIUM CHLORIDE, SODIUM LACTATE, POTASSIUM CHLORIDE, CALCIUM CHLORIDE 600; 310; 30; 20 MG/100ML; MG/100ML; MG/100ML; MG/100ML
INJECTION, SOLUTION INTRAVENOUS CONTINUOUS PRN
Status: DISCONTINUED | OUTPATIENT
Start: 2023-12-29 | End: 2023-12-29 | Stop reason: SDUPTHER

## 2023-12-29 RX ORDER — OXYCODONE HYDROCHLORIDE AND ACETAMINOPHEN 5; 325 MG/1; MG/1
1 TABLET ORAL EVERY 4 HOURS PRN
Qty: 30 TABLET | Refills: 0 | Status: SHIPPED | OUTPATIENT
Start: 2023-12-29 | End: 2024-01-03

## 2023-12-29 RX ADMIN — FENTANYL CITRATE 50 MCG: 50 INJECTION, SOLUTION INTRAMUSCULAR; INTRAVENOUS at 09:23

## 2023-12-29 RX ADMIN — MIDAZOLAM 1 MG: 1 INJECTION INTRAMUSCULAR; INTRAVENOUS at 09:23

## 2023-12-29 RX ADMIN — PHENYLEPHRINE HYDROCHLORIDE 100 MCG: 10 INJECTION INTRAVENOUS at 10:13

## 2023-12-29 RX ADMIN — SODIUM CHLORIDE, SODIUM LACTATE, POTASSIUM CHLORIDE, AND CALCIUM CHLORIDE: 600; 310; 30; 20 INJECTION, SOLUTION INTRAVENOUS at 09:39

## 2023-12-29 RX ADMIN — ROPIVACAINE HYDROCHLORIDE 35 ML: 5 INJECTION, SOLUTION EPIDURAL; INFILTRATION; PERINEURAL at 09:23

## 2023-12-29 RX ADMIN — LIDOCAINE HYDROCHLORIDE 50 MG: 20 INJECTION, SOLUTION EPIDURAL; INFILTRATION; INTRACAUDAL; PERINEURAL at 09:49

## 2023-12-29 RX ADMIN — SODIUM CHLORIDE 3000 MG: 900 INJECTION INTRAVENOUS at 02:50

## 2023-12-29 RX ADMIN — Medication 2000 MG: at 09:50

## 2023-12-29 RX ADMIN — PHENYLEPHRINE HYDROCHLORIDE 100 MCG: 10 INJECTION INTRAVENOUS at 10:27

## 2023-12-29 RX ADMIN — PROPOFOL 75 MCG/KG/MIN: 10 INJECTION, EMULSION INTRAVENOUS at 09:50

## 2023-12-29 RX ADMIN — ROPIVACAINE HYDROCHLORIDE 18 ML: 5 INJECTION, SOLUTION EPIDURAL; INFILTRATION; PERINEURAL at 09:28

## 2023-12-29 RX ADMIN — PROPOFOL 30 MG: 10 INJECTION, EMULSION INTRAVENOUS at 09:49

## 2023-12-29 ASSESSMENT — PAIN - FUNCTIONAL ASSESSMENT
PAIN_FUNCTIONAL_ASSESSMENT: 0-10
PAIN_FUNCTIONAL_ASSESSMENT: NONE - DENIES PAIN

## 2023-12-29 NOTE — PERIOP NOTE
TRANSFER - IN REPORT:    Verbal report received from ROYER oCol on Michaelle Schneider  being received from [place] [id number] for routine progression of patient care      Report consisted of patient's Situation, Background, Assessment and   Recommendations(SBAR). Information from the following report(s) Nurse Handoff Report was reviewed with the receiving nurse. Opportunity for questions and clarification was provided. Assessment to be completed upon patient's arrival to unit and care to be  assumed.

## 2023-12-29 NOTE — PROGRESS NOTES
TRANSFER - IN REPORT:    Verbal report received from [address] [place], [place] [address]betina Dee  being received from ED for routine progression of patient care      Report consisted of patient's Situation, Background, Assessment and   Recommendations(SBAR). Information from the following report(s) Nurse Handoff Report was reviewed with the receiving nurse. Opportunity for questions and clarification was provided. Assessment completed upon patient's arrival to unit and care assumed.

## 2023-12-29 NOTE — ANESTHESIA PROCEDURE NOTES
Peripheral Block    Patient location during procedure: pre-op  Reason for block: post-op pain management and at surgeon's request  Start time: 12/29/2023 9:23 AM  End time: 12/29/2023 9:27 AM  Staffing  Performed: anesthesiologist   Anesthesiologist: Sidney Khan MD  Performed by: Sidney Khan MD  Authorized by: Sidney Khan MD    Preanesthetic Checklist  Completed: patient identified, IV checked, site marked, risks and benefits discussed, surgical/procedural consents, equipment checked, pre-op evaluation, timeout performed, anesthesia consent given, oxygen available and monitors applied/VS acknowledged  Peripheral Block   Patient position: supine  Prep: ChloraPrep  Provider prep: sterile gloves and mask  Patient monitoring: cardiac monitor, continuous pulse ox, frequent blood pressure checks, IV access, oxygen and responsive to questions  Block type: Sciatic  Popliteal  Laterality: right  Injection technique: single-shot  Guidance: nerve stimulator and ultrasound guided    Needle   Needle type: insulated echogenic nerve stimulator needle   Needle gauge: 20 G  Needle localization: anatomical landmarks, nerve stimulator and ultrasound guidance  Assessment   Injection assessment: negative aspiration for heme, no paresthesia on injection, local visualized surrounding nerve on ultrasound and no intravascular symptoms  Slow fractionated injection: yes  Hemodynamics: stable  Real-time US image taken/store: yes  Outcomes: patient tolerated procedure well    Additional Notes  LA visualized surrounding the nerves with neural structure intact.  Permanent image stored on chart  0.5% Ropivacaine + epi 1:400,000 30 ml  Medications Administered  ROPivacaine 0.5% with EPINEPHrine 1:305004 injection (ANESTHESIA USE ONLY) (Mixture components: EPINEPHrine PF 1 MG/ML Soln, 0.005 mL; ROPivacaine 0.5% Soln, 1 mL) - Perineural   35 mL - 12/29/2023 9:23:00 AM

## 2023-12-29 NOTE — WOUND CARE
Noted consult for wound care, noted Dr. Stephanie Jeffers with vascular taking to surgery today for same wound, noted Dr. Stephanie Jeffers states may need a wound vac. Will be available after surgery to assist with wound care as needed.

## 2023-12-29 NOTE — CONSULTS
[address] [facility]   302 [facility]. [address] [place] [address]  FAX: [phone]    Cristian Holliday  1950    Chief Complaint   Patient presents with    Toe Pain           HPI   Mr. Cristian Holliday is a 68y.o. year old male who presents with pain in his right first toe. Patient status post right second toe amputation.     Current Facility-Administered Medications   Medication Dose Route Frequency Provider Last Rate Last Admin    tamsulosin (FLOMAX) capsule 0.4 mg  0.4 mg Oral Daily Pau, [place]th[place] E, DO   0.4 mg at 12/28/23 2217    sodium chloride flush 0.9 % injection 5-40 mL  5-40 mL IntraVENous 2 times per day Darnella Sill E, DO   10 mL at 12/28/23 2219    sodium chloride flush 0.9 % injection 5-40 mL  5-40 mL IntraVENous PRN [place], [place]th[place] E, DO        0.9 % sodium chloride infusion   IntraVENous PRN [place], Shirin E, DO        potassium chloride (KLOR-CON M) extended release tablet 40 mEq  40 mEq Oral PRN [place], [place]th[place] E, DO        Or    potassium bicarb-citric acid (EFFER-K) effervescent tablet 40 mEq  40 mEq Oral PRN Pau, [place]th[place] E, DO        Or    potassium chloride 10 mEq/100 mL IVPB (Peripheral Line)  10 mEq IntraVENous PRN [place], [place]th[place] E, DO        magnesium sulfate 2000 mg in 50 mL IVPB premix  2,000 mg IntraVENous PRN [place], [place] E, DO        ondansetron (ZOFRAN-ODT) disintegrating tablet 4 mg  4 mg Oral Q8H PRN [place], Shirin E, DO        Or    ondansetron (ZOFRAN) injection 4 mg  4 mg IntraVENous Q6H PRN [place], [place]th[place] E, DO        polyethylene glycol (GLYCOLAX) packet 17 g  17 g Oral Daily PRN Pau, [place]th[place] E, DO        acetaminophen (TYLENOL) tablet 650 mg  650 mg Oral Q6H PRN Pau, [place]th[place] E, DO        Or    acetaminophen (TYLENOL) suppository 650 mg  650 mg Rectal Q6H PRN [place], [place] E, DO        0.9 % sodium chloride infusion   IntraVENous Continuous Darnella Sill E, DO 75 mL/hr at 12/28/23 2218 New Bag at 12/28/23 2218    ampicillin-sulbactam (UNASYN) 3,000 mg in sodium chloride 0.9 % 100 mL IVPB (mini-bag)  3,000 mg IntraVENous Q6H Shirin Mai, DO   Stopped at 12/29/23 0320    oxyCODONE (ROXICODONE) immediate release tablet 5 mg  5 mg Oral Q6H PRN Shirin Mai, DO        morphine injection 2 mg  2 mg IntraVENous Q4H PRN Shirin Mai, DO        amLODIPine (NORVASC) tablet 5 mg  5 mg Oral Daily Shirin Mia, DO        lisinopril (PRINIVIL;ZESTRIL) tablet 20 mg  20 mg Oral Daily Shirin Mai, DO        vancomycin (VANCOCIN) 1,000 mg in sodium chloride 0.9 % 250 mL IVPB ([id number])  1,000 mg IntraVENous Q24H Shirin aMi,         insulin lispro (HUMALOG) injection vial 0-4 Units  0-4 Units SubCUTAneous TID WC Shirin Mai, DO        insulin lispro (HUMALOG) injection vial 0-4 Units  0-4 Units SubCUTAneous Nightly Shirin Mai, DO        tuberculin injection 5 Units  5 Units IntraDERmal Once Shirin Mai, DO         No Known Allergies  Past Medical History:   Diagnosis Date    Basal cell carcinoma 6/2014    Erectile dysfunction     Hypertension     Posterior tibial tendon dysfunction (PTTD) of left lower extremity 2/9/2022    Pressure injury of left foot, stage 3 (HCC) 2/9/2022     Family History   Problem Relation Age of Onset    Cancer Mother 58        breast cancer    Cancer Father 81        colon cancer     Past Surgical History:   Procedure Laterality Date    HEENT      tonsillectomy    MALIGNANT SKIN LESION EXCISION  6/2014    left ear lesion; Basal cell carcinoma    TOE AMPUTATION Right 9/27/2023    RIGHT SECOND TOE AMPUTATION performed by Juan Hernandez MD at [facility] MAIN OR     Social History     Tobacco Use    Smoking status: Never    Smokeless tobacco: Never   Substance Use Topics    Alcohol use: No        Review of Systems  Constitutional: Negative for fever and chills.   HENT: Negative for congestion and sore throat.    Skin: Negative for rash and

## 2023-12-29 NOTE — OP NOTE
400 [facility]  OPERATIVE REPORT    Name:  Eloisa Fierro  MR#:  [id number]  :  1950  ACCOUNT #:  [de-identified]  DATE OF SERVICE:  2023    CLINICAL SERVICE:  Vascular Surgery    PREOPERATIVE DIAGNOSIS:  Right first toe ulcer. POSTOPERATIVE DIAGNOSIS:  Right first toe ulcer. PROCEDURE PERFORMED:  Right first toe amputation. SURGEON:  Dariwn Olivares. Raquel Singleton MD    ASSISTANT:      ANESTHESIA:  General.    COMPLICATIONS:  None. SPECIMENS REMOVED:  None. IMPLANTS:  .    ESTIMATED BLOOD LOSS:  .    PROCEDURE:  After getting informed consent, the patient was brought to the operating room. Anesthesia was induced. Preoperative antibiotics were given before skin incision. The patient's right leg was then prepped and draped in normal sterile fashion. A ximena cut incision was made at the base of the metatarsal and dissected down through the subcutaneous tissue. There was no evidence of any abscess or fluid collection. We resected the bone using a rongeur and removed it down to the metatarsal head. We irrigated the wound with antibiotic solution. We reapproximated the fascia with 3-0 Vicryl and 4-0 nylon for the skin. The patient was extubated and taken to the PACU in stable condition.       Chaparro Smith MD      [id number]  D:  2023 11:02  T:  2023 12:18  JOB #:  [id number]

## 2023-12-29 NOTE — BRIEF OP NOTE
[address]   [address]  FAX: [phone]    Brief Op Note Template Note    Pre-Op Diagnosis: PVD (peripheral vascular disease) ([address]) [I73.9]    Post-Op Diagnosis:  * No post-op diagnosis entered *    Procedures: Procedure(s):  RIGHT FIRST TOE AMPUTATION    Surgeon: Garima Rider MD    Assistants: Surgeon(s): Garima Rider MD      Anesthesia:  General     Findings: Right first toe    Tourniquet Time:  * No tourniquets in log *    Estimated Blood Loss:               Specimens:             Implants:  * No implants in log *    Complications: None               Signed: Garima Rider MD      Elements of this note have been dictated using speech recognition software. As a result, errors of speech recognition may have occurred.

## 2023-12-29 NOTE — ED NOTES
TRANSFER - OUT REPORT:    Verbal report given to Samantha Atwood Apo  being transferred to [facility] for routine progression of patient care       Report consisted of patient's Situation, Background, Assessment and   Recommendations(SBAR). Information from the following report(s) ED SBAR was reviewed with the receiving nurse. Pavan Fall Assessment:    Presents to emergency department  because of falls (Syncope, seizure, or loss of consciousness): No  Age > 70: Yes  Altered Mental Status, Intoxication with alcohol or substance confusion (Disorientation, impaired judgment, poor safety awaremess, or inability to follow instructions): No  Impaired Mobility: Ambulates or transfers with assistive devices or assistance; Unable to ambulate or transer.: No             Lines:   Peripheral IV 12/28/23 Left Antecubital (Active)   Site Assessment Clean, dry & intact 12/28/23 2049   Phlebitis Assessment No symptoms 12/28/23 2049   Infiltration Assessment 0 12/28/23 2049        Opportunity for questions and clarification was provided.       Patient transported with:  Registered Nurse          Dione Mendoza RN  12/28/23 3580

## 2023-12-29 NOTE — ACP (ADVANCE CARE PLANNING)
Advance Care Planning     General Advance Care Planning (ACP) Conversation    Date of Conversation: 12/28/2023  Conducted with: Patient with Decision Making Capacity    Healthcare Decision Maker:    Primary Decision Maker: Briseida Reid - Child - [phone]    Primary Decision Maker: Kishor Crawford - Child - [phone]  Click here to complete Healthcare Decision Makers including selection of the Healthcare Decision Maker Relationship (ie \"Primary\"). Today we documented Decision Maker(s) consistent with Legal Next of Kin hierarchy. Content/Action Overview:  Has NO ACP documents/care preferences - refer to ACP Clinical Specialist  Reviewed DNR/DNI and patient elects Full Code (Attempt Resuscitation)  treatment goals, hospitalization preferences, and resuscitation preferences  No ACP documents on file. Full Code per physician order.     Length of Voluntary ACP Conversation in minutes:  <16 minutes (Non-Billable)    Roxene Baumgarten, MSW

## 2023-12-29 NOTE — DISCHARGE SUMMARY
[address] [place] [address]  FAX: [phone]       Physician Discharge Summary     Patient: David Rosenbaum MRN: [id number]  SSN: [id number]    YOB: 1950  Age: 68 y.o. Sex: male       Admit Date: 12/28/2023    Discharge Date: 12/29/2023      Admitting Physician: Eugene Diaz DO     Discharge Physician: Mau Schuster, APRN - CNP    Admission Diagnoses: Cellulitis of toe of right foot [L03.031]  Diabetic foot infection ([address]) [E11.628, L08.9]    Discharge Diagnoses: Same       Procedures for this admission: Procedure(s):  RIGHT FIRST TOE AMPUTATION    Discharged Condition: stable    Hospital Course: Patient is a 68year old male who presented to the ED yesterday with c/o pain to his right great great. He has undergone amputation of his right 2nd toe. He was given IV antibiotics and admitted to the hospital. He underwent a right great toe amputation today with Dr. Brisa Wellington. He is stable for d/c with oral antibiotics and a 2 week follow-up. Consults: Hospitalist    Significant Diagnostic Studies: labs    Treatments: IV hydration, antibiotics: Ancef, vancomycin, and ceftriaxone, analgesia: acetaminophen, Morphine, and Roxicodone, cardiac meds: amlodipine and lisinopril, insulin: Humalog, and surgery: see above    Discharge Exam: GEN: alert, appears stated age, and cooperative  LUNG: clear to auscultation bilaterally  HEART: regular rate and rhythm, S1, S2 normal, no murmur, click, rub or gallop  EXTREMITIES:    surgical dressing in place    Disposition: home    Wound Care: Remove surgical dressing tomorrow. ok to shower, pat surgical incision dry and cover with a bandaid.      Patient Instructions:   Current Discharge Medication List        START taking these medications    Details   amoxicillin-clavulanate (AUGMENTIN) 875-125 MG per tablet Take 1 tablet by mouth 2 times daily for 14 days  Qty: 28 tablet, Refills: 0

## 2023-12-29 NOTE — DISCHARGE INSTRUCTIONS
Status post right first toe amputation. Patient can remove his postop dressing tomorrow okay to get it wet tomorrow. He can cover the incision with a Band-Aid. Will prescribe him 2 weeks of antibiotics and follow-up in 2 weeks to remove sutures. Patient already has offloading shoe. MEDICATION INTERACTION:  After general anesthesia or intravenous sedation, for 24 hours or while taking prescription Narcotics:  Limit your activities  Some people will feel drowsy or dizzy for up to a few hours after waking up. A responsible adult needs to be with you for the next 24 hours  Do not drive and operate hazardous machinery  Do not make important personal or business decisions  Do not drink alcoholic beverages  If you have not urinated within 8 hours after discharge, and you are experiencing discomfort from urinary retention, please go to the nearest ED. If you have sleep apnea and have a CPAP machine, please use it for all naps and sleeping. Please use caution when taking narcotics and any of your home medications that may cause drowsiness. *  Please give a list of your current medications to your Primary Care Provider. *  Please update this list whenever your medications are discontinued, doses are      changed, or new medications (including over-the-counter products) are added. *  Please carry medication information at all times in case of emergency situations. These are general instructions for a healthy lifestyle:  No smoking/ No tobacco products/ Avoid exposure to second hand smoke  Surgeon General's Warning:  Quitting smoking now greatly reduces serious risk to your health.   Obesity, smoking, and sedentary lifestyle greatly increases your risk for illness  A healthy diet, regular physical exercise & weight monitoring are important for maintaining a healthy lifestyle    You may be retaining fluid if you have a history of heart failure or if you experience any of the following symptoms:  Weight gain of 3

## 2023-12-29 NOTE — CARE COORDINATION
ASSESSMENT NOTE    Attending Physician: Darien Norwood DO  Admit Problem: Cellulitis of toe of right foot [L03.031]  Diabetic foot infection ([address]) [V01.585, L08.9]  Date/Time of Admission: 12/28/2023 11:09 AM  Problem List:  Patient Active Problem List   Diagnosis    Type 2 diabetes mellitus with hyperglycemia, without long-term current use of insulin (HCC)    Erectile dysfunction    Pressure injury of left foot, stage 3 (HCC)    Posterior tibial tendon dysfunction (PTTD) of left lower extremity    Primary hypertension    Basal cell carcinoma    Diabetic foot ulcer with osteomyelitis ([address])    WILL (acute kidney injury) ([address])    Sepsis due to methicillin resistant Staphylococcus aureus (MRSA) ([address])    Hypoproteinemia ([address])    MRSA bacteremia    Osteomyelitis of second toe of right foot (HCC)    Amputation of toe of right foot ([address])    Diabetic foot infection ([address])    Normocytic anemia    Cellulitis of toe of right foot    Amputation of great toe [facility])       Service Assessment  Patient Orientation Alert and Oriented, Person, Place, Self   Cognition Alert   History Provided By Patient, Medical Record   Primary Caregiver Self   Accompanied By/Relationship N/A   Support Systems Children, Family Members   Patient's Healthcare Decision Maker is: Legal Next of [address] [place] [address]   PCP Verified by CM Yes Beka Arce. Lynn Pacheco MD)   Last Visit to PCP Within last 6 months   Prior Functional Level Assistance with the following:, Mobility   Current Functional Level Assistance with the following:, Mobility   Can patient return to prior living arrangement Yes   Ability to make needs known: Good   Family able to assist with home care needs: Yes   Would you like for me to discuss the discharge plan with any other family members/significant others, and if so, who?  No   Financial Resources Other (Comment), Medicare (Primary: [facility].  Secondary: [facility])   Community Resources None   CM/SW Referral Other (see comment) (N/A)

## 2023-12-29 NOTE — PROGRESS NOTES
Physical Therapy Note:    Attempted to see patient this AM for physical therapy evaluation session. Patient ROCHELLE for surgery. Will follow and re-attempt as schedule permits/patient available.  Thank you,    NIKA LEE, PT     Rehab Caseload Tracker

## 2023-12-29 NOTE — PROGRESS NOTES
Status post right first toe amputation. Patient can remove his postop dressing tomorrow okay to get it wet tomorrow. He can cover the incision with a Band-Aid. Will prescribe him 2 weeks of antibiotics and follow-up in 2 weeks to remove sutures. Patient already has offloading shoe.     Kaleigh Hernadez

## 2023-12-29 NOTE — ANESTHESIA PROCEDURE NOTES
Peripheral Block    Patient location during procedure: pre-op  Reason for block: post-op pain management and at surgeon's request  Start time: 12/29/2023 9:28 AM  End time: 12/29/2023 9:29 AM  Staffing  Performed: anesthesiologist   Anesthesiologist: Darien Samuel MD  Performed by: Darien Samuel MD  Authorized by: Darien Samuel MD    Preanesthetic Checklist  Completed: patient identified, IV checked, site marked, risks and benefits discussed, surgical/procedural consents, equipment checked, pre-op evaluation, timeout performed, anesthesia consent given, oxygen available and monitors applied/VS acknowledged  Peripheral Block   Patient position: supine  Prep: ChloraPrep  Provider prep: mask and sterile gloves  Patient monitoring: cardiac monitor, continuous pulse ox, frequent blood pressure checks, IV access, oxygen and responsive to questions  Block type: Femoral  Adductor canal  Laterality: right  Injection technique: single-shot  Guidance: nerve stimulator and ultrasound guided    Needle   Needle type: insulated echogenic nerve stimulator needle   Needle gauge: 20 G  Needle localization: anatomical landmarks, nerve stimulator and ultrasound guidance  Assessment   Injection assessment: negative aspiration for heme, no paresthesia on injection, local visualized surrounding nerve on ultrasound and no intravascular symptoms  Slow fractionated injection: yes  Hemodynamics: stable  Real-time US image taken/store: yes  Outcomes: patient tolerated procedure well    Additional Notes  Local anesthetic visualized surrounding the nerve with neural structure intact. Permanent image stored on chart.   0.2% Ropivacaine with epi 1:200,000 20 ml + Decadron 4 mg    Medications Administered  ROPivacaine 0.5% with EPINEPHrine 1:[id number] injection (ANESTHESIA USE ONLY) (Mixture components: EPINEPHrine PF 1 MG/ML Soln, 0.005 mL; ROPivacaine 0.5% Soln, 1 mL) - Perineural   18 mL - 12/29/2023 9:28:00 AM

## 2023-12-29 NOTE — PROGRESS NOTES
Occupational Therapy Note:    Attempted to see patient this AM for occupational therapy evaluation session. Patient is currently off the floor for surgery. Will follow and re-attempt as schedule permits/patient available.  Thank you,    Melinda Mendieta

## 2023-12-31 LAB
BACTERIA SPEC CULT: NORMAL
BACTERIA SPEC CULT: NORMAL
SERVICE CMNT-IMP: NORMAL
SERVICE CMNT-IMP: NORMAL

## 2024-01-02 ENCOUNTER — TELEPHONE (OUTPATIENT)
Dept: FAMILY MEDICINE CLINIC | Facility: CLINIC | Age: 74
End: 2024-01-02

## 2024-01-02 LAB
BACTERIA SPEC CULT: NORMAL
SERVICE CMNT-IMP: NORMAL

## 2024-01-03 LAB
BACTERIA SPEC CULT: NORMAL
SERVICE CMNT-IMP: NORMAL

## 2024-01-03 NOTE — TELEPHONE ENCOUNTER
Care Transitions Initial Follow Up Call    Call within 2 business days of discharge: No     Patient: Rohan Velasquez Patient : 1950 MRN: [id number]    [unfilled]    RARS: Readmission Risk Score: 16       Spoke with: Rohan Velasquez    Discharge department/facility: [facility]    Non-face-to-face services provided:  Scheduled appointment with Specialist-Dr. Hernandez , Vascular  on 2024    Follow Up  Future Appointments   Date Time Provider Department Center   2024  9:30 AM Juan Hernandez MD [facility]   2024  9:30 AM Yolanda Murcia, APRN - NP [facility]   2024  2:00 PM Bipin Arroyo Jr., MD [facility]       Nely Mcdonald MA

## 2024-01-12 ENCOUNTER — OFFICE VISIT (OUTPATIENT)
Dept: VASCULAR SURGERY | Age: 74
End: 2024-01-12
Payer: COMMERCIAL

## 2024-01-12 VITALS
SYSTOLIC BLOOD PRESSURE: 159 MMHG | HEIGHT: 68 IN | HEART RATE: 84 BPM | DIASTOLIC BLOOD PRESSURE: 83 MMHG | BODY MASS INDEX: 27.43 KG/M2 | WEIGHT: 181 LBS | OXYGEN SATURATION: 1 %

## 2024-01-12 DIAGNOSIS — I73.9 PVD (PERIPHERAL VASCULAR DISEASE) WITH CLAUDICATION (HCC): Primary | ICD-10-CM

## 2024-01-12 PROCEDURE — 3077F SYST BP >= 140 MM HG: CPT | Performed by: SURGERY

## 2024-01-12 PROCEDURE — 3079F DIAST BP 80-89 MM HG: CPT | Performed by: SURGERY

## 2024-01-12 PROCEDURE — 1111F DSCHRG MED/CURRENT MED MERGE: CPT | Performed by: SURGERY

## 2024-01-12 PROCEDURE — 1123F ACP DISCUSS/DSCN MKR DOCD: CPT | Performed by: SURGERY

## 2024-01-12 PROCEDURE — G8484 FLU IMMUNIZE NO ADMIN: HCPCS | Performed by: SURGERY

## 2024-01-12 PROCEDURE — G8427 DOCREV CUR MEDS BY ELIG CLIN: HCPCS | Performed by: SURGERY

## 2024-01-12 PROCEDURE — 1036F TOBACCO NON-USER: CPT | Performed by: SURGERY

## 2024-01-12 PROCEDURE — 3017F COLORECTAL CA SCREEN DOC REV: CPT | Performed by: SURGERY

## 2024-01-12 PROCEDURE — G8417 CALC BMI ABV UP PARAM F/U: HCPCS | Performed by: SURGERY

## 2024-01-12 PROCEDURE — 99213 OFFICE O/P EST LOW 20 MIN: CPT | Performed by: SURGERY

## 2024-01-12 NOTE — PROGRESS NOTES
[address] [address]  [phone] FAX: [phone]    Rohan Velasquez  : 1950    Chief Complaint:     History of Present Illness:   Patient follows up today for follow-up status post right second toe amputation.    CURRENT MEDICATIONS:  Current Outpatient Medications   Medication Sig Dispense Refill    amoxicillin-clavulanate (AUGMENTIN) 875-125 MG per tablet Take 1 tablet by mouth 2 times daily for 14 days 28 tablet 0    tamsulosin (FLOMAX) 0.4 MG capsule Take 1 capsule by mouth daily 30 capsule 0    amLODIPine-benazepril (LOTREL) 5-20 MG per capsule Take 1 capsule by mouth daily 90 capsule 3     No current facility-administered medications for this visit.       Past Medical History:   Diagnosis Date    Basal cell carcinoma 2014    Erectile dysfunction     Hypertension     Posterior tibial tendon dysfunction (PTTD) of left lower extremity 2022    Pressure injury of left foot, stage 3 (HCC) 2022       Physical Examination:   Height: 1.727 m (5' 8\"), Weight - Scale: 82.1 kg (181 lb), BP: (!) 159/83    Constitutional: he appears well-developed. No distress.   HENT:   Head: Atraumatic.   Eyes: Pupils are equal, round, and reactive to light.   Neck: Normal range of motion.   Cardiovascular: Regular rhythm.    Pulmonary/Chest: Effort normal and breath sounds normal. No respiratory distress.   Abdominal: Soft. Bowel sounds are normal. he exhibits no distension. There is no tenderness. There is no guarding. No hernia.   Musculoskeletal: Normal range of motion.   Neurological: He is alert. CN II- XII grossly intact   Vascular: Doppler pedal pulses surgical site stable    Imaging:          Recommendations/Plans:   Mr. Rohan Velasquez is a 73 y.o. year old male status post right second toe amputation will remove sutures today.  Patient can get incision wet but keep it covered with a Band-Aid will follow-up as needed.    BRISEYDA MICHELLE MD    Elements of this

## 2024-01-15 ENCOUNTER — TELEPHONE (OUTPATIENT)
Dept: UROLOGY | Age: 74
End: 2024-01-15

## 2024-01-15 RX ORDER — TAMSULOSIN HYDROCHLORIDE 0.4 MG/1
0.4 CAPSULE ORAL DAILY
Qty: 30 CAPSULE | Refills: 0 | Status: SHIPPED | OUTPATIENT
Start: 2024-01-15

## 2024-01-23 ENCOUNTER — OFFICE VISIT (OUTPATIENT)
Dept: UROLOGY | Age: 74
End: 2024-01-23
Payer: COMMERCIAL

## 2024-01-23 DIAGNOSIS — R33.8 ACUTE URINARY RETENTION: Primary | ICD-10-CM

## 2024-01-23 DIAGNOSIS — N39.0 URINARY TRACT INFECTION WITHOUT HEMATURIA, SITE UNSPECIFIED: ICD-10-CM

## 2024-01-23 DIAGNOSIS — R39.14 FEELING OF INCOMPLETE BLADDER EMPTYING: ICD-10-CM

## 2024-01-23 LAB
BILIRUBIN, URINE, POC: NEGATIVE
BLOOD URINE, POC: NORMAL
GLUCOSE URINE, POC: NEGATIVE
KETONES, URINE, POC: NEGATIVE
LEUKOCYTE ESTERASE, URINE, POC: NEGATIVE
NITRITE, URINE, POC: NEGATIVE
PH, URINE, POC: 6 (ref 4.6–8)
PROTEIN,URINE, POC: NORMAL
PVR, POC: 39 CC
SPECIFIC GRAVITY, URINE, POC: 1.01 (ref 1–1.03)
URINALYSIS CLARITY, POC: NORMAL
URINALYSIS COLOR, POC: NORMAL
UROBILINOGEN, POC: NORMAL

## 2024-01-23 PROCEDURE — 81003 URINALYSIS AUTO W/O SCOPE: CPT | Performed by: NURSE PRACTITIONER

## 2024-01-23 PROCEDURE — 1123F ACP DISCUSS/DSCN MKR DOCD: CPT | Performed by: NURSE PRACTITIONER

## 2024-01-23 PROCEDURE — G8427 DOCREV CUR MEDS BY ELIG CLIN: HCPCS | Performed by: NURSE PRACTITIONER

## 2024-01-23 PROCEDURE — 99214 OFFICE O/P EST MOD 30 MIN: CPT | Performed by: NURSE PRACTITIONER

## 2024-01-23 PROCEDURE — 51798 US URINE CAPACITY MEASURE: CPT | Performed by: NURSE PRACTITIONER

## 2024-01-23 PROCEDURE — G8417 CALC BMI ABV UP PARAM F/U: HCPCS | Performed by: NURSE PRACTITIONER

## 2024-01-23 PROCEDURE — 1036F TOBACCO NON-USER: CPT | Performed by: NURSE PRACTITIONER

## 2024-01-23 PROCEDURE — 3017F COLORECTAL CA SCREEN DOC REV: CPT | Performed by: NURSE PRACTITIONER

## 2024-01-23 PROCEDURE — 1111F DSCHRG MED/CURRENT MED MERGE: CPT | Performed by: NURSE PRACTITIONER

## 2024-01-23 PROCEDURE — G8484 FLU IMMUNIZE NO ADMIN: HCPCS | Performed by: NURSE PRACTITIONER

## 2024-01-23 RX ORDER — TAMSULOSIN HYDROCHLORIDE 0.4 MG/1
0.4 CAPSULE ORAL EVERY EVENING
Qty: 90 CAPSULE | Refills: 3 | Status: SHIPPED | OUTPATIENT
Start: 2024-01-23

## 2024-01-23 ASSESSMENT — ENCOUNTER SYMPTOMS
BACK PAIN: 0
NAUSEA: 0

## 2024-01-23 NOTE — PROGRESS NOTES
[facility] UROLOGY  [address] [place] [address]  [phone]          Rohan Velasquez  : 1950    Chief Complaint   Patient presents with    Follow-up          HPI     Rohan Velasquez is a 73 y.o. male    3-month follow-up on BPH/urinary retention.  Initially patient was seen in the ER 2023 with inability to void.  Prior to the episode of retention he had been diagnosed with a viral illness.  A catheter had to be placed in the ER due to a liter of urine being retained in the bladder.  He was given Flomax.    There is no prior history of urinary retention.  No history of BPH or LUTS prior to this episode.  His catheter has been removed.  Has been voiding sufficiently.  He was thinking about trying to wean off of Flomax I told him he could try to do so but if any symptoms worsen when doing this he would need to restart.  I have also reviewed his medical records and do not see where PSA blood test was done.  He is scheduled to see PCP next month for annual physical.  I have asked him to request a PSA blood test.      Past Medical History:   Diagnosis Date    Basal cell carcinoma 2014    Erectile dysfunction     Hypertension     Posterior tibial tendon dysfunction (PTTD) of left lower extremity 2022    Pressure injury of left foot, stage 3 (HCC) 2022     Past Surgical History:   Procedure Laterality Date    HEENT      tonsillectomy    MALIGNANT SKIN LESION EXCISION  2014    left ear lesion; Basal cell carcinoma    TOE AMPUTATION Right 2023    RIGHT SECOND TOE AMPUTATION performed by Juan Hernandez MD at [facility] MAIN OR    TOE AMPUTATION Right 2023    RIGHT FIRST TOE AMPUTATION performed by Juan Hernandez MD at [facility] MAIN OR     Current Outpatient Medications   Medication Sig Dispense Refill    tamsulosin (FLOMAX) 0.4 MG capsule Take 1 capsule by mouth every evening 90 capsule 3    tamsulosin (FLOMAX) 0.4 MG capsule Take 1 capsule by mouth daily 30

## 2024-01-30 RX ORDER — AMLODIPINE BESYLATE AND BENAZEPRIL HYDROCHLORIDE 5; 20 MG/1; MG/1
CAPSULE ORAL DAILY
Qty: 90 CAPSULE | Refills: 3 | OUTPATIENT
Start: 2024-01-30

## 2024-02-19 ENCOUNTER — OFFICE VISIT (OUTPATIENT)
Dept: FAMILY MEDICINE CLINIC | Facility: CLINIC | Age: 74
End: 2024-02-19
Payer: COMMERCIAL

## 2024-02-19 VITALS
DIASTOLIC BLOOD PRESSURE: 80 MMHG | OXYGEN SATURATION: 100 % | BODY MASS INDEX: 28.74 KG/M2 | TEMPERATURE: 97.1 F | HEART RATE: 79 BPM | WEIGHT: 189 LBS | SYSTOLIC BLOOD PRESSURE: 130 MMHG

## 2024-02-19 DIAGNOSIS — I10 PRIMARY HYPERTENSION: ICD-10-CM

## 2024-02-19 DIAGNOSIS — I73.9 PVD (PERIPHERAL VASCULAR DISEASE) (HCC): ICD-10-CM

## 2024-02-19 DIAGNOSIS — E11.9 DIET-CONTROLLED DIABETES MELLITUS (HCC): Primary | ICD-10-CM

## 2024-02-19 PROBLEM — E11.628 DIABETIC FOOT INFECTION (HCC): Status: RESOLVED | Noted: 2023-10-04 | Resolved: 2024-02-19

## 2024-02-19 PROBLEM — L89.893 PRESSURE INJURY OF LEFT FOOT, STAGE 3 (HCC): Status: RESOLVED | Noted: 2022-02-09 | Resolved: 2024-02-19

## 2024-02-19 PROBLEM — E11.65 TYPE 2 DIABETES MELLITUS WITH HYPERGLYCEMIA, WITHOUT LONG-TERM CURRENT USE OF INSULIN (HCC): Chronic | Status: RESOLVED | Noted: 2022-02-21 | Resolved: 2024-02-19

## 2024-02-19 PROBLEM — L08.9 DIABETIC FOOT INFECTION (HCC): Status: RESOLVED | Noted: 2023-10-04 | Resolved: 2024-02-19

## 2024-02-19 PROBLEM — M86.9 DIABETIC FOOT ULCER WITH OSTEOMYELITIS (HCC): Status: RESOLVED | Noted: 2023-09-24 | Resolved: 2024-02-19

## 2024-02-19 PROBLEM — L97.509 DIABETIC FOOT ULCER WITH OSTEOMYELITIS (HCC): Status: RESOLVED | Noted: 2023-09-24 | Resolved: 2024-02-19

## 2024-02-19 PROBLEM — E77.8 HYPOPROTEINEMIA (HCC): Status: RESOLVED | Noted: 2023-09-26 | Resolved: 2024-02-19

## 2024-02-19 PROBLEM — E11.621 DIABETIC FOOT ULCER WITH OSTEOMYELITIS (HCC): Status: RESOLVED | Noted: 2023-09-24 | Resolved: 2024-02-19

## 2024-02-19 PROBLEM — E11.69 DIABETIC FOOT ULCER WITH OSTEOMYELITIS (HCC): Status: RESOLVED | Noted: 2023-09-24 | Resolved: 2024-02-19

## 2024-02-19 LAB — HBA1C MFR BLD: 6.6 %

## 2024-02-19 PROCEDURE — G8427 DOCREV CUR MEDS BY ELIG CLIN: HCPCS | Performed by: FAMILY MEDICINE

## 2024-02-19 PROCEDURE — 3017F COLORECTAL CA SCREEN DOC REV: CPT | Performed by: FAMILY MEDICINE

## 2024-02-19 PROCEDURE — 83036 HEMOGLOBIN GLYCOSYLATED A1C: CPT | Performed by: FAMILY MEDICINE

## 2024-02-19 PROCEDURE — 1036F TOBACCO NON-USER: CPT | Performed by: FAMILY MEDICINE

## 2024-02-19 PROCEDURE — G8417 CALC BMI ABV UP PARAM F/U: HCPCS | Performed by: FAMILY MEDICINE

## 2024-02-19 PROCEDURE — 3046F HEMOGLOBIN A1C LEVEL >9.0%: CPT | Performed by: FAMILY MEDICINE

## 2024-02-19 PROCEDURE — 3079F DIAST BP 80-89 MM HG: CPT | Performed by: FAMILY MEDICINE

## 2024-02-19 PROCEDURE — 1123F ACP DISCUSS/DSCN MKR DOCD: CPT | Performed by: FAMILY MEDICINE

## 2024-02-19 PROCEDURE — 2022F DILAT RTA XM EVC RTNOPTHY: CPT | Performed by: FAMILY MEDICINE

## 2024-02-19 PROCEDURE — 99213 OFFICE O/P EST LOW 20 MIN: CPT | Performed by: FAMILY MEDICINE

## 2024-02-19 PROCEDURE — 3075F SYST BP GE 130 - 139MM HG: CPT | Performed by: FAMILY MEDICINE

## 2024-02-19 PROCEDURE — G8484 FLU IMMUNIZE NO ADMIN: HCPCS | Performed by: FAMILY MEDICINE

## 2024-02-19 RX ORDER — AMLODIPINE BESYLATE AND BENAZEPRIL HYDROCHLORIDE 5; 20 MG/1; MG/1
1 CAPSULE ORAL DAILY
Qty: 90 CAPSULE | Refills: 3 | Status: SHIPPED | OUTPATIENT
Start: 2024-02-19

## 2024-02-19 SDOH — ECONOMIC STABILITY: FOOD INSECURITY: WITHIN THE PAST 12 MONTHS, THE FOOD YOU BOUGHT JUST DIDN'T LAST AND YOU DIDN'T HAVE MONEY TO GET MORE.: NEVER TRUE

## 2024-02-19 SDOH — ECONOMIC STABILITY: INCOME INSECURITY: HOW HARD IS IT FOR YOU TO PAY FOR THE VERY BASICS LIKE FOOD, HOUSING, MEDICAL CARE, AND HEATING?: NOT HARD AT ALL

## 2024-02-19 SDOH — ECONOMIC STABILITY: FOOD INSECURITY: WITHIN THE PAST 12 MONTHS, YOU WORRIED THAT YOUR FOOD WOULD RUN OUT BEFORE YOU GOT MONEY TO BUY MORE.: NEVER TRUE

## 2024-02-19 ASSESSMENT — ENCOUNTER SYMPTOMS
RESPIRATORY NEGATIVE: 1
GASTROINTESTINAL NEGATIVE: 1
EYES NEGATIVE: 1

## 2024-02-19 ASSESSMENT — PATIENT HEALTH QUESTIONNAIRE - PHQ9
SUM OF ALL RESPONSES TO PHQ QUESTIONS 1-9: 0
2. FEELING DOWN, DEPRESSED OR HOPELESS: 0
SUM OF ALL RESPONSES TO PHQ9 QUESTIONS 1 & 2: 0
1. LITTLE INTEREST OR PLEASURE IN DOING THINGS: 0
SUM OF ALL RESPONSES TO PHQ QUESTIONS 1-9: 0

## 2024-02-19 NOTE — PROGRESS NOTES
HISTORY OF PRESENT ILLNESS    Rohan Velasquez is a 73 y.o. male.  HPI  Chief Complaint   Patient presents with    Diabetes    Medication Refill     See above. Feeling well.  History of pre-diabetes. Controlled with diet.  History of PVD. Recently had amputation of right 2nd toe. Recovering well.  No side effects from BP medication. No headache or vision change. Denies chest pain or SOB. No swelling.  Problems in foot and toes is related to PVD not DM.      Current Outpatient Medications on File Prior to Visit   Medication Sig Dispense Refill    tamsulosin (FLOMAX) 0.4 MG capsule Take 1 capsule by mouth every evening (Patient not taking: Reported on 2/19/2024) 90 capsule 3     No current facility-administered medications on file prior to visit.     Past Medical History:   Diagnosis Date    Basal cell carcinoma 6/2014    Erectile dysfunction     Hypertension     Posterior tibial tendon dysfunction (PTTD) of left lower extremity 2/9/2022    Pressure injury of left foot, stage 3 (HCC) 2/9/2022       Review of Systems   Constitutional: Negative.    Eyes: Negative.    Respiratory: Negative.     Cardiovascular: Negative.    Gastrointestinal: Negative.    Genitourinary: Negative.    Musculoskeletal: Negative.    Neurological: Negative.       Blood pressure 130/80, pulse 79, temperature 97.1 °F (36.2 °C), weight 85.7 kg (189 lb), SpO2 100 %.    Physical Exam  Vitals and nursing note reviewed.   Constitutional:       Appearance: Normal appearance.   HENT:      Mouth/Throat:      Mouth: Mucous membranes are moist.      Pharynx: Oropharynx is clear.   Eyes:      Conjunctiva/sclera: Conjunctivae normal.   Neck:      Vascular: No carotid bruit.   Cardiovascular:      Rate and Rhythm: Normal rate and regular rhythm.      Heart sounds: Normal heart sounds.   Pulmonary:      Breath sounds: Normal breath sounds.   Musculoskeletal:         General: No swelling.      Cervical back: Neck supple.   Lymphadenopathy:      Cervical: No

## 2024-03-05 ENCOUNTER — TELEPHONE (OUTPATIENT)
Dept: FAMILY MEDICINE CLINIC | Facility: CLINIC | Age: 74
End: 2024-03-05

## 2024-03-05 NOTE — TELEPHONE ENCOUNTER
Bebe from [facility] (nurse care manager) wants to let Dr. Arroyo know that patient is no longer engaging with her and would like you to encourage him to reengage with her. He's not answering the calls. Her phone number is [phone] [facility] [id number].

## 2024-03-05 NOTE — TELEPHONE ENCOUNTER
Called pt and he states that she was calling too often and he did not have anything to update her on. Advised for him to call her and say to maybe only call once a month.     Tried calling Bebe back but automated service wanted to much information.

## 2024-07-12 ENCOUNTER — OFFICE VISIT (OUTPATIENT)
Dept: VASCULAR SURGERY | Age: 74
End: 2024-07-12
Payer: COMMERCIAL

## 2024-07-12 VITALS
HEIGHT: 68 IN | HEART RATE: 81 BPM | OXYGEN SATURATION: 98 % | DIASTOLIC BLOOD PRESSURE: 84 MMHG | SYSTOLIC BLOOD PRESSURE: 167 MMHG | BODY MASS INDEX: 29.4 KG/M2 | WEIGHT: 194 LBS

## 2024-07-12 DIAGNOSIS — I73.9 PVD (PERIPHERAL VASCULAR DISEASE) WITH CLAUDICATION (HCC): Primary | ICD-10-CM

## 2024-07-12 PROCEDURE — G8417 CALC BMI ABV UP PARAM F/U: HCPCS | Performed by: SURGERY

## 2024-07-12 PROCEDURE — G8427 DOCREV CUR MEDS BY ELIG CLIN: HCPCS | Performed by: SURGERY

## 2024-07-12 PROCEDURE — 1036F TOBACCO NON-USER: CPT | Performed by: SURGERY

## 2024-07-12 PROCEDURE — 3077F SYST BP >= 140 MM HG: CPT | Performed by: SURGERY

## 2024-07-12 PROCEDURE — 99213 OFFICE O/P EST LOW 20 MIN: CPT | Performed by: SURGERY

## 2024-07-12 PROCEDURE — 3017F COLORECTAL CA SCREEN DOC REV: CPT | Performed by: SURGERY

## 2024-07-12 PROCEDURE — 1123F ACP DISCUSS/DSCN MKR DOCD: CPT | Performed by: SURGERY

## 2024-07-12 PROCEDURE — 3079F DIAST BP 80-89 MM HG: CPT | Performed by: SURGERY

## 2024-12-05 RX ORDER — AMLODIPINE AND BENAZEPRIL HYDROCHLORIDE 5; 20 MG/1; MG/1
CAPSULE ORAL DAILY
Qty: 90 CAPSULE | Refills: 3 | OUTPATIENT
Start: 2024-12-05

## 2024-12-10 RX ORDER — AMLODIPINE AND BENAZEPRIL HYDROCHLORIDE 5; 20 MG/1; MG/1
CAPSULE ORAL DAILY
Qty: 90 CAPSULE | Refills: 3 | OUTPATIENT
Start: 2024-12-10

## 2025-01-06 RX ORDER — AMLODIPINE AND BENAZEPRIL HYDROCHLORIDE 5; 20 MG/1; MG/1
1 CAPSULE ORAL DAILY
Qty: 90 CAPSULE | Refills: 3 | Status: SHIPPED | OUTPATIENT
Start: 2025-01-06

## 2025-01-06 NOTE — TELEPHONE ENCOUNTER
Refill:  Lotrel #90  Dosage: 5-20 mg  Freq: qd  To: [facility].    Patient has new insurance and is out of medication. He has appointment on 2/17/25

## 2025-02-17 ENCOUNTER — OFFICE VISIT (OUTPATIENT)
Dept: FAMILY MEDICINE CLINIC | Facility: CLINIC | Age: 75
End: 2025-02-17
Payer: COMMERCIAL

## 2025-02-17 VITALS
DIASTOLIC BLOOD PRESSURE: 84 MMHG | WEIGHT: 207 LBS | SYSTOLIC BLOOD PRESSURE: 134 MMHG | HEART RATE: 94 BPM | BODY MASS INDEX: 31.47 KG/M2 | OXYGEN SATURATION: 90 % | TEMPERATURE: 97.1 F

## 2025-02-17 DIAGNOSIS — I10 PRIMARY HYPERTENSION: ICD-10-CM

## 2025-02-17 DIAGNOSIS — E11.9 DIET-CONTROLLED DIABETES MELLITUS (HCC): Primary | ICD-10-CM

## 2025-02-17 LAB — HBA1C MFR BLD: 6.8 %

## 2025-02-17 PROCEDURE — 1123F ACP DISCUSS/DSCN MKR DOCD: CPT | Performed by: FAMILY MEDICINE

## 2025-02-17 PROCEDURE — 3044F HG A1C LEVEL LT 7.0%: CPT | Performed by: FAMILY MEDICINE

## 2025-02-17 PROCEDURE — 1159F MED LIST DOCD IN RCRD: CPT | Performed by: FAMILY MEDICINE

## 2025-02-17 PROCEDURE — 3079F DIAST BP 80-89 MM HG: CPT | Performed by: FAMILY MEDICINE

## 2025-02-17 PROCEDURE — 83036 HEMOGLOBIN GLYCOSYLATED A1C: CPT | Performed by: FAMILY MEDICINE

## 2025-02-17 PROCEDURE — 99213 OFFICE O/P EST LOW 20 MIN: CPT | Performed by: FAMILY MEDICINE

## 2025-02-17 PROCEDURE — 3075F SYST BP GE 130 - 139MM HG: CPT | Performed by: FAMILY MEDICINE

## 2025-02-17 RX ORDER — AMLODIPINE AND BENAZEPRIL HYDROCHLORIDE 5; 20 MG/1; MG/1
1 CAPSULE ORAL DAILY
Qty: 90 CAPSULE | Refills: 3 | Status: SHIPPED | OUTPATIENT
Start: 2025-02-17

## 2025-02-17 SDOH — ECONOMIC STABILITY: FOOD INSECURITY: WITHIN THE PAST 12 MONTHS, YOU WORRIED THAT YOUR FOOD WOULD RUN OUT BEFORE YOU GOT MONEY TO BUY MORE.: NEVER TRUE

## 2025-02-17 SDOH — ECONOMIC STABILITY: FOOD INSECURITY: WITHIN THE PAST 12 MONTHS, THE FOOD YOU BOUGHT JUST DIDN'T LAST AND YOU DIDN'T HAVE MONEY TO GET MORE.: NEVER TRUE

## 2025-02-17 ASSESSMENT — PATIENT HEALTH QUESTIONNAIRE - PHQ9
SUM OF ALL RESPONSES TO PHQ QUESTIONS 1-9: 0
SUM OF ALL RESPONSES TO PHQ QUESTIONS 1-9: 0
1. LITTLE INTEREST OR PLEASURE IN DOING THINGS: NOT AT ALL
SUM OF ALL RESPONSES TO PHQ9 QUESTIONS 1 & 2: 0
2. FEELING DOWN, DEPRESSED OR HOPELESS: NOT AT ALL
SUM OF ALL RESPONSES TO PHQ QUESTIONS 1-9: 0
SUM OF ALL RESPONSES TO PHQ QUESTIONS 1-9: 0

## 2025-02-17 ASSESSMENT — ENCOUNTER SYMPTOMS
RESPIRATORY NEGATIVE: 1
EYES NEGATIVE: 1
GASTROINTESTINAL NEGATIVE: 1

## 2025-02-17 NOTE — PROGRESS NOTES
HISTORY OF PRESENT ILLNESS    Rohan Velasquez is a 74 y.o. male.  HPI  Chief Complaint   Patient presents with    Medication Refill    A1C check     See above. Feeling well.  History of diet controlled DM.  No side effects from BP medication. No headache or vision change. Denies chest pain or SOB. No swelling.      No current outpatient medications on file prior to visit.     No current facility-administered medications on file prior to visit.     Past Medical History:   Diagnosis Date    Basal cell carcinoma 6/2014    Erectile dysfunction     Hypertension     Posterior tibial tendon dysfunction (PTTD) of left lower extremity 2/9/2022    Pressure injury of left foot, stage 3 (HCC) 2/9/2022       Review of Systems   Constitutional: Negative.    Eyes: Negative.    Respiratory: Negative.     Cardiovascular: Negative.    Gastrointestinal: Negative.    Genitourinary: Negative.    Musculoskeletal: Negative.    Neurological: Negative.    Psychiatric/Behavioral: Negative.        Blood pressure 134/84, pulse 94, temperature 97.1 °F (36.2 °C), weight 93.9 kg (207 lb), SpO2 90%.    Physical Exam  Vitals and nursing note reviewed.   Constitutional:       Appearance: Normal appearance.   HENT:      Mouth/Throat:      Mouth: Mucous membranes are moist.      Pharynx: Oropharynx is clear.   Eyes:      Conjunctiva/sclera: Conjunctivae normal.   Neck:      Vascular: No carotid bruit.   Cardiovascular:      Rate and Rhythm: Normal rate and regular rhythm.      Heart sounds: Normal heart sounds.   Pulmonary:      Breath sounds: Normal breath sounds.   Musculoskeletal:         General: No swelling or tenderness. Normal range of motion.      Cervical back: Neck supple.   Lymphadenopathy:      Cervical: No cervical adenopathy.   Neurological:      Deep Tendon Reflexes: Reflexes normal.      Comments: Visual inspection:  Deformity/amputation: absent  Skin lesions/pre-ulcerative calluses: absent  Edema: right- negative, left-

## (undated) DEVICE — BNDG,ELSTC,MATRIX,STRL,4"X5YD,LF,HOOK&LP: Brand: MEDLINE

## (undated) DEVICE — BANDAGE COMPR ELASTIC 4 IN STRL ACE

## (undated) DEVICE — BANDAGE,GAUZE,BULKEE II,4.5"X4.1YD,STRL: Brand: MEDLINE

## (undated) DEVICE — SPONGE GZ W4XL4IN COT 12 PLY TYP VII WVN C FLD DSGN STERILE

## (undated) DEVICE — SUTURE VCRL SZ 3-0 L27IN ABSRB UD L26MM SH 1/2 CIR J416H

## (undated) DEVICE — VASCULAR AMP, I&D: Brand: MEDLINE INDUSTRIES, INC.

## (undated) DEVICE — BANDAGE ROLL,100% COTTON, 6 PLY, LARGE: Brand: KERLIX

## (undated) DEVICE — SOLUTION IRRIG 1000ML 0.9% SOD CHL USP POUR PLAS BTL

## (undated) DEVICE — SUTURE ETHLN SZ 3-0 L18IN NONABSORBABLE BLK PS-2 L19MM 3/8 1669H

## (undated) DEVICE — DRESSING,GAUZE,XEROFORM,CURAD,1"X8",ST: Brand: CURAD